# Patient Record
Sex: MALE | Race: WHITE | Employment: UNEMPLOYED | URBAN - METROPOLITAN AREA
[De-identification: names, ages, dates, MRNs, and addresses within clinical notes are randomized per-mention and may not be internally consistent; named-entity substitution may affect disease eponyms.]

---

## 2017-07-01 ENCOUNTER — APPOINTMENT (OUTPATIENT)
Dept: GENERAL RADIOLOGY | Age: 62
End: 2017-07-01
Attending: EMERGENCY MEDICINE
Payer: MEDICARE

## 2017-07-01 ENCOUNTER — HOSPITAL ENCOUNTER (EMERGENCY)
Age: 62
Discharge: HOME | End: 2017-07-01
Attending: EMERGENCY MEDICINE
Payer: MEDICARE

## 2017-07-01 VITALS
WEIGHT: 191 LBS | RESPIRATION RATE: 26 BRPM | SYSTOLIC BLOOD PRESSURE: 128 MMHG | HEIGHT: 73 IN | TEMPERATURE: 98.2 F | DIASTOLIC BLOOD PRESSURE: 81 MMHG | HEART RATE: 65 BPM | OXYGEN SATURATION: 98 % | BODY MASS INDEX: 25.31 KG/M2

## 2017-07-01 DIAGNOSIS — R06.02 SOB (SHORTNESS OF BREATH): Primary | ICD-10-CM

## 2017-07-01 LAB
ALBUMIN SERPL BCP-MCNC: 3.1 G/DL (ref 3.4–5)
ALBUMIN/GLOB SERPL: 0.9 {RATIO} (ref 0.8–1.7)
ALP SERPL-CCNC: 95 U/L (ref 45–117)
ALT SERPL-CCNC: 14 U/L (ref 16–61)
ANION GAP BLD CALC-SCNC: 8 MMOL/L (ref 3–18)
AST SERPL W P-5'-P-CCNC: 12 U/L (ref 15–37)
ATRIAL RATE: 85 BPM
BASOPHILS # BLD AUTO: 0 K/UL (ref 0–0.1)
BASOPHILS # BLD: 0 % (ref 0–2)
BILIRUB SERPL-MCNC: 0.2 MG/DL (ref 0.2–1)
BNP SERPL-MCNC: 211 PG/ML (ref 0–900)
BUN SERPL-MCNC: 8 MG/DL (ref 7–18)
BUN/CREAT SERPL: 8 (ref 12–20)
CALCIUM SERPL-MCNC: 8.8 MG/DL (ref 8.5–10.1)
CALCULATED P AXIS, ECG09: 34 DEGREES
CALCULATED R AXIS, ECG10: 26 DEGREES
CALCULATED T AXIS, ECG11: 61 DEGREES
CHLORIDE SERPL-SCNC: 108 MMOL/L (ref 100–108)
CK MB CFR SERPL CALC: 0.9 % (ref 0–4)
CK MB CFR SERPL CALC: NORMAL % (ref 0–4)
CK MB SERPL-MCNC: 1.1 NG/ML (ref 5–25)
CK MB SERPL-MCNC: <1 NG/ML (ref 5–25)
CK SERPL-CCNC: 107 U/L (ref 39–308)
CK SERPL-CCNC: 124 U/L (ref 39–308)
CO2 SERPL-SCNC: 25 MMOL/L (ref 21–32)
CREAT SERPL-MCNC: 0.96 MG/DL (ref 0.6–1.3)
DIAGNOSIS, 93000: NORMAL
DIFFERENTIAL METHOD BLD: ABNORMAL
EOSINOPHIL # BLD: 0.2 K/UL (ref 0–0.4)
EOSINOPHIL NFR BLD: 2 % (ref 0–5)
ERYTHROCYTE [DISTWIDTH] IN BLOOD BY AUTOMATED COUNT: 14.6 % (ref 11.6–14.5)
GLOBULIN SER CALC-MCNC: 3.5 G/DL (ref 2–4)
GLUCOSE SERPL-MCNC: 129 MG/DL (ref 74–99)
HCT VFR BLD AUTO: 41.2 % (ref 36–48)
HGB BLD-MCNC: 13.8 G/DL (ref 13–16)
LYMPHOCYTES # BLD AUTO: 33 % (ref 21–52)
LYMPHOCYTES # BLD: 2.2 K/UL (ref 0.9–3.6)
MAGNESIUM SERPL-MCNC: 2.2 MG/DL (ref 1.6–2.6)
MCH RBC QN AUTO: 28.3 PG (ref 24–34)
MCHC RBC AUTO-ENTMCNC: 33.5 G/DL (ref 31–37)
MCV RBC AUTO: 84.4 FL (ref 74–97)
MONOCYTES # BLD: 0.4 K/UL (ref 0.05–1.2)
MONOCYTES NFR BLD AUTO: 6 % (ref 3–10)
NEUTS SEG # BLD: 3.9 K/UL (ref 1.8–8)
NEUTS SEG NFR BLD AUTO: 59 % (ref 40–73)
P-R INTERVAL, ECG05: 154 MS
PLATELET # BLD AUTO: 165 K/UL (ref 135–420)
PMV BLD AUTO: 10 FL (ref 9.2–11.8)
POTASSIUM SERPL-SCNC: 3.6 MMOL/L (ref 3.5–5.5)
PROT SERPL-MCNC: 6.6 G/DL (ref 6.4–8.2)
Q-T INTERVAL, ECG07: 372 MS
QRS DURATION, ECG06: 84 MS
QTC CALCULATION (BEZET), ECG08: 442 MS
RBC # BLD AUTO: 4.88 M/UL (ref 4.7–5.5)
SODIUM SERPL-SCNC: 141 MMOL/L (ref 136–145)
TROPONIN I SERPL-MCNC: <0.02 NG/ML (ref 0–0.04)
TROPONIN I SERPL-MCNC: <0.02 NG/ML (ref 0–0.04)
VENTRICULAR RATE, ECG03: 85 BPM
WBC # BLD AUTO: 6.7 K/UL (ref 4.6–13.2)

## 2017-07-01 PROCEDURE — 83735 ASSAY OF MAGNESIUM: CPT | Performed by: EMERGENCY MEDICINE

## 2017-07-01 PROCEDURE — 71010 XR CHEST PORT: CPT

## 2017-07-01 PROCEDURE — 83880 ASSAY OF NATRIURETIC PEPTIDE: CPT | Performed by: EMERGENCY MEDICINE

## 2017-07-01 PROCEDURE — 80053 COMPREHEN METABOLIC PANEL: CPT | Performed by: EMERGENCY MEDICINE

## 2017-07-01 PROCEDURE — 93005 ELECTROCARDIOGRAM TRACING: CPT

## 2017-07-01 PROCEDURE — 82550 ASSAY OF CK (CPK): CPT | Performed by: EMERGENCY MEDICINE

## 2017-07-01 PROCEDURE — 99285 EMERGENCY DEPT VISIT HI MDM: CPT

## 2017-07-01 PROCEDURE — 85025 COMPLETE CBC W/AUTO DIFF WBC: CPT | Performed by: EMERGENCY MEDICINE

## 2017-07-01 PROCEDURE — 70360 X-RAY EXAM OF NECK: CPT

## 2017-07-01 NOTE — DISCHARGE INSTRUCTIONS

## 2017-07-01 NOTE — ED PROVIDER NOTES
HPI Comments: 1:55 PM Dave Link is a 60 y.o. male with a history of HTN,COPD, heart failure who presents to the emergency department via EMS c/o SOB. The patient reports no other symptoms and has no other concerns at this time. PCP: Cara Mccormack MD      The history is provided by the patient. Past Medical History:   Diagnosis Date    Arthritis     Cancer of bone ([address]     COPD     Heart failure (HCC)     stents x2 in 2008    Hypertension        Past Surgical History:   Procedure Laterality Date    CARDIAC SURG PROCEDURE UNLIST      COLONOSCOPY N/A 8/15/2016    COLONOSCOPY with polypectomy, biopsy and clip performed by Viviana Sullivan MD at [address]  6-5-13    HX HEART CATHETERIZATION  6-5-13         Family History:   Problem Relation Age of Onset    Stroke Mother     Heart Disease Father        Social History     Social History    Marital status: SINGLE     Spouse name: N/A    Number of children: N/A    Years of education: N/A     Occupational History    Not on file. Social History Main Topics    Smoking status: Former Smoker     Packs/day: 2.00     Years: 39.00     Quit date: 8/10/2012    Smokeless tobacco: Never Used    Alcohol use No    Drug use: No    Sexual activity: No     Other Topics Concern    Not on file     Social History Narrative         ALLERGIES: Review of patient's allergies indicates no known allergies. Review of Systems   Respiratory: Positive for shortness of breath. There were no vitals filed for this visit. Physical Exam   Constitutional: He is oriented to person, place, and time. He appears well-developed. HENT:   Head: Normocephalic and atraumatic. Eyes: EOM are normal. Pupils are equal, round, and reactive to light. Neck: Normal range of motion. Neck supple. Cardiovascular: Normal rate, regular rhythm and normal heart sounds. Exam reveals no friction rub.     No murmur heard.  Pulmonary/Chest: Effort normal and breath sounds normal. No respiratory distress. He has no wheezes. Abdominal: Soft. He exhibits no distension. There is no tenderness. There is no rebound and no guarding. Musculoskeletal: Normal range of motion. Neurological: He is alert and oriented to person, place, and time. Skin: Skin is warm and dry. Psychiatric: He has a normal mood and affect. His behavior is normal. Thought content normal.        MDM  Number of Diagnoses or Management Options  Diagnosis management comments:  57-year-old male presents for shortness of breath. Initially he said he was just choking however he said he felt like he was choking because he was having trouble getting air. EMS arrived on scene hew was noted to  have diminished lung sounds bilaterally given a nebulizer his symptoms improved significantly. On arrival he had no symptoms. Chest x-ray and soft tissue neck were unremarkable. EKG shows sinus 85 and normal axis normal intervals no ST elevation or depression or hypertrophy. Patient is feeling better likely acute COPD exacerbation. We will double check troponin and if normal send home. Additional history patient woke up felt short of breath and vomited once,  never had chest pain and this resolved shortly after. Troponin is negative ×2 do not think this is angina. We will refer back to primary care doctor. Patient feels better without intervention aside from albuterol by EMS    ED Course       Procedures                       Scribe Attestation:     Alex Santos, scribing for and in the presence of Hesham Wright MD July 01, 2017 at 1:56 PM     Signed by: Yodit Canada, July 01, 2017, 1:56 PM    Physician Attestation:   I personally performed the services described in this documentation, reviewed and edited the documentation which was dictated to the scribe in my presence, and it accurately records my words and actions.  Sherron Low Chriss Stafford MD  July 01, 2017 at 1:56 PM

## 2017-07-18 ENCOUNTER — HOSPITAL ENCOUNTER (OUTPATIENT)
Dept: CT IMAGING | Age: 62
Discharge: HOME | End: 2017-07-18
Attending: INTERNAL MEDICINE
Payer: MEDICARE

## 2017-07-18 DIAGNOSIS — C83.38 DIFFUSE LARGE B-CELL LYMPHOMA OF LYMPH NODES OF MULTIPLE SITES (HCC): ICD-10-CM

## 2017-07-18 PROCEDURE — 74011636320 HC RX REV CODE- 636/320: Performed by: INTERNAL MEDICINE

## 2017-07-18 PROCEDURE — 74177 CT ABD & PELVIS W/CONTRAST: CPT

## 2017-07-18 RX ADMIN — IOPAMIDOL 100 ML: 612 INJECTION, SOLUTION INTRAVENOUS at 08:00

## 2017-08-29 ENCOUNTER — HOSPITAL ENCOUNTER (EMERGENCY)
Age: 62
Discharge: HOME | End: 2017-08-29
Attending: EMERGENCY MEDICINE
Payer: MEDICARE

## 2017-08-29 ENCOUNTER — APPOINTMENT (OUTPATIENT)
Dept: CT IMAGING | Age: 62
End: 2017-08-29
Attending: EMERGENCY MEDICINE
Payer: MEDICARE

## 2017-08-29 VITALS
SYSTOLIC BLOOD PRESSURE: 131 MMHG | TEMPERATURE: 97.8 F | HEIGHT: 72 IN | OXYGEN SATURATION: 92 % | DIASTOLIC BLOOD PRESSURE: 76 MMHG | WEIGHT: 192 LBS | HEART RATE: 79 BPM | RESPIRATION RATE: 20 BRPM | BODY MASS INDEX: 26.01 KG/M2

## 2017-08-29 DIAGNOSIS — M54.40 LOW BACK PAIN WITH SCIATICA, SCIATICA LATERALITY UNSPECIFIED, UNSPECIFIED BACK PAIN LATERALITY, UNSPECIFIED CHRONICITY: Primary | ICD-10-CM

## 2017-08-29 LAB
ALBUMIN SERPL-MCNC: 3.3 G/DL (ref 3.4–5)
ALBUMIN/GLOB SERPL: 1.1 {RATIO} (ref 0.8–1.7)
ALP SERPL-CCNC: 102 U/L (ref 45–117)
ALT SERPL-CCNC: 15 U/L (ref 16–61)
ANION GAP SERPL CALC-SCNC: 7 MMOL/L (ref 3–18)
APPEARANCE UR: CLEAR
AST SERPL-CCNC: 17 U/L (ref 15–37)
BASOPHILS # BLD: 0.1 K/UL (ref 0–0.06)
BASOPHILS NFR BLD: 1 % (ref 0–2)
BILIRUB SERPL-MCNC: 0.3 MG/DL (ref 0.2–1)
BILIRUB UR QL: NEGATIVE
BUN SERPL-MCNC: 9 MG/DL (ref 7–18)
BUN/CREAT SERPL: 9 (ref 12–20)
CALCIUM SERPL-MCNC: 9 MG/DL (ref 8.5–10.1)
CHLORIDE SERPL-SCNC: 106 MMOL/L (ref 100–108)
CO2 SERPL-SCNC: 28 MMOL/L (ref 21–32)
COLOR UR: YELLOW
CREAT SERPL-MCNC: 0.96 MG/DL (ref 0.6–1.3)
DIFFERENTIAL METHOD BLD: ABNORMAL
EOSINOPHIL # BLD: 0.2 K/UL (ref 0–0.4)
EOSINOPHIL NFR BLD: 4 % (ref 0–5)
EPITH CASTS URNS QL MICRO: ABNORMAL /LPF (ref 0–5)
ERYTHROCYTE [DISTWIDTH] IN BLOOD BY AUTOMATED COUNT: 14.4 % (ref 11.6–14.5)
GLOBULIN SER CALC-MCNC: 3 G/DL (ref 2–4)
GLUCOSE SERPL-MCNC: 92 MG/DL (ref 74–99)
GLUCOSE UR STRIP.AUTO-MCNC: NEGATIVE MG/DL
HCT VFR BLD AUTO: 41.4 % (ref 36–48)
HGB BLD-MCNC: 13.1 G/DL (ref 13–16)
HGB UR QL STRIP: NEGATIVE
HYALINE CASTS URNS QL MICRO: ABNORMAL /LPF (ref 0–2)
KETONES UR QL STRIP.AUTO: NEGATIVE MG/DL
LEUKOCYTE ESTERASE UR QL STRIP.AUTO: ABNORMAL
LYMPHOCYTES # BLD: 1.2 K/UL (ref 0.9–3.6)
LYMPHOCYTES NFR BLD: 24 % (ref 21–52)
MCH RBC QN AUTO: 27.1 PG (ref 24–34)
MCHC RBC AUTO-ENTMCNC: 31.6 G/DL (ref 31–37)
MCV RBC AUTO: 85.7 FL (ref 74–97)
MONOCYTES # BLD: 0.4 K/UL (ref 0.05–1.2)
MONOCYTES NFR BLD: 8 % (ref 3–10)
MUCOUS THREADS URNS QL MICRO: ABNORMAL /LPF
NEUTS SEG # BLD: 3.2 K/UL (ref 1.8–8)
NEUTS SEG NFR BLD: 63 % (ref 40–73)
NITRITE UR QL STRIP.AUTO: NEGATIVE
PH UR STRIP: 6 [PH] (ref 5–8)
PLATELET # BLD AUTO: 185 K/UL (ref 135–420)
PMV BLD AUTO: 10.1 FL (ref 9.2–11.8)
POTASSIUM SERPL-SCNC: 4.6 MMOL/L (ref 3.5–5.5)
PROT SERPL-MCNC: 6.3 G/DL (ref 6.4–8.2)
PROT UR STRIP-MCNC: NEGATIVE MG/DL
RBC # BLD AUTO: 4.83 M/UL (ref 4.7–5.5)
RBC #/AREA URNS HPF: ABNORMAL /HPF (ref 0–5)
SODIUM SERPL-SCNC: 141 MMOL/L (ref 136–145)
SP GR UR REFRACTOMETRY: >1.03 (ref 1–1.03)
UROBILINOGEN UR QL STRIP.AUTO: 1 EU/DL (ref 0.2–1)
WBC # BLD AUTO: 4.9 K/UL (ref 4.6–13.2)
WBC URNS QL MICRO: ABNORMAL /HPF (ref 0–4)

## 2017-08-29 PROCEDURE — 74011250636 HC RX REV CODE- 250/636: Performed by: EMERGENCY MEDICINE

## 2017-08-29 PROCEDURE — 81001 URINALYSIS AUTO W/SCOPE: CPT | Performed by: EMERGENCY MEDICINE

## 2017-08-29 PROCEDURE — 80053 COMPREHEN METABOLIC PANEL: CPT | Performed by: EMERGENCY MEDICINE

## 2017-08-29 PROCEDURE — 74177 CT ABD & PELVIS W/CONTRAST: CPT

## 2017-08-29 PROCEDURE — 96375 TX/PRO/DX INJ NEW DRUG ADDON: CPT

## 2017-08-29 PROCEDURE — 85025 COMPLETE CBC W/AUTO DIFF WBC: CPT | Performed by: EMERGENCY MEDICINE

## 2017-08-29 PROCEDURE — 96374 THER/PROPH/DIAG INJ IV PUSH: CPT

## 2017-08-29 PROCEDURE — 99283 EMERGENCY DEPT VISIT LOW MDM: CPT

## 2017-08-29 PROCEDURE — 74011636320 HC RX REV CODE- 636/320: Performed by: EMERGENCY MEDICINE

## 2017-08-29 RX ORDER — IBUPROFEN 600 MG/1
600 TABLET ORAL
Qty: 30 TAB | Refills: 0 | Status: ON HOLD | OUTPATIENT
Start: 2017-08-29 | End: 2018-06-28

## 2017-08-29 RX ORDER — OXYCODONE AND ACETAMINOPHEN 5; 325 MG/1; MG/1
TABLET ORAL
Qty: 20 TAB | Refills: 0 | Status: SHIPPED | OUTPATIENT
Start: 2017-08-29 | End: 2017-12-10

## 2017-08-29 RX ORDER — HYDROMORPHONE HYDROCHLORIDE 1 MG/ML
1 INJECTION, SOLUTION INTRAMUSCULAR; INTRAVENOUS; SUBCUTANEOUS ONCE
Status: COMPLETED | OUTPATIENT
Start: 2017-08-29 | End: 2017-08-29

## 2017-08-29 RX ORDER — ONDANSETRON 2 MG/ML
4 INJECTION INTRAMUSCULAR; INTRAVENOUS
Status: COMPLETED | OUTPATIENT
Start: 2017-08-29 | End: 2017-08-29

## 2017-08-29 RX ADMIN — ONDANSETRON 4 MG: 2 INJECTION INTRAMUSCULAR; INTRAVENOUS at 09:34

## 2017-08-29 RX ADMIN — IOPAMIDOL 100 ML: 612 INJECTION, SOLUTION INTRAVENOUS at 09:09

## 2017-08-29 RX ADMIN — HYDROMORPHONE HYDROCHLORIDE 1 MG: 1 INJECTION, SOLUTION INTRAMUSCULAR; INTRAVENOUS; SUBCUTANEOUS at 09:35

## 2017-08-29 NOTE — DISCHARGE INSTRUCTIONS
Back Pain: Care Instructions  Your Care Instructions    Back pain has many possible causes. It is often related to problems with muscles and ligaments of the back. It may also be related to problems with the nerves, discs, or bones of the back. Moving, lifting, standing, sitting, or sleeping in an awkward way can strain the back. Sometimes you don't notice the injury until later. Arthritis is another common cause of back pain. Although it may hurt a lot, back pain usually improves on its own within several weeks. Most people recover in 12 weeks or less. Using good home treatment and being careful not to stress your back can help you feel better sooner. Follow-up care is a key part of your treatment and safety. Be sure to make and go to all appointments, and call your doctor if you are having problems. Its also a good idea to know your test results and keep a list of the medicines you take. How can you care for yourself at home? · Sit or lie in positions that are most comfortable and reduce your pain. Try one of these positions when you lie down:  ¨ Lie on your back with your knees bent and supported by large pillows. ¨ Lie on the floor with your legs on the seat of a sofa or chair. [place] on your side with your knees and hips bent and a pillow between your legs. ¨ Lie on your stomach if it does not make pain worse. · Do not sit up in bed, and avoid soft couches and twisted positions. Bed rest can help relieve pain at first, but it delays healing. Avoid bed rest after the first day of back pain. · Change positions every 30 minutes. If you must sit for long periods of time, take breaks from sitting. Get up and walk around, or lie in a comfortable position. · Try using a heating pad on a low or medium setting for 15 to 20 minutes every 2 or 3 hours. Try a warm shower in place of one session with the heating pad. · You can also try an ice pack for 10 to 15 minutes every 2 to 3 hours.  Put a thin cloth between the ice pack and your skin. · Take pain medicines exactly as directed. ¨ If the doctor gave you a prescription medicine for pain, take it as prescribed. ¨ If you are not taking a prescription pain medicine, ask your doctor if you can take an over-the-counter medicine. · Take short walks several times a day. You can start with 5 to 10 minutes, 3 or 4 times a day, and work up to longer walks. Walk on level surfaces and avoid hills and stairs until your back is better. · Return to work and other activities as soon as you can. Continued rest without activity is usually not good for your back. · To prevent future back pain, do exercises to stretch and strengthen your back and stomach. Learn how to use good posture, safe lifting techniques, and proper body mechanics. When should you call for help? Call your doctor now or seek immediate medical care if:  · You have new or worsening numbness in your legs. · You have new or worsening weakness in your legs. (This could make it hard to stand up.)  · You lose control of your bladder or bowels. Watch closely for changes in your health, and be sure to contact your doctor if:  · Your pain gets worse. · You are not getting better after 2 weeks. Where can you learn more? Go to http://messi-thang.info/. Enter S192 in the search box to learn more about \"Back Pain: Care Instructions. \"  Current as of: March 21, 2017  Content Version: 11.3  © 8190-8087 [employer]. Care instructions adapted under license by [employer] (which disclaims liability or warranty for this information). If you have questions about a medical condition or this instruction, always ask your healthcare professional. [address] any warranty or liability for your use of this information. Learning About Relief for Back Pain  What is back tension and strain?     Back strain happens when you overstretch, or pull, a muscle in your back. You may hurt your back in an accident or when you exercise or lift something. Most back pain will get better with rest and time. You can take care of yourself at home to help your back heal.  What can you do first to relieve back pain? When you first feel back pain, try these steps:  · Walk. Take a short walk (10 to 20 minutes) on a level surface (no slopes, hills, or stairs) every 2 to 3 hours. Walk only distances you can manage without pain, especially leg pain. · Relax. Find a comfortable position for rest. Some people are comfortable on the floor or a medium-firm bed with a small pillow under their head and another under their knees. Some people prefer to lie on their side with a pillow between their knees. Don't stay in one position for too long. · Try heat or ice. Try using a heating pad on a low or medium setting, or take a warm shower, for 15 to 20 minutes every 2 to 3 hours. Or you can buy single-use heat wraps that last up to 8 hours. You can also try an ice pack for 10 to 15 minutes every 2 to 3 hours. You can use an ice pack or a bag of frozen vegetables wrapped in a thin towel. There is not strong evidence that either heat or ice will help, but you can try them to see if they help. You may also want to try switching between heat and cold. · Take pain medicine exactly as directed. ¨ If the doctor gave you a prescription medicine for pain, take it as prescribed. ¨ If you are not taking a prescription pain medicine, ask your doctor if you can take an over-the-counter medicine. What else can you do? · Stretch and exercise. Exercises that increase flexibility may relieve your pain and make it easier for your muscles to keep your spine in a good, neutral position. And don't forget to keep walking. · Do self-massage. You can use self-massage to unwind after work or school or to energize yourself in the morning. You can easily massage your feet, hands, or neck.  Self-massage works best if you are in comfortable clothes and are sitting or lying in a comfortable position. Use oil or lotion to massage bare skin. · Reduce stress. Back pain can lead to a vicious [place]ne[place]: Distress about the pain tenses the muscles in your back, which in turn causes more pain. Learn how to relax your mind and your muscles to lower your stress. Where can you learn more? Go to http://messi-thang.info/. Enter P242 in the search box to learn more about \"Learning About Relief for Back Pain. \"  Current as of: March 21, 2017  Content Version: 11.3  © 5923-5058 [employer]. Care instructions adapted under license by [employer] (which disclaims liability or warranty for this information). If you have questions about a medical condition or this instruction, always ask your healthcare professional. [address] any warranty or liability for your use of this information.

## 2017-08-29 NOTE — ED TRIAGE NOTES
C/o right lower back pain x 2 days. Denies any injury or heavy lifting. Denies urinary sx's. States he has Non-Hodgkin's lymphoma, in remission. Denies fevers. States he has taken aspirin & used a heating pad without any relief from pain.

## 2017-08-29 NOTE — ED NOTES
Hourly rounding:  Patient returned from CT. States he is still unable to provide urine specimen. Pain level now 5/10. Call bell within reach.

## 2017-08-29 NOTE — ED PROVIDER NOTES
HPI Comments: 8:58 AM Dave Muniz is a 60 y.o. male with a history of COPD and lymphoma presents to ED c/o lower back pain onset today. Pain level is 8/10 and pt states it started unexpectedly. Pt had previous back pain in 2013 when he was diagnosed with lymphoma. The lymphoma is in remission. Denies any other symptoms or complaints at the moment. Followed by: Dr. Glen Barton      The history is provided by the patient. Past Medical History:   Diagnosis Date    Arthritis     Cancer of bone ([address]     COPD     Heart failure (HCC)     stents x2 in 2008    Hypertension        Past Surgical History:   Procedure Laterality Date    CARDIAC SURG PROCEDURE UNLIST      COLONOSCOPY N/A 8/15/2016    COLONOSCOPY with polypectomy, biopsy and clip performed by Krishna Bryan MD at [address]  6-5-13     HEART CATHETERIZATION  6-5-13         Family History:   Problem Relation Age of Onset    Stroke Mother     Heart Disease Father        Social History     Social History    Marital status: SINGLE     Spouse name: N/A    Number of children: N/A    Years of education: N/A     Occupational History    Not on file. Social History Main Topics    Smoking status: Former Smoker     Packs/day: 2.00     Years: 39.00     Quit date: 8/10/2012    Smokeless tobacco: Never Used    Alcohol use No    Drug use: No    Sexual activity: No     Other Topics Concern    Not on file     Social History Narrative         ALLERGIES: Review of patient's allergies indicates no known allergies. Review of Systems   Constitutional: Negative. HENT: Negative. Eyes: Negative. Respiratory: Negative. Cardiovascular: Negative. Gastrointestinal: Negative. Endocrine: Negative. Genitourinary: Negative. Musculoskeletal: Positive for back pain. Skin: Negative. Allergic/Immunologic: Negative. Neurological: Negative. Hematological: Negative. Psychiatric/Behavioral: Negative. All other systems reviewed and are negative. Vitals:    08/29/17 0844 08/29/17 1045   BP: 140/87 131/76   Pulse: 79    Resp: 20    Temp: 97.8 °F (36.6 °C)    SpO2: 95% 92%   Weight: 87.1 kg (192 lb)    Height: 6' (1.829 m)             Physical Exam   Constitutional: He is oriented to person, place, and time. He appears well-developed and well-nourished. No distress. HENT:   Head: Normocephalic. Mouth/Throat: Oropharynx is clear and moist.   Eyes: Conjunctivae and EOM are normal. Pupils are equal, round, and reactive to light. Neck: Normal range of motion. Neck supple. Cardiovascular: Normal rate, regular rhythm, normal heart sounds and intact distal pulses. No murmur heard. Pulmonary/Chest: Effort normal and breath sounds normal. No respiratory distress. He has no wheezes. He has no rales. He exhibits no tenderness. Abdominal: Soft. Bowel sounds are normal. He exhibits no distension. There is no tenderness. There is no rebound. Musculoskeletal: Normal range of motion. He exhibits no edema or tenderness. Neurological: He is alert and oriented to person, place, and time. No cranial nerve deficit. He exhibits normal muscle tone. Coordination normal.   Skin: Skin is warm and dry. No rash noted. Psychiatric: He has a normal mood and affect. His behavior is normal. Judgment and thought content normal.   Nursing note and vitals reviewed.        MDM  Number of Diagnoses or Management Options  Low back pain with sciatica, sciatica laterality unspecified, unspecified back pain laterality, unspecified chronicity: new and requires workup     Amount and/or Complexity of Data Reviewed  Clinical lab tests: ordered and reviewed  Tests in the radiology section of CPT®: ordered and reviewed    Risk of Complications, Morbidity, and/or Mortality  Presenting problems: moderate  Diagnostic procedures: high  Management options: moderate    Patient Progress  Patient progress: stable    ED Course       Procedures      Vitals:  Patient Vitals for the past 12 hrs:   Temp Pulse Resp BP SpO2   08/29/17 1045 - - - 131/76 92 %   08/29/17 0844 97.8 °F (36.6 °C) 79 20 140/87 95 %   Patient is 95% O2 on RA, indicating adequate oxygenation. Medications ordered:   Medications   HYDROmorphone (PF) (DILAUDID) injection 1 mg (1 mg IntraVENous Given 8/29/17 0935)   ondansetron (ZOFRAN) injection 4 mg (4 mg IntraVENous Given 8/29/17 0934)   iopamidol (ISOVUE 300) 61 % contrast injection 100 mL (100 mL IntraVENous Given 8/29/17 0909)         Lab findings:  Recent Results (from the past 12 hour(s))   CBC WITH AUTOMATED DIFF    Collection Time: 08/29/17  9:35 AM   Result Value Ref Range    WBC 4.9 4.6 - 13.2 K/uL    RBC 4.83 4.70 - 5.50 M/uL    HGB 13.1 13.0 - 16.0 g/dL    HCT 41.4 36.0 - 48.0 %    MCV 85.7 74.0 - 97.0 FL    MCH 27.1 24.0 - 34.0 PG    MCHC 31.6 31.0 - 37.0 g/dL    RDW 14.4 11.6 - 14.5 %    PLATELET 785 475 - 742 K/uL    MPV 10.1 9.2 - 11.8 FL    NEUTROPHILS 63 40 - 73 %    LYMPHOCYTES 24 21 - 52 %    MONOCYTES 8 3 - 10 %    EOSINOPHILS 4 0 - 5 %    BASOPHILS 1 0 - 2 %    ABS. NEUTROPHILS 3.2 1.8 - 8.0 K/UL    ABS. LYMPHOCYTES 1.2 0.9 - 3.6 K/UL    ABS. MONOCYTES 0.4 0.05 - 1.2 K/UL    ABS. EOSINOPHILS 0.2 0.0 - 0.4 K/UL    ABS. BASOPHILS 0.1 (H) 0.0 - 0.06 K/UL    DF AUTOMATED     METABOLIC PANEL, COMPREHENSIVE    Collection Time: 08/29/17  9:35 AM   Result Value Ref Range    Sodium 141 136 - 145 mmol/L    Potassium 4.6 3.5 - 5.5 mmol/L    Chloride 106 100 - 108 mmol/L    CO2 28 21 - 32 mmol/L    Anion gap 7 3.0 - 18 mmol/L    Glucose 92 74 - 99 mg/dL    BUN 9 7.0 - 18 MG/DL    Creatinine 0.96 0.6 - 1.3 MG/DL    BUN/Creatinine ratio 9 (L) 12 - 20      GFR est AA >60 >60 ml/min/1.73m2    GFR est non-AA >60 >60 ml/min/1.73m2    Calcium 9.0 8.5 - 10.1 MG/DL    Bilirubin, total 0.3 0.2 - 1.0 MG/DL    ALT (SGPT) 15 (L) 16 - 61 U/L    AST (SGOT) 17 15 - 37 U/L    Alk.  phosphatase 102 45 - 117 U/L    Protein, total 6.3 (L) 6.4 - 8.2 g/dL    Albumin 3.3 (L) 3.4 - 5.0 g/dL    Globulin 3.0 2.0 - 4.0 g/dL    A-G Ratio 1.1 0.8 - 1.7     URINALYSIS W/ RFLX MICROSCOPIC    Collection Time: 08/29/17 10:58 AM   Result Value Ref Range    Color YELLOW      Appearance CLEAR      Specific gravity >1.030 (H) 1.005 - 1.030    pH (UA) 6.0 5.0 - 8.0      Protein NEGATIVE  NEG mg/dL    Glucose NEGATIVE  NEG mg/dL    Ketone NEGATIVE  NEG mg/dL    Bilirubin NEGATIVE  NEG      Blood NEGATIVE  NEG      Urobilinogen 1.0 0.2 - 1.0 EU/dL    Nitrites NEGATIVE  NEG      Leukocyte Esterase TRACE (A) NEG     URINE MICROSCOPIC ONLY    Collection Time: 08/29/17 10:58 AM   Result Value Ref Range    WBC 4 to 10 0 - 4 /hpf    RBC NONE 0 - 5 /hpf    Epithelial cells FEW 0 - 5 /lpf    Mucus 1+ (A) NEG /lpf    Hyaline cast 0 to 3 0 - 2 /lpf         X-Ray, CT or other radiology findings or impressions:  Ct Abd Pelv W Cont  IMPRESSION: 1. No significant interval change. No acute process in the abdomen or pelvis. 2. Stable osseous lesions. Degenerative changes in the spine without definite acute osseous abnormality. 3. Sigmoid diverticulosis without evidence of acute diverticulitis. 4. Fat-containing inguinal hernias. 5. Prostate hypertrophy. 6. Aortic atherosclerosis with stable infrarenal aortic ectasia. Diagnosis:   1. Low back pain with sciatica, sciatica laterality unspecified, unspecified back pain laterality, unspecified chronicity        Disposition: discharged. Follow-up Information     None           Patient's Medications   Start Taking    IBUPROFEN (MOTRIN) 600 MG TABLET    Take 1 Tab by mouth every six (6) hours as needed for Pain. OXYCODONE-ACETAMINOPHEN (PERCOCET) 5-325 MG PER TABLET    Take 1 to 2 tablet every 6 hours as needed for pain control. If you were instructed to try over the counter ibuprofen or tylenol, only take the percocet for pain not controlled with the over the counter medication.    Continue Taking ASPIRIN (ASPIRIN) 325 MG TABLET    Take 325 mg by mouth daily. CHOLECALCIFEROL, VITAMIN D3, (VITAMIN D3) 5,000 UNIT TAB TABLET    Take 5,000 Units by mouth daily. ESOMEPRAZOLE (NEXIUM) 20 MG CAPSULE    Take 20 mg by mouth daily. These Medications have changed    No medications on file   Stop Taking    ALBUTEROL (PROVENTIL HFA, VENTOLIN HFA) 90 MCG/ACTUATION INHALER    Take 2 Puffs by inhalation every four (4) hours as needed for Wheezing. TRAMADOL (ULTRAM) 50 MG TABLET    Take 1 Tab by mouth every six (6) hours as needed for Pain. Max Daily Amount: 200 mg.         Scribe Attestation      Keiko Eugene (Aj) acting as a scribe for and in the presence of Kenrick Sauer MD      August 29, 2017 at 12:02 PM       Provider Attestation:      I personally performed the services described in the documentation, reviewed the documentation, as recorded by the scribe in my presence, and it accurately and completely records my words and actions.  August 29, 2017 at 12:02 PM - Kenrick Sauer MD

## 2017-10-04 ENCOUNTER — OFFICE VISIT (OUTPATIENT)
Dept: SURGERY | Age: 62
End: 2017-10-04

## 2017-10-04 VITALS — RESPIRATION RATE: 16 BRPM | DIASTOLIC BLOOD PRESSURE: 80 MMHG | SYSTOLIC BLOOD PRESSURE: 130 MMHG

## 2017-10-04 DIAGNOSIS — K40.90 RIGHT INGUINAL HERNIA: Primary | ICD-10-CM

## 2017-10-27 ENCOUNTER — HOSPITAL ENCOUNTER (OUTPATIENT)
Age: 62
Discharge: HOME | End: 2017-10-27
Attending: INTERNAL MEDICINE
Payer: MEDICARE

## 2017-10-27 DIAGNOSIS — C83.38 DIFFUSE LARGE B-CELL LYMPHOMA OF LYMPH NODES OF MULTIPLE SITES (HCC): ICD-10-CM

## 2017-10-27 LAB — CREAT UR-MCNC: 0.9 MG/DL (ref 0.6–1.3)

## 2017-10-27 PROCEDURE — A9585 GADOBUTROL INJECTION: HCPCS | Performed by: INTERNAL MEDICINE

## 2017-10-27 PROCEDURE — 82565 ASSAY OF CREATININE: CPT

## 2017-10-27 PROCEDURE — 72157 MRI CHEST SPINE W/O & W/DYE: CPT

## 2017-10-27 PROCEDURE — 74011250636 HC RX REV CODE- 250/636: Performed by: INTERNAL MEDICINE

## 2017-10-27 PROCEDURE — 72158 MRI LUMBAR SPINE W/O & W/DYE: CPT

## 2017-10-27 RX ADMIN — GADOBUTROL 9 ML: 604.72 INJECTION INTRAVENOUS at 14:00

## 2017-11-06 ENCOUNTER — OFFICE VISIT (OUTPATIENT)
Dept: SURGERY | Age: 62
End: 2017-11-06

## 2017-11-06 VITALS — SYSTOLIC BLOOD PRESSURE: 128 MMHG | DIASTOLIC BLOOD PRESSURE: 80 MMHG | RESPIRATION RATE: 18 BRPM

## 2017-11-06 DIAGNOSIS — K40.90 RIGHT INGUINAL HERNIA: Primary | ICD-10-CM

## 2017-11-06 RX ORDER — ATORVASTATIN CALCIUM 20 MG/1
TABLET, FILM COATED ORAL DAILY
COMMUNITY

## 2017-11-06 NOTE — PROGRESS NOTES
Progress Note    Patient: Dave Al Jr. MRN: [id number]  SSN: [id number]   YOB: 1955  Age: 60 y.o. Sex: male     Chief Complaint   Patient presents with    Follow-up       HPI    Mr. Génesis Santo returns after being cleared for his hernia repair by his PCP as well as cardiology. Will make plans for his surgery. I discussed again the the procedure and the technical details he understands that and is anxious to proceed. Past Medical History:   Diagnosis Date    Arthritis     Cancer of bone ([address]     COPD     Heart failure (HCC)     stents x2 in 2008    Hypertension      Past Surgical History:   Procedure Laterality Date    CARDIAC SURG PROCEDURE UNLIST      COLONOSCOPY N/A 8/15/2016    COLONOSCOPY with polypectomy, biopsy and clip performed by Taiwo Tolbert MD at [facility]    HX CORONARY STENT PLACEMENT  6-5-13    HX HEART CATHETERIZATION  6-5-13     No Known Allergies  Current Outpatient Prescriptions   Medication Sig Dispense Refill    atorvastatin (LIPITOR) 20 mg tablet Take  by mouth daily.  cholecalciferol, VITAMIN D3, (VITAMIN D3) 5,000 unit tab tablet Take 5,000 Units by mouth daily.  aspirin (ASPIRIN) 325 mg tablet Take 325 mg by mouth daily.  esomeprazole (NEXIUM) 20 mg capsule Take 20 mg by mouth daily.  oxyCODONE-acetaminophen (PERCOCET) 5-325 mg per tablet Take 1 to 2 tablet every 6 hours as needed for pain control. If you were instructed to try over the counter ibuprofen or tylenol, only take the percocet for pain not controlled with the over the counter medication. 20 Tab 0    ibuprofen (MOTRIN) 600 mg tablet Take 1 Tab by mouth every six (6) hours as needed for Pain. 30 Tab 0     Social History     Social History    Marital status: SINGLE     Spouse name: N/A    Number of children: N/A    Years of education: N/A     Occupational History    Not on file.      Social History Main Topics    Smoking status: Former Smoker     Packs/day: 2.00 Years: 39.00     Quit date: 8/10/2012    Smokeless tobacco: Never Used    Alcohol use No    Drug use: No    Sexual activity: No     Other Topics Concern    Not on file     Social History Narrative     Family History   Problem Relation Age of Onset    Stroke Mother     Heart Disease Father          Review of systems:  Patient denies any reflux, emesis, abdominal pain, change in bowel habits, hematochezia, melena, fever, weight loss, fatigue chills, dermatitis, abnormal moles, change in vision, vertigo, epistaxis, dysphagia, hoarseness, chest pain, palpitations, hypertension, edema, cough, shortness of breath, wheezing, hemoptysis, snoring, hematuria, diabetes, thyroid disease, anemia, bruising, history of blood transfusion, dizziness, headache, or fainting. Physical Examination    Well developed well nourished male in no apparent distress  Visit Vitals    /80    Resp 18      Head: normocephalic, atraumatic  Mouth: Clear, no overt lesions, oral mucosa pink and moist  Neck: supple, no masses, no adenopathy or carotid bruits, trachea midline  Resp: clear to auscultation bilaterally, no wheeze, rhonchi or rales, excursions normal and symmetrical  Cardio: Regular rate and rhythm, no murmurs, clicks, gallops or rubs, no edema or varicosities  Abdomen: soft, nontender, nondistended, normoactive bowel sounds, large right inguinal hernia, no hepatosplenomegaly,   Back: Deferred  Extremeties: warm, well-perfused, no tenderness or swelling, normal gait/station  Neuro: sensation and strength grossly intact and symmetrical  Psych: alert and oriented to person, place and time  Breast exam deferred    IMPRESSION  Large right inguinal hernia now with medical and cardiac clearance    PLAN  Orders Placed This Encounter    atorvastatin (LIPITOR) 20 mg tablet     Sig: Take  by mouth daily.      Plan procedure  Milena Ware MD

## 2017-11-21 ENCOUNTER — HOSPITAL ENCOUNTER (OUTPATIENT)
Dept: PREADMISSION TESTING | Age: 62
Discharge: HOME | End: 2017-11-21
Payer: MEDICARE

## 2017-11-21 DIAGNOSIS — K40.90 RIGHT INGUINAL HERNIA: ICD-10-CM

## 2017-11-21 LAB
ANION GAP SERPL CALC-SCNC: 9 MMOL/L (ref 3–18)
ATRIAL RATE: 60 BPM
BASOPHILS # BLD: 0.1 K/UL (ref 0–0.06)
BASOPHILS NFR BLD: 1 % (ref 0–2)
BUN SERPL-MCNC: 9 MG/DL (ref 7–18)
BUN/CREAT SERPL: 10 (ref 12–20)
CALCIUM SERPL-MCNC: 8.7 MG/DL (ref 8.5–10.1)
CALCULATED P AXIS, ECG09: 52 DEGREES
CALCULATED R AXIS, ECG10: 61 DEGREES
CALCULATED T AXIS, ECG11: 69 DEGREES
CHLORIDE SERPL-SCNC: 106 MMOL/L (ref 100–108)
CO2 SERPL-SCNC: 24 MMOL/L (ref 21–32)
CREAT SERPL-MCNC: 0.9 MG/DL (ref 0.6–1.3)
DIAGNOSIS, 93000: NORMAL
DIFFERENTIAL METHOD BLD: ABNORMAL
EOSINOPHIL # BLD: 0.2 K/UL (ref 0–0.4)
EOSINOPHIL NFR BLD: 4 % (ref 0–5)
ERYTHROCYTE [DISTWIDTH] IN BLOOD BY AUTOMATED COUNT: 14.3 % (ref 11.6–14.5)
GLUCOSE SERPL-MCNC: 88 MG/DL (ref 74–99)
HCT VFR BLD AUTO: 43.2 % (ref 36–48)
HGB BLD-MCNC: 13.9 G/DL (ref 13–16)
LYMPHOCYTES # BLD: 1.7 K/UL (ref 0.9–3.6)
LYMPHOCYTES NFR BLD: 25 % (ref 21–52)
MCH RBC QN AUTO: 27.4 PG (ref 24–34)
MCHC RBC AUTO-ENTMCNC: 32.2 G/DL (ref 31–37)
MCV RBC AUTO: 85 FL (ref 74–97)
MONOCYTES # BLD: 0.4 K/UL (ref 0.05–1.2)
MONOCYTES NFR BLD: 6 % (ref 3–10)
NEUTS SEG # BLD: 4.4 K/UL (ref 1.8–8)
NEUTS SEG NFR BLD: 64 % (ref 40–73)
P-R INTERVAL, ECG05: 156 MS
PLATELET # BLD AUTO: 202 K/UL (ref 135–420)
PMV BLD AUTO: 10 FL (ref 9.2–11.8)
POTASSIUM SERPL-SCNC: 4.3 MMOL/L (ref 3.5–5.5)
Q-T INTERVAL, ECG07: 384 MS
QRS DURATION, ECG06: 86 MS
QTC CALCULATION (BEZET), ECG08: 384 MS
RBC # BLD AUTO: 5.08 M/UL (ref 4.7–5.5)
SODIUM SERPL-SCNC: 139 MMOL/L (ref 136–145)
VENTRICULAR RATE, ECG03: 60 BPM
WBC # BLD AUTO: 6.8 K/UL (ref 4.6–13.2)

## 2017-11-21 PROCEDURE — 80048 BASIC METABOLIC PNL TOTAL CA: CPT

## 2017-11-21 PROCEDURE — 85025 COMPLETE CBC W/AUTO DIFF WBC: CPT

## 2017-11-21 PROCEDURE — 93005 ELECTROCARDIOGRAM TRACING: CPT

## 2017-11-21 PROCEDURE — 36415 COLL VENOUS BLD VENIPUNCTURE: CPT

## 2017-12-04 ENCOUNTER — ANESTHESIA EVENT (OUTPATIENT)
Dept: SURGERY | Age: 62
End: 2017-12-04
Payer: MEDICARE

## 2017-12-05 ENCOUNTER — ANESTHESIA (OUTPATIENT)
Dept: SURGERY | Age: 62
End: 2017-12-05
Payer: MEDICARE

## 2017-12-05 ENCOUNTER — HOSPITAL ENCOUNTER (OUTPATIENT)
Age: 62
Setting detail: OUTPATIENT SURGERY
Discharge: HOME | End: 2017-12-05
Payer: MEDICARE

## 2017-12-05 VITALS
OXYGEN SATURATION: 94 % | SYSTOLIC BLOOD PRESSURE: 120 MMHG | HEIGHT: 72 IN | BODY MASS INDEX: 25.87 KG/M2 | WEIGHT: 191 LBS | TEMPERATURE: 97 F | HEART RATE: 70 BPM | RESPIRATION RATE: 14 BRPM | DIASTOLIC BLOOD PRESSURE: 73 MMHG

## 2017-12-05 DIAGNOSIS — K40.90 RIGHT INGUINAL HERNIA: Primary | ICD-10-CM

## 2017-12-05 PROCEDURE — 77030031139 HC SUT VCRL2 J&J -A

## 2017-12-05 PROCEDURE — 77030011640 HC PAD GRND REM COVD -A

## 2017-12-05 PROCEDURE — 74011250637 HC RX REV CODE- 250/637: Performed by: NURSE ANESTHETIST, CERTIFIED REGISTERED

## 2017-12-05 PROCEDURE — 74011250636 HC RX REV CODE- 250/636: Performed by: NURSE ANESTHETIST, CERTIFIED REGISTERED

## 2017-12-05 PROCEDURE — 74011250636 HC RX REV CODE- 250/636

## 2017-12-05 PROCEDURE — C1781 MESH (IMPLANTABLE): HCPCS

## 2017-12-05 PROCEDURE — 76210000026 HC REC RM PH II 1 TO 1.5 HR

## 2017-12-05 PROCEDURE — 77030018836 HC SOL IRR NACL ICUM -A

## 2017-12-05 PROCEDURE — 77030026438 HC STYL ET INTUB CARD -A: Performed by: ANESTHESIOLOGY

## 2017-12-05 PROCEDURE — 74011000250 HC RX REV CODE- 250

## 2017-12-05 PROCEDURE — 77030018548 HC SUT ETHBND2 J&J -B

## 2017-12-05 PROCEDURE — 77030012422 HC DRN WND COVD -A

## 2017-12-05 PROCEDURE — 74011000258 HC RX REV CODE- 258: Performed by: NURSE ANESTHETIST, CERTIFIED REGISTERED

## 2017-12-05 PROCEDURE — 77030020782 HC GWN BAIR PAWS FLX 3M -B

## 2017-12-05 PROCEDURE — 77030032490 HC SLV COMPR SCD KNE COVD -B

## 2017-12-05 PROCEDURE — 76210000016 HC OR PH I REC 1 TO 1.5 HR

## 2017-12-05 PROCEDURE — 77030003028 HC SUT VCRL J&J -A

## 2017-12-05 PROCEDURE — 77030002933 HC SUT MCRYL J&J -A

## 2017-12-05 PROCEDURE — 77030008683 HC TU ET CUF COVD -A: Performed by: ANESTHESIOLOGY

## 2017-12-05 PROCEDURE — 76060000033 HC ANESTHESIA 1 TO 1.5 HR

## 2017-12-05 PROCEDURE — 76010000149 HC OR TIME 1 TO 1.5 HR

## 2017-12-05 DEVICE — PERFIX PLUG, 1.5" X 2.0" (3.8 CM X 5.0 CM), EXTRA LARGE (CONTENTS: 2)
Type: IMPLANTABLE DEVICE | Site: INGUINAL | Status: FUNCTIONAL
Brand: PERFIX

## 2017-12-05 DEVICE — PERFIX PLUG, 1.6" X 1.9" (4.1 CM X 4.8 CM), LARGE (CONTENTS: 2)
Type: IMPLANTABLE DEVICE | Site: INGUINAL | Status: FUNCTIONAL
Brand: PERFIX

## 2017-12-05 RX ORDER — OXYCODONE AND ACETAMINOPHEN 5; 325 MG/1; MG/1
1 TABLET ORAL
Qty: 30 TAB | Refills: 0 | Status: ON HOLD | OUTPATIENT
Start: 2017-12-05 | End: 2018-06-28

## 2017-12-05 RX ORDER — LIDOCAINE HYDROCHLORIDE 20 MG/ML
INJECTION, SOLUTION EPIDURAL; INFILTRATION; INTRACAUDAL; PERINEURAL AS NEEDED
Status: DISCONTINUED | OUTPATIENT
Start: 2017-12-05 | End: 2017-12-05 | Stop reason: HOSPADM

## 2017-12-05 RX ORDER — FAMOTIDINE 20 MG/1
20 TABLET, FILM COATED ORAL ONCE
Status: COMPLETED | OUTPATIENT
Start: 2017-12-05 | End: 2017-12-05

## 2017-12-05 RX ORDER — OXYCODONE AND ACETAMINOPHEN 5; 325 MG/1; MG/1
1 TABLET ORAL
Status: DISCONTINUED | OUTPATIENT
Start: 2017-12-05 | End: 2017-12-05 | Stop reason: HOSPADM

## 2017-12-05 RX ORDER — CEFAZOLIN SODIUM 2 G/50ML
2 SOLUTION INTRAVENOUS ONCE
Status: COMPLETED | OUTPATIENT
Start: 2017-12-05 | End: 2017-12-05

## 2017-12-05 RX ORDER — SODIUM CHLORIDE 0.9 % (FLUSH) 0.9 %
5-10 SYRINGE (ML) INJECTION EVERY 8 HOURS
Status: DISCONTINUED | OUTPATIENT
Start: 2017-12-05 | End: 2017-12-05 | Stop reason: HOSPADM

## 2017-12-05 RX ORDER — SODIUM CHLORIDE 0.9 % (FLUSH) 0.9 %
5-10 SYRINGE (ML) INJECTION AS NEEDED
Status: DISCONTINUED | OUTPATIENT
Start: 2017-12-05 | End: 2017-12-05 | Stop reason: HOSPADM

## 2017-12-05 RX ORDER — SUCCINYLCHOLINE CHLORIDE 20 MG/ML
INJECTION INTRAMUSCULAR; INTRAVENOUS AS NEEDED
Status: DISCONTINUED | OUTPATIENT
Start: 2017-12-05 | End: 2017-12-05 | Stop reason: HOSPADM

## 2017-12-05 RX ORDER — DEXAMETHASONE SODIUM PHOSPHATE 4 MG/ML
INJECTION, SOLUTION INTRA-ARTICULAR; INTRALESIONAL; INTRAMUSCULAR; INTRAVENOUS; SOFT TISSUE AS NEEDED
Status: DISCONTINUED | OUTPATIENT
Start: 2017-12-05 | End: 2017-12-05 | Stop reason: HOSPADM

## 2017-12-05 RX ORDER — MIDAZOLAM HYDROCHLORIDE 1 MG/ML
INJECTION, SOLUTION INTRAMUSCULAR; INTRAVENOUS AS NEEDED
Status: DISCONTINUED | OUTPATIENT
Start: 2017-12-05 | End: 2017-12-05 | Stop reason: HOSPADM

## 2017-12-05 RX ORDER — FENTANYL CITRATE 50 UG/ML
INJECTION, SOLUTION INTRAMUSCULAR; INTRAVENOUS AS NEEDED
Status: DISCONTINUED | OUTPATIENT
Start: 2017-12-05 | End: 2017-12-05 | Stop reason: HOSPADM

## 2017-12-05 RX ORDER — LIDOCAINE HYDROCHLORIDE 10 MG/ML
0.1 INJECTION, SOLUTION EPIDURAL; INFILTRATION; INTRACAUDAL; PERINEURAL AS NEEDED
Status: DISCONTINUED | OUTPATIENT
Start: 2017-12-05 | End: 2017-12-05 | Stop reason: HOSPADM

## 2017-12-05 RX ORDER — ROCURONIUM BROMIDE 10 MG/ML
INJECTION, SOLUTION INTRAVENOUS AS NEEDED
Status: DISCONTINUED | OUTPATIENT
Start: 2017-12-05 | End: 2017-12-05 | Stop reason: HOSPADM

## 2017-12-05 RX ORDER — NALOXONE HYDROCHLORIDE 0.4 MG/ML
0.1 INJECTION, SOLUTION INTRAMUSCULAR; INTRAVENOUS; SUBCUTANEOUS ONCE
Status: DISCONTINUED | OUTPATIENT
Start: 2017-12-05 | End: 2017-12-05 | Stop reason: HOSPADM

## 2017-12-05 RX ORDER — HYDROMORPHONE HYDROCHLORIDE 2 MG/ML
0.5 INJECTION, SOLUTION INTRAMUSCULAR; INTRAVENOUS; SUBCUTANEOUS AS NEEDED
Status: DISCONTINUED | OUTPATIENT
Start: 2017-12-05 | End: 2017-12-05 | Stop reason: HOSPADM

## 2017-12-05 RX ORDER — ONDANSETRON 2 MG/ML
INJECTION INTRAMUSCULAR; INTRAVENOUS AS NEEDED
Status: DISCONTINUED | OUTPATIENT
Start: 2017-12-05 | End: 2017-12-05 | Stop reason: HOSPADM

## 2017-12-05 RX ORDER — ONDANSETRON 2 MG/ML
4 INJECTION INTRAMUSCULAR; INTRAVENOUS
Status: COMPLETED | OUTPATIENT
Start: 2017-12-05 | End: 2017-12-05

## 2017-12-05 RX ORDER — INSULIN LISPRO 100 [IU]/ML
INJECTION, SOLUTION INTRAVENOUS; SUBCUTANEOUS ONCE
Status: DISCONTINUED | OUTPATIENT
Start: 2017-12-05 | End: 2017-12-05 | Stop reason: HOSPADM

## 2017-12-05 RX ORDER — SODIUM CHLORIDE, SODIUM LACTATE, POTASSIUM CHLORIDE, CALCIUM CHLORIDE 600; 310; 30; 20 MG/100ML; MG/100ML; MG/100ML; MG/100ML
75 INJECTION, SOLUTION INTRAVENOUS CONTINUOUS
Status: DISCONTINUED | OUTPATIENT
Start: 2017-12-05 | End: 2017-12-05 | Stop reason: HOSPADM

## 2017-12-05 RX ORDER — BUPIVACAINE HYDROCHLORIDE 5 MG/ML
INJECTION, SOLUTION PERINEURAL AS NEEDED
Status: DISCONTINUED | OUTPATIENT
Start: 2017-12-05 | End: 2017-12-05 | Stop reason: HOSPADM

## 2017-12-05 RX ORDER — SODIUM CHLORIDE, SODIUM LACTATE, POTASSIUM CHLORIDE, CALCIUM CHLORIDE 600; 310; 30; 20 MG/100ML; MG/100ML; MG/100ML; MG/100ML
75 INJECTION, SOLUTION INTRAVENOUS CONTINUOUS
Status: DISPENSED | OUTPATIENT
Start: 2017-12-05 | End: 2017-12-05

## 2017-12-05 RX ORDER — ONDANSETRON 2 MG/ML
INJECTION INTRAMUSCULAR; INTRAVENOUS
Status: COMPLETED
Start: 2017-12-05 | End: 2017-12-05

## 2017-12-05 RX ORDER — PROMETHAZINE HYDROCHLORIDE 25 MG/ML
INJECTION, SOLUTION INTRAMUSCULAR; INTRAVENOUS
Status: DISCONTINUED
Start: 2017-12-05 | End: 2017-12-05 | Stop reason: HOSPADM

## 2017-12-05 RX ORDER — PROPOFOL 10 MG/ML
INJECTION, EMULSION INTRAVENOUS AS NEEDED
Status: DISCONTINUED | OUTPATIENT
Start: 2017-12-05 | End: 2017-12-05 | Stop reason: HOSPADM

## 2017-12-05 RX ADMIN — SUCCINYLCHOLINE CHLORIDE 120 MG: 20 INJECTION INTRAMUSCULAR; INTRAVENOUS at 10:33

## 2017-12-05 RX ADMIN — SODIUM CHLORIDE, SODIUM LACTATE, POTASSIUM CHLORIDE, AND CALCIUM CHLORIDE 75 ML/HR: 600; 310; 30; 20 INJECTION, SOLUTION INTRAVENOUS at 08:17

## 2017-12-05 RX ADMIN — HYDROMORPHONE HYDROCHLORIDE 0.5 MG: 2 INJECTION, SOLUTION INTRAMUSCULAR; INTRAVENOUS; SUBCUTANEOUS at 12:49

## 2017-12-05 RX ADMIN — ROCURONIUM BROMIDE 5 MG: 10 INJECTION, SOLUTION INTRAVENOUS at 10:33

## 2017-12-05 RX ADMIN — DEXAMETHASONE SODIUM PHOSPHATE 4 MG: 4 INJECTION, SOLUTION INTRA-ARTICULAR; INTRALESIONAL; INTRAMUSCULAR; INTRAVENOUS; SOFT TISSUE at 10:45

## 2017-12-05 RX ADMIN — FENTANYL CITRATE 100 MCG: 50 INJECTION, SOLUTION INTRAMUSCULAR; INTRAVENOUS at 10:58

## 2017-12-05 RX ADMIN — ROCURONIUM BROMIDE 35 MG: 10 INJECTION, SOLUTION INTRAVENOUS at 10:54

## 2017-12-05 RX ADMIN — FENTANYL CITRATE 100 MCG: 50 INJECTION, SOLUTION INTRAMUSCULAR; INTRAVENOUS at 10:33

## 2017-12-05 RX ADMIN — CEFAZOLIN SODIUM 2 G: 2 SOLUTION INTRAVENOUS at 10:30

## 2017-12-05 RX ADMIN — ONDANSETRON 4 MG: 2 INJECTION INTRAMUSCULAR; INTRAVENOUS at 12:09

## 2017-12-05 RX ADMIN — ONDANSETRON 4 MG: 2 INJECTION INTRAMUSCULAR; INTRAVENOUS at 11:26

## 2017-12-05 RX ADMIN — SODIUM CHLORIDE, SODIUM LACTATE, POTASSIUM CHLORIDE, AND CALCIUM CHLORIDE 75 ML/HR: 600; 310; 30; 20 INJECTION, SOLUTION INTRAVENOUS at 13:40

## 2017-12-05 RX ADMIN — PROMETHAZINE HYDROCHLORIDE 6.25 MG: 25 INJECTION INTRAMUSCULAR; INTRAVENOUS at 12:18

## 2017-12-05 RX ADMIN — FAMOTIDINE 20 MG: 20 TABLET, FILM COATED ORAL at 08:17

## 2017-12-05 RX ADMIN — MIDAZOLAM HYDROCHLORIDE 2 MG: 1 INJECTION, SOLUTION INTRAMUSCULAR; INTRAVENOUS at 10:27

## 2017-12-05 RX ADMIN — PROPOFOL 150 MG: 10 INJECTION, EMULSION INTRAVENOUS at 10:33

## 2017-12-05 RX ADMIN — LIDOCAINE HYDROCHLORIDE 40 MG: 20 INJECTION, SOLUTION EPIDURAL; INFILTRATION; INTRACAUDAL; PERINEURAL at 10:33

## 2017-12-05 RX ADMIN — HYDROMORPHONE HYDROCHLORIDE 0.5 MG: 2 INJECTION, SOLUTION INTRAMUSCULAR; INTRAVENOUS; SUBCUTANEOUS at 12:20

## 2017-12-05 RX ADMIN — PROPOFOL 50 MG: 10 INJECTION, EMULSION INTRAVENOUS at 10:39

## 2017-12-05 NOTE — DISCHARGE INSTRUCTIONS
Hernia Repair: What to Expect at [address] are likely to have pain for the next few days. You may also feel like you have the flu, and you may have a low fever and feel tired and nauseated. This is common. You should feel better after a few days and will probably feel much better in 7 days. For several weeks you may feel twinges or pulling in the hernia repair when you move. You may have some bruising on the scrotum and along the penis. This is normal. Men will need to wear a jockstrap or briefs, not boxers, for scrotal support for several days after a groin (inguinal) hernia repair. [employer] bicycle shorts may provide good support. This care sheet gives you a general idea about how long it will take for you to recover. But each person recovers at a different pace. Follow the steps below to get better as quickly as possible. How can you care for yourself at home? Activity  ? · Rest when you feel tired. Getting enough sleep will help you recover. ? · Try to walk each day. Start by walking a little more than you did the day before. Bit by bit, increase the amount you walk. Walking boosts blood flow and helps prevent pneumonia and constipation. ? · Avoid strenuous activities, such as biking, jogging, weight lifting, or aerobic exercise, until your doctor says it is okay. ? · Avoid lifting anything that would make you strain. This may include heavy grocery bags and milk containers, a heavy briefcase or backpack, cat litter or dog food bags, a vacuum , or a child. ? · You may drive when you are no longer taking pain medicine and can quickly move your foot from the gas pedal to the brake. You must also be able to sit comfortably for a long period of time, even if you do not plan to go far. You might get caught in traffic. ? · Most people are able to return to work within 1 to 2 weeks after surgery. ? · You may shower 24 to 48 hours after surgery, if your doctor okays it.  Pat the cut (incision) dry. Do not take a bath for the first 2 weeks, or until your doctor tells you it is okay. ? · Your doctor will tell you when you can have sex again. Diet  ? · You can eat your normal diet. If your stomach is upset, try bland, low-fat foods like plain rice, broiled chicken, toast, and yogurt. ? · Drink plenty of fluids (unless your doctor tells you not to). ? · You may notice that your bowel movements are not regular right after your surgery. This is common. Avoid constipation and straining with bowel movements. You may want to take a fiber supplement every day. If you have not had a bowel movement after a couple of days, ask your doctor about taking a mild laxative. Medicines  ? · Your doctor will tell you if and when you can restart your medicines. He or she will also give you instructions about taking any new medicines. ? · If you take blood thinners, such as warfarin (Coumadin), clopidogrel (Plavix), or aspirin, be sure to talk to your doctor. He or she will tell you if and when to start taking those medicines again. Make sure that you understand exactly what your doctor wants you to do. ? · Be safe with medicines. Take pain medicines exactly as directed. ¨ If the doctor gave you a prescription medicine for pain, take it as prescribed. ¨ If you are not taking a prescription pain medicine, take an over-the-counter medicine such as acetaminophen (Tylenol), ibuprofen (Advil, Motrin), or naproxen (Aleve). Read and follow all instructions on the label. ¨ Do not take two or more pain medicines at the same time unless the doctor told you to. Many pain medicines have acetaminophen, which is Tylenol. Too much acetaminophen (Tylenol) can be harmful. ? · If your doctor prescribed antibiotics, take them as directed. Do not stop taking them just because you feel better. You need to take the full course of antibiotics. ?  · If you think your pain medicine is making you sick to your stomach:  ¨ Take your medicine after meals (unless your doctor has told you not to). ¨ Ask your doctor for a different pain medicine. Incision care  ? · If you have strips of tape on the cut (incision) the doctor made, leave the tape on for a week or until it falls off.   ? · If you have staples closing the cut, you will need to visit your doctor in 1 to 2 weeks to have them removed. ? · Wash the area daily with warm, soapy water and pat it dry. Follow-up care is a key part of your treatment and safety. Be sure to make and go to all appointments, and call your doctor if you are having problems. It's also a good idea to know your test results and keep a list of the medicines you take. When should you call for help? Call 911 anytime you think you may need emergency care. For example, call if:  ? · You passed out (lost consciousness). ? · You are short of breath. ?Call your doctor now or seek immediate medical care if:  ? · You have pain that does not get better after you take pain medicine. ? · You are sick to your stomach and cannot keep fluids down. ? · You have signs of a blood clot in your leg (called a deep vein thrombosis), such as:  ¨ Pain in your calf, back of the knee, thigh, or groin. ¨ Redness and swelling in your leg or groin. ? · You cannot pass stools or gas. ? · Bright red blood has soaked through the bandage over your incision. ? · You have loose stitches, or your incision comes open. ? · You have signs of infection, such as:  ¨ Increased pain, swelling, warmth, or redness. ¨ Red streaks leading from the incision. ¨ Pus draining from the incision. ¨ A fever. ? Watch closely for any changes in your health, and be sure to contact your doctor if you have any problems. Where can you learn more? Go to http://messi-thang.info/. Enter Y351 in the search box to learn more about \"Hernia Repair: What to Expect at Home. \"  Current as of:  May 12, 2017  Content Version: 11.4  © 5167-2899 [employer]. Care instructions adapted under license by [employer] (which disclaims liability or warranty for this information). If you have questions about a medical condition or this instruction, always ask your healthcare professional. [address] any warranty or liability for your use of this information. DISCHARGE SUMMARY from Nurse    PATIENT INSTRUCTIONS:    After general anesthesia or intravenous sedation, for 24 hours or while taking prescription Narcotics:  · Limit your activities  · Do not drive and operate hazardous machinery  · Do not make important personal or business decisions  · Do  not drink alcoholic beverages  · If you have not urinated within 8 hours after discharge, please contact your surgeon on call. Report the following to your surgeon:  · Excessive pain, swelling, redness or odor of or around the surgical area  · Temperature over 100.5  · Nausea and vomiting lasting longer than 4 hours or if unable to take medications  · Any signs of decreased circulation or nerve impairment to extremity: change in color, persistent  numbness, tingling, coldness or increase pain  · Any questions    *  Please give a list of your current medications to your Primary Care Provider. *  Please update this list whenever your medications are discontinued, doses are      changed, or new medications (including over-the-counter products) are added. *  Please carry medication information at all times in case of emergency situations. These are general instructions for a healthy lifestyle:    No smoking/ No tobacco products/ Avoid exposure to second hand smoke  Surgeon General's Warning:  Quitting smoking now greatly reduces serious risk to your health.     Obesity, smoking, and sedentary lifestyle greatly increases your risk for illness    A healthy diet, regular physical exercise & weight monitoring are important for maintaining a healthy lifestyle    You may be retaining fluid if you have a history of heart failure or if you experience any of the following symptoms:  Weight gain of 3 pounds or more overnight or 5 pounds in a week, increased swelling in our hands or feet or shortness of breath while lying flat in bed. Please call your doctor as soon as you notice any of these symptoms; do not wait until your next office visit. Recognize signs and symptoms of STROKE:    F-face looks uneven    A-arms unable to move or move unevenly    S-speech slurred or non-existent    T-time-call 911 as soon as signs and symptoms begin-DO NOT go       Back to bed or wait to see if you get better-TIME IS BRAIN. Warning Signs of HEART ATTACK     Call 911 if you have these symptoms:   Chest discomfort. Most heart attacks involve discomfort in the center of the chest that lasts more than a few minutes, or that goes away and comes back. It can feel like uncomfortable pressure, squeezing, fullness, or pain.  Discomfort in other areas of the upper body. Symptoms can include pain or discomfort in one or both arms, the back, neck, jaw, or stomach.  Shortness of breath with or without chest discomfort.  Other signs may include breaking out in a cold sweat, nausea, or lightheadedness. Don't wait more than five minutes to call 911 - MINUTES MATTER! Fast action can save your life. Calling 911 is almost always the fastest way to get lifesaving treatment. Emergency Medical Services staff can begin treatment when they arrive -- up to an hour sooner than if someone gets to the hospital by car. The discharge information has been reviewed with the patient. The patient verbalized understanding. Discharge medications reviewed with the patient and appropriate educational materials and side effects teaching were provided.   ___________________________________________________________________________________________________________________________________ Oxycodone/Acetaminophen (Percocet, Roxicet) - (By mouth)   Why this medicine is used:   Treats pain. This medicine contains a narcotic pain reliever. Contact a nurse or doctor right away if you have:  · Extreme weakness, shallow breathing, slow heartbeat  · Sweating or cold, clammy skin  · Skin blisters, rash, or peeling     Common side effects:  · Constipation  · Nausea, vomiting  · Tiredness  © 2017 [address] [employer] [employer] Information is for End User's use only and may not be sold, redistributed or otherwise used for commercial purposes.

## 2017-12-05 NOTE — H&P (VIEW-ONLY)
Progress Note    Patient: Dave Al Jr. MRN: [id number]  SSN: [id number]   YOB: 1955  Age: 60 y.o. Sex: male     Chief Complaint   Patient presents with    Follow-up       HPI    Mr. Rhoda Adikns returns after being cleared for his hernia repair by his PCP as well as cardiology. Will make plans for his surgery. I discussed again the the procedure and the technical details he understands that and is anxious to proceed. Past Medical History:   Diagnosis Date    Arthritis     Cancer of bone ([address]     COPD     Heart failure (HCC)     stents x2 in 2008    Hypertension      Past Surgical History:   Procedure Laterality Date    CARDIAC SURG PROCEDURE UNLIST      COLONOSCOPY N/A 8/15/2016    COLONOSCOPY with polypectomy, biopsy and clip performed by Jose Bray MD at [facility]    HX CORONARY STENT PLACEMENT  6-5-13    HX HEART CATHETERIZATION  6-5-13     No Known Allergies  Current Outpatient Prescriptions   Medication Sig Dispense Refill    atorvastatin (LIPITOR) 20 mg tablet Take  by mouth daily.  cholecalciferol, VITAMIN D3, (VITAMIN D3) 5,000 unit tab tablet Take 5,000 Units by mouth daily.  aspirin (ASPIRIN) 325 mg tablet Take 325 mg by mouth daily.  esomeprazole (NEXIUM) 20 mg capsule Take 20 mg by mouth daily.  oxyCODONE-acetaminophen (PERCOCET) 5-325 mg per tablet Take 1 to 2 tablet every 6 hours as needed for pain control. If you were instructed to try over the counter ibuprofen or tylenol, only take the percocet for pain not controlled with the over the counter medication. 20 Tab 0    ibuprofen (MOTRIN) 600 mg tablet Take 1 Tab by mouth every six (6) hours as needed for Pain. 30 Tab 0     Social History     Social History    Marital status: SINGLE     Spouse name: N/A    Number of children: N/A    Years of education: N/A     Occupational History    Not on file.      Social History Main Topics    Smoking status: Former Smoker     Packs/day: 2.00 Years: 39.00     Quit date: 8/10/2012    Smokeless tobacco: Never Used    Alcohol use No    Drug use: No    Sexual activity: No     Other Topics Concern    Not on file     Social History Narrative     Family History   Problem Relation Age of Onset    Stroke Mother     Heart Disease Father          Review of systems:  Patient denies any reflux, emesis, abdominal pain, change in bowel habits, hematochezia, melena, fever, weight loss, fatigue chills, dermatitis, abnormal moles, change in vision, vertigo, epistaxis, dysphagia, hoarseness, chest pain, palpitations, hypertension, edema, cough, shortness of breath, wheezing, hemoptysis, snoring, hematuria, diabetes, thyroid disease, anemia, bruising, history of blood transfusion, dizziness, headache, or fainting. Physical Examination    Well developed well nourished male in no apparent distress  Visit Vitals    /80    Resp 18      Head: normocephalic, atraumatic  Mouth: Clear, no overt lesions, oral mucosa pink and moist  Neck: supple, no masses, no adenopathy or carotid bruits, trachea midline  Resp: clear to auscultation bilaterally, no wheeze, rhonchi or rales, excursions normal and symmetrical  Cardio: Regular rate and rhythm, no murmurs, clicks, gallops or rubs, no edema or varicosities  Abdomen: soft, nontender, nondistended, normoactive bowel sounds, large right inguinal hernia, no hepatosplenomegaly,   Back: Deferred  Extremeties: warm, well-perfused, no tenderness or swelling, normal gait/station  Neuro: sensation and strength grossly intact and symmetrical  Psych: alert and oriented to person, place and time  Breast exam deferred    IMPRESSION  Large right inguinal hernia now with medical and cardiac clearance    PLAN  Orders Placed This Encounter    atorvastatin (LIPITOR) 20 mg tablet     Sig: Take  by mouth daily.      Plan procedure  Milena Ware MD

## 2017-12-05 NOTE — PERIOP NOTES
Assuming care of patient for lunch relief. Report received, patient resting quietly eyes closed. VSS. Assessment complete.

## 2017-12-05 NOTE — IP AVS SNAPSHOT
[address] Patient: Dave Al Jr. MRN: [id number] OGT:[id number] About your hospitalization You were admitted on:  December 5, 2017 You last received care in the:  [facility] PHASE 2 RECOVERY You were discharged on:  December 5, 2017 Why you were hospitalized Your primary diagnosis was:  Not on File Things You Need To Do (next 8 weeks) Follow up with Johan Baptiste MD  
  
Phone:  [phone] Where:  [address], [address] [address] Follow up with Bebe Moore MD  
Follow up as scheduled. Phone:  [phone] Where:  [address], [address], [address] [facility], [address] Monday Dec 18, 2017 PROCEDURE with [facility] IMAGING 2 at  8:00 AM  
Where: [facility] ([facility]) PROCEDURE with [facility] NONIMAGING at  9:00 AM  
Where: [facility] ([facility]) Wednesday Dec 20, 2017 HOSPITAL DISCHARGE with Bebe Moore MD at 11:30 AM  
Where: [address] ([facility]) Wednesday Dec 27, 2017 Follow Up with Guy Griffiths MD at 11:00 AM  
Where: [facility] ([facility]) Discharge Orders Procedure Order Date Status Priority Quantity Spec Type Associated Dx DIET REGULAR 12/05/17 1137 Normal Routine 1  Right inguinal hernia [[id number]] NURSING-MISCELLANEOUS: discharge after voiding 12/05/17 1137 Normal Routine 1  Right inguinal hernia [[id number]] Questions: Description of Order:  discharge after voiding A check gil indicates which time of day the medication should be taken. My Medications TAKE these medications as instructed Instructions Each Dose to Equal  
 Morning Noon Evening Bedtime  
 aspirin 325 mg tablet Commonly known as:  ASPIRIN Your last dose was: Your next dose is: Take 325 mg by mouth daily. 325 mg  
    
   
   
   
  
 cholecalciferol (VITAMIN D3) 5,000 unit Tab tablet Commonly known as:  VITAMIN D3 Your last dose was: Your next dose is: Take 5,000 Units by mouth daily. 5000 Units  
    
   
   
   
  
 ibuprofen 600 mg tablet Commonly known as:  MOTRIN Your last dose was: Your next dose is: Take 1 Tab by mouth every six (6) hours as needed for Pain. 600 mg  
    
   
   
   
  
 LIPITOR 20 mg tablet Generic drug:  atorvastatin Your last dose was: Your next dose is: Take  by mouth daily. NexIUM 20 mg capsule Generic drug:  esomeprazole Your last dose was: Your next dose is: Take 20 mg by mouth daily. 20 mg  
    
   
   
   
  
 * oxyCODONE-acetaminophen 5-325 mg per tablet Commonly known as:  PERCOCET Your last dose was: Your next dose is: Take 1 to 2 tablet every 6 hours as needed for pain control. If you were instructed to try over the counter ibuprofen or tylenol, only take the percocet for pain not controlled with the over the counter medication. * oxyCODONE-acetaminophen 5-325 mg per tablet Commonly known as:  PERCOCET Your last dose was: Your next dose is: Take 1 Tab by mouth every six (6) hours as needed for Pain. Max Daily Amount: 4 Tabs. 1 Tab * Notice: This list has 2 medication(s) that are the same as other medications prescribed for you. Read the directions carefully, and ask your doctor or other care provider to review them with you. Where to Get Your Medications Information on where to get these meds will be given to you by the nurse or doctor. ! Ask your nurse or doctor about these medications  
  oxyCODONE-acetaminophen 5-325 mg per tablet Discharge Instructions Hernia Repair: What to Expect at Home Your Recovery You are likely to have pain for the next few days. You may also feel like you have the flu, and you may have a low fever and feel tired and nauseated. This is common. You should feel better after a few days and will probably feel much better in 7 days. For several weeks you may feel twinges or pulling in the hernia repair when you move. You may have some bruising on the scrotum and along the penis. This is normal. Men will need to wear a jockstrap or briefs, not boxers, for scrotal support for several days after a groin (inguinal) hernia repair. [place] bicycle shorts may provide good support. This care sheet gives you a general idea about how long it will take for you to recover. But each person recovers at a different pace. Follow the steps below to get better as quickly as possible. How can you care for yourself at home? Activity ? · Rest when you feel tired. Getting enough sleep will help you recover. ? · Try to walk each day. Start by walking a little more than you did the day before. Bit by bit, increase the amount you walk. Walking boosts blood flow and helps prevent pneumonia and constipation. ? · Avoid strenuous activities, such as biking, jogging, weight lifting, or aerobic exercise, until your doctor says it is okay. ? · Avoid lifting anything that would make you strain. This may include heavy grocery bags and milk containers, a heavy briefcase or backpack, cat litter or dog food bags, a vacuum , or a child. ? · You may drive when you are no longer taking pain medicine and can quickly move your foot from the gas pedal to the brake. You must also be able to sit comfortably for a long period of time, even if you do not plan to go far. You might get caught in traffic. ? · Most people are able to return to work within 1 to 2 weeks after surgery. ? · You may shower 24 to 48 hours after surgery, if your doctor okays it. Pat the cut (incision) dry. Do not take a bath for the first 2 weeks, or until your doctor tells you it is okay. ? · Your doctor will tell you when you can have sex again. Diet ? · You can eat your normal diet. If your stomach is upset, try bland, low-fat foods like plain rice, broiled chicken, toast, and yogurt. ? · Drink plenty of fluids (unless your doctor tells you not to). ? · You may notice that your bowel movements are not regular right after your surgery. This is common. Avoid constipation and straining with bowel movements. You may want to take a fiber supplement every day. If you have not had a bowel movement after a couple of days, ask your doctor about taking a mild laxative. Medicines ? · Your doctor will tell you if and when you can restart your medicines. He or she will also give you instructions about taking any new medicines. ? · If you take blood thinners, such as warfarin (Coumadin), clopidogrel (Plavix), or aspirin, be sure to talk to your doctor. He or she will tell you if and when to start taking those medicines again. Make sure that you understand exactly what your doctor wants you to do. ? · Be safe with medicines. Take pain medicines exactly as directed. ¨ If the doctor gave you a prescription medicine for pain, take it as prescribed. ¨ If you are not taking a prescription pain medicine, take an over-the-counter medicine such as acetaminophen (Tylenol), ibuprofen (Advil, Motrin), or naproxen (Aleve). Read and follow all instructions on the label. ¨ Do not take two or more pain medicines at the same time unless the doctor told you to. Many pain medicines have acetaminophen, which is Tylenol. Too much acetaminophen (Tylenol) can be harmful. ? · If your doctor prescribed antibiotics, take them as directed.  Do not stop taking them just because you feel better. You need to take the full course of antibiotics. ? · If you think your pain medicine is making you sick to your stomach: 
¨ Take your medicine after meals (unless your doctor has told you not to). ¨ Ask your doctor for a different pain medicine. Incision care ? · If you have strips of tape on the cut (incision) the doctor made, leave the tape on for a week or until it falls off.  
? · If you have staples closing the cut, you will need to visit your doctor in 1 to 2 weeks to have them removed. ? · Wash the area daily with warm, soapy water and pat it dry. Follow-up care is a key part of your treatment and safety. Be sure to make and go to all appointments, and call your doctor if you are having problems. It's also a good idea to know your test results and keep a list of the medicines you take. When should you call for help? Call 911 anytime you think you may need emergency care. For example, call if: 
? · You passed out (lost consciousness). ? · You are short of breath. ?Call your doctor now or seek immediate medical care if: 
? · You have pain that does not get better after you take pain medicine. ? · You are sick to your stomach and cannot keep fluids down. ? · You have signs of a blood clot in your leg (called a deep vein thrombosis), such as: 
¨ Pain in your calf, back of the knee, thigh, or groin. ¨ Redness and swelling in your leg or groin. ? · You cannot pass stools or gas. ? · Bright red blood has soaked through the bandage over your incision. ? · You have loose stitches, or your incision comes open. ? · You have signs of infection, such as: 
¨ Increased pain, swelling, warmth, or redness. ¨ Red streaks leading from the incision. ¨ Pus draining from the incision. ¨ A fever. ? Watch closely for any changes in your health, and be sure to contact your doctor if you have any problems. Where can you learn more? Go to http://messi-thang.info/. Enter H264 in the search box to learn more about \"Hernia Repair: What to Expect at Home. \" Current as of: May 12, 2017 Content Version: 11.4 © 4471-6505 [employer]. Care instructions adapted under license by [employer] (which disclaims liability or warranty for this information). If you have questions about a medical condition or this instruction, always ask your healthcare professional. [address] any warranty or liability for your use of this information. DISCHARGE SUMMARY from Nurse PATIENT INSTRUCTIONS: 
 
 
F-face looks uneven A-arms unable to move or move unevenly S-speech slurred or non-existent T-time-call 911 as soon as signs and symptoms begin-DO NOT go Back to bed or wait to see if you get better-TIME IS BRAIN. Warning Signs of HEART ATTACK Call 911 if you have these symptoms: 
? Chest discomfort. Most heart attacks involve discomfort in the center of the chest that lasts more than a few minutes, or that goes away and comes back. It can feel like uncomfortable pressure, squeezing, fullness, or pain. ? Discomfort in other areas of the upper body. Symptoms can include pain or discomfort in one or both arms, the back, neck, jaw, or stomach. ? Shortness of breath with or without chest discomfort. ? Other signs may include breaking out in a cold sweat, nausea, or lightheadedness. Don't wait more than five minutes to call [address]! Fast action can save your life. Calling 911 is almost always the fastest way to get lifesaving treatment. Emergency Medical Services staff can begin treatment when they arrive  up to an hour sooner than if someone gets to the hospital by car. The discharge information has been reviewed with the patient. The patient verbalized understanding.  
Discharge medications reviewed with the patient and appropriate educational materials and side effects teaching were provided. ___________________________________________________________________________________________________________________________________ Oxycodone/Acetaminophen (Percocet, Roxicet) - (By mouth) Why this medicine is used:  
Treats pain. This medicine contains a narcotic pain reliever. Contact a nurse or doctor right away if you have: 
· Extreme weakness, shallow breathing, slow heartbeat · Sweating or cold, clammy skin · Skin blisters, rash, or peeling Common side effects: 
· Constipation · Nausea, vomiting · Tiredness © 2017 [facility] Information is for End User's use only and may not be sold, redistributed or otherwise used for commercial purposes. Providers Seen During Your Hospitalization Provider Specialty Primary office phone Kerry Montanez MD Surgery [phone] Your Primary Care Physician (PCP) Primary Care Physician Office Phone Office Fax Thony Holt [phone] You are allergic to the following No active allergies Recent Documentation Height Weight BMI Smoking Status 1.829 m 86.6 kg 25.9 kg/m2 Former Smoker Emergency Contacts Name Discharge Info Relation Home Work Mobile Aero Glass,Gene III DISCHARGE CAREGIVER [3] Son [22] [phone] [facility] DISCHARGE CAREGIVER [3] Brother [24]   [phone] Pritesh Torrez  Sister [23] [phone] Patient Belongings The following personal items are in your possession at time of discharge: 
  Dental Appliances: None             Jewelry: None  Clothing: Undergarments, Pants, Shirt, Footwear    Other Valuables: None Please provide this summary of care documentation to your next provider. Signatures-by signing, you are acknowledging that this After Visit Summary has been reviewed with you and you have received a copy. Patient Signature:  ____________________________________________________________ Date:  ____________________________________________________________  
  
Denise Moulding Provider Signature:  ____________________________________________________________ Date:  ____________________________________________________________

## 2017-12-05 NOTE — INTERVAL H&P NOTE
H&P Update:  Dave PURCELL Servando Armenta was seen and examined. History and physical has been reviewed. The patient has been examined.  There have been no significant clinical changes since the completion of the originally dated History and Physical.    Signed By: Rasheed Neville MD    2 December 5, 2017 10:19 AM

## 2017-12-05 NOTE — ANESTHESIA POSTPROCEDURE EVALUATION
Post-Anesthesia Evaluation and Assessment    Patient: Dave Al Jr. MRN: [id number]  SSN: [id number]    YOB: 1955  Age: 60 y.o. Sex: male       Cardiovascular Function/Vital Signs  Visit Vitals    /68 (BP 1 Location: Right arm, BP Patient Position: At rest;Head of bed elevated (Comment degrees))    Pulse 72    Temp 37.1 °C (98.7 °F)    Resp 21    Ht 6' (1.829 m)    Wt 86.6 kg (191 lb)    SpO2 98%    BMI 25.9 kg/m2       Patient is status post general anesthesia for Procedure(s):  RIGHT INGUINAL HERNIA REPAIR WITH MESH. Nausea/Vomiting: None    Postoperative hydration reviewed and adequate. Pain:  Pain Scale 1: Numeric (0 - 10) (12/05/17 0807)  Pain Intensity 1: 0 (12/05/17 0807)   Managed    Neurological Status:   Neuro (WDL): Within Defined Limits (12/05/17 0809)   At baseline    Mental Status and Level of Consciousness: Arousable    Pulmonary Status:   O2 Device: Nasal cannula (12/05/17 1227)   Adequate oxygenation and airway patent    Complications related to anesthesia: None    Post-anesthesia assessment completed.  No concerns    Signed By: Richard Magaña MD     December 5, 2017

## 2017-12-05 NOTE — ANESTHESIA PREPROCEDURE EVALUATION
Anesthetic History   No history of anesthetic complications            Review of Systems / Medical History  Patient summary reviewed and pertinent labs reviewed    Pulmonary    COPD: mild               Neuro/Psych   Within defined limits           Cardiovascular    Hypertension (Denies. On no meds)          Past MI and cardiac stents (2 stents. Denies angina)    Exercise tolerance: >4 METS     GI/Hepatic/Renal  Within defined limits              Endo/Other        Arthritis and cancer (H/O Non Hodgkins Lymphoma-treated)     Other Findings   Comments:   Risk Factors for Postoperative nausea/vomiting:       History of postoperative nausea/vomiting? NO       Female? NO       Motion sickness? NO       Intended opioid administration for postoperative analgesia? YES      Smoking Abstinence  Current Smoker? NO  Elective Surgery? YES  Seen preoperatively by anesthesiologist or proxy prior to day of surgery? YES  Pt abstained from smoking 24 hours prior to anesthesia?  N/A           Physical Exam    Airway  Mallampati: II  TM Distance: 4 - 6 cm  Neck ROM: normal range of motion   Mouth opening: Normal     Cardiovascular  Regular rate and rhythm,  S1 and S2 normal,  no murmur, click, rub, or gallop             Dental  No notable dental hx       Pulmonary  Breath sounds clear to auscultation               Abdominal  GI exam deferred       Other Findings            Anesthetic Plan    ASA: 2  Anesthesia type: general          Induction: Intravenous  Anesthetic plan and risks discussed with: Patient

## 2017-12-05 NOTE — BRIEF OP NOTE
BRIEF OPERATIVE NOTE    Date of Procedure: 12/5/2017   Preoperative Diagnosis: Inguinal hernia, right [K40.90]  Postoperative Diagnosis: Inguinal hernia, right [K40.90]    Procedure(s):  RIGHT INGUINAL HERNIA REPAIR WITH MESH  Surgeon(s) and Role:     * Anisha Martin MD - Primary         Assistant Staff:       Surgical Staff:  Circ-1: Otto Landaverde RN  Circ-Relief: Lindsay Marcus  Scrub Tech-1: Leopoldo Mejia  Surg Asst-1: Ritu Doe  Event Time In   Incision Start 1056   Incision Close      Anesthesia: General   Estimated Blood Loss: 0  Specimens: * No specimens in log *   Findings: very large indirect hernia  Complications: 0  Implants:   Implant Name Type Inv. Item Serial No.  Lot No. LRB No. Used Action   MESH SHRUTHI PLG XL 4.1X5CM --  - [id number]  MESH SHRUTHI PLG XL 4.1X5CM --   BARD MUNOZ [id number] Right 1 Implanted   MESH SHRUTHI PLG LG 1.6X1. 9IN --  - [id number]   MESH SHRUTHI PLG LG 1.6X1. [place]alkimberly MUNOZ [id number] Right 1 Implanted

## 2017-12-06 NOTE — OP NOTES
[address]    Name:  Lo Robb  MR#:  [id number]  :  1955  Account #:  [de-identified]  Date of Adm:  2017  Date of Surgery:  2017      PREOPERATIVE DIAGNOSIS:  Right inguinal hernia. POSTOPERATIVE DIAGNOSIS:  Right inguinal hernia. PROCEDURES PERFORMED:  Right inguinal herniorrhaphy with plug  and patch. SURGEON:  Darya Bland MD.    ASSISTANT:  .    ESTIMATED BLOOD LOSS:  Minimal.    SPECIMENS REMOVED:  None. COMPLICATIONS:  None. ANESTHESIA:  General.    INDICATIONS:  The patient is a 50-year-old gentleman with a  symptomatic right inguinal hernia for repair. DESCRIPTION OF PROCEDURE:  After appropriate antibiotics and  sequential compression stockings, the patient was taken to the  operating room and placed in supine position on the OR table. After  adequate general anesthesia, his abdomen and groin were prepped  and draped to [facility] standards. A standard right inguinal herniorrhaphy  incision was created and Bovie electrocautery was used to take down  the subcutaneous tissue to the external oblique aponeurosis. The  aponeurosis was opened sharply with a knife and the Metzenbaum  scissors, exposing the inguinal canal. Spermatic cord was bluntly  dissected and surrounded with a Penrose drain. A very large indirect  hernia was noted and it was dissected from the cord using sharp and  blunt dissection. Because of its broad base, it was then reduced  without difficulty as there was no neck to his sac. An extra large and  large plug were placed in his internal ring, and a patch was used to re-  create the floor of the inguinal canal. The patch was sewn to the pubic  tubercle, the transversalis fascia, and the shelving portion of inguinal  ligament. Laterally, it was wrapped around the cord and closed with the  same suture. The ends of the patch were tucked underneath the  external oblique aponeurosis.  Copious irrigation was carried out,  suctioned free, and the cord was released. Hemostasis appeared  good. The external oblique aponeurosis was closed with 3-0 Vicryl. Then 3-0 Vicryl was used on the subcutaneous tissue and 4-0  Monocryl used for the skin after irrigation. He tolerated the procedure  well. Marcaine 0.25% was used around his wound. He was taken to  recovery in stable condition.         MD Doreen Rowell / Sydney Rush  D:  12/05/2017   13:52  T:  12/05/2017   22:52  Job #:  [id number]

## 2017-12-10 ENCOUNTER — APPOINTMENT (OUTPATIENT)
Dept: GENERAL RADIOLOGY | Age: 62
End: 2017-12-10
Attending: EMERGENCY MEDICINE
Payer: MEDICARE

## 2017-12-10 ENCOUNTER — HOSPITAL ENCOUNTER (EMERGENCY)
Age: 62
Discharge: HOME | End: 2017-12-10
Attending: EMERGENCY MEDICINE
Payer: MEDICARE

## 2017-12-10 ENCOUNTER — APPOINTMENT (OUTPATIENT)
Dept: CT IMAGING | Age: 62
End: 2017-12-10
Attending: EMERGENCY MEDICINE
Payer: MEDICARE

## 2017-12-10 VITALS
HEART RATE: 60 BPM | SYSTOLIC BLOOD PRESSURE: 130 MMHG | HEIGHT: 77 IN | RESPIRATION RATE: 17 BRPM | OXYGEN SATURATION: 99 % | DIASTOLIC BLOOD PRESSURE: 82 MMHG | WEIGHT: 192 LBS | BODY MASS INDEX: 22.67 KG/M2

## 2017-12-10 DIAGNOSIS — J44.9 CHRONIC OBSTRUCTIVE PULMONARY DISEASE, UNSPECIFIED COPD TYPE (HCC): ICD-10-CM

## 2017-12-10 DIAGNOSIS — R06.6 HICCUPS: Primary | ICD-10-CM

## 2017-12-10 LAB
ANION GAP SERPL CALC-SCNC: 12 MMOL/L (ref 3–18)
APTT PPP: 33.3 SEC (ref 23–36.4)
ATRIAL RATE: 49 BPM
BASOPHILS # BLD: 0.1 K/UL (ref 0–0.06)
BASOPHILS NFR BLD: 1 % (ref 0–2)
BNP SERPL-MCNC: 283 PG/ML (ref 0–900)
BUN SERPL-MCNC: 8 MG/DL (ref 7–18)
BUN/CREAT SERPL: 8 (ref 12–20)
CALCIUM SERPL-MCNC: 9.5 MG/DL (ref 8.5–10.1)
CALCULATED P AXIS, ECG09: 29 DEGREES
CALCULATED R AXIS, ECG10: 32 DEGREES
CALCULATED T AXIS, ECG11: 40 DEGREES
CHLORIDE SERPL-SCNC: 101 MMOL/L (ref 100–108)
CK MB CFR SERPL CALC: NORMAL % (ref 0–4)
CK MB SERPL-MCNC: <1 NG/ML (ref 5–25)
CK SERPL-CCNC: 65 U/L (ref 39–308)
CO2 SERPL-SCNC: 25 MMOL/L (ref 21–32)
CREAT SERPL-MCNC: 0.99 MG/DL (ref 0.6–1.3)
D DIMER PPP FEU-MCNC: 8.18 UG/ML(FEU)
DIAGNOSIS, 93000: NORMAL
DIFFERENTIAL METHOD BLD: ABNORMAL
EOSINOPHIL # BLD: 0.3 K/UL (ref 0–0.4)
EOSINOPHIL NFR BLD: 4 % (ref 0–5)
ERYTHROCYTE [DISTWIDTH] IN BLOOD BY AUTOMATED COUNT: 14.4 % (ref 11.6–14.5)
GLUCOSE SERPL-MCNC: 90 MG/DL (ref 74–99)
HCT VFR BLD AUTO: 43.8 % (ref 36–48)
HGB BLD-MCNC: 13.9 G/DL (ref 13–16)
INR PPP: 1.1 (ref 0.8–1.2)
LYMPHOCYTES # BLD: 1.9 K/UL (ref 0.9–3.6)
LYMPHOCYTES NFR BLD: 26 % (ref 21–52)
MCH RBC QN AUTO: 26.3 PG (ref 24–34)
MCHC RBC AUTO-ENTMCNC: 31.7 G/DL (ref 31–37)
MCV RBC AUTO: 82.8 FL (ref 74–97)
MONOCYTES # BLD: 0.6 K/UL (ref 0.05–1.2)
MONOCYTES NFR BLD: 8 % (ref 3–10)
NEUTS SEG # BLD: 4.4 K/UL (ref 1.8–8)
NEUTS SEG NFR BLD: 61 % (ref 40–73)
P-R INTERVAL, ECG05: 162 MS
PLATELET # BLD AUTO: 251 K/UL (ref 135–420)
PMV BLD AUTO: 10 FL (ref 9.2–11.8)
POTASSIUM SERPL-SCNC: 3.8 MMOL/L (ref 3.5–5.5)
PROTHROMBIN TIME: 13.2 SEC (ref 11.5–15.2)
Q-T INTERVAL, ECG07: 454 MS
QRS DURATION, ECG06: 82 MS
QTC CALCULATION (BEZET), ECG08: 410 MS
RBC # BLD AUTO: 5.29 M/UL (ref 4.7–5.5)
SODIUM SERPL-SCNC: 138 MMOL/L (ref 136–145)
TROPONIN I SERPL-MCNC: <0.02 NG/ML (ref 0–0.06)
VENTRICULAR RATE, ECG03: 49 BPM
WBC # BLD AUTO: 7.2 K/UL (ref 4.6–13.2)

## 2017-12-10 PROCEDURE — 93005 ELECTROCARDIOGRAM TRACING: CPT

## 2017-12-10 PROCEDURE — 74011250637 HC RX REV CODE- 250/637: Performed by: EMERGENCY MEDICINE

## 2017-12-10 PROCEDURE — 85730 THROMBOPLASTIN TIME PARTIAL: CPT | Performed by: EMERGENCY MEDICINE

## 2017-12-10 PROCEDURE — 85025 COMPLETE CBC W/AUTO DIFF WBC: CPT | Performed by: EMERGENCY MEDICINE

## 2017-12-10 PROCEDURE — 85610 PROTHROMBIN TIME: CPT | Performed by: EMERGENCY MEDICINE

## 2017-12-10 PROCEDURE — 99284 EMERGENCY DEPT VISIT MOD MDM: CPT

## 2017-12-10 PROCEDURE — 74011636320 HC RX REV CODE- 636/320: Performed by: EMERGENCY MEDICINE

## 2017-12-10 PROCEDURE — 74011250636 HC RX REV CODE- 250/636: Performed by: EMERGENCY MEDICINE

## 2017-12-10 PROCEDURE — 96372 THER/PROPH/DIAG INJ SC/IM: CPT

## 2017-12-10 PROCEDURE — 77030029684 HC NEB SM VOL KT MONA -A

## 2017-12-10 PROCEDURE — 74011000250 HC RX REV CODE- 250: Performed by: EMERGENCY MEDICINE

## 2017-12-10 PROCEDURE — 71275 CT ANGIOGRAPHY CHEST: CPT

## 2017-12-10 PROCEDURE — 85379 FIBRIN DEGRADATION QUANT: CPT | Performed by: EMERGENCY MEDICINE

## 2017-12-10 PROCEDURE — 83880 ASSAY OF NATRIURETIC PEPTIDE: CPT | Performed by: EMERGENCY MEDICINE

## 2017-12-10 PROCEDURE — 82550 ASSAY OF CK (CPK): CPT | Performed by: EMERGENCY MEDICINE

## 2017-12-10 PROCEDURE — 94640 AIRWAY INHALATION TREATMENT: CPT

## 2017-12-10 PROCEDURE — 80048 BASIC METABOLIC PNL TOTAL CA: CPT | Performed by: EMERGENCY MEDICINE

## 2017-12-10 PROCEDURE — 71010 XR CHEST PORT: CPT

## 2017-12-10 RX ORDER — CHLORPROMAZINE HYDROCHLORIDE 25 MG/ML
50 INJECTION INTRAMUSCULAR ONCE
Status: COMPLETED | OUTPATIENT
Start: 2017-12-10 | End: 2017-12-10

## 2017-12-10 RX ORDER — CYCLOBENZAPRINE HCL 10 MG
10 TABLET ORAL
Status: COMPLETED | OUTPATIENT
Start: 2017-12-10 | End: 2017-12-10

## 2017-12-10 RX ORDER — CHLORPROMAZINE HYDROCHLORIDE 50 MG/1
50 TABLET, FILM COATED ORAL
Status: DISCONTINUED | OUTPATIENT
Start: 2017-12-10 | End: 2017-12-10

## 2017-12-10 RX ORDER — IPRATROPIUM BROMIDE AND ALBUTEROL SULFATE 2.5; .5 MG/3ML; MG/3ML
3 SOLUTION RESPIRATORY (INHALATION)
Status: COMPLETED | OUTPATIENT
Start: 2017-12-10 | End: 2017-12-10

## 2017-12-10 RX ORDER — CHLORPROMAZINE HYDROCHLORIDE 25 MG/1
50 TABLET, FILM COATED ORAL
Qty: 12 TAB | Refills: 0 | Status: ON HOLD | OUTPATIENT
Start: 2017-12-10 | End: 2018-07-05

## 2017-12-10 RX ORDER — ALBUTEROL SULFATE 90 UG/1
2 AEROSOL, METERED RESPIRATORY (INHALATION)
Qty: 1 INHALER | Refills: 0 | Status: SHIPPED | OUTPATIENT
Start: 2017-12-10

## 2017-12-10 RX ADMIN — IPRATROPIUM BROMIDE AND ALBUTEROL SULFATE 3 ML: .5; 3 SOLUTION RESPIRATORY (INHALATION) at 09:58

## 2017-12-10 RX ADMIN — CHLORPROMAZINE HYDROCHLORIDE 50 MG: 25 INJECTION INTRAMUSCULAR at 10:52

## 2017-12-10 RX ADMIN — IOPAMIDOL 75 ML: 755 INJECTION, SOLUTION INTRAVENOUS at 11:55

## 2017-12-10 RX ADMIN — CYCLOBENZAPRINE HYDROCHLORIDE 10 MG: 10 TABLET, FILM COATED ORAL at 13:30

## 2017-12-10 NOTE — ED NOTES
Responded to pt's room after hearing loud noises coming from his room. Pt. Stated that he has had hiccups for the last 2 days. Pt. Had inguinal hernia repair on Tuesday. Pt appears to be breathing better after treatment.

## 2017-12-10 NOTE — ED PROVIDER NOTES
EMERGENCY DEPARTMENT HISTORY AND PHYSICAL EXAM    10:24 AM      Date: 12/10/2017  Patient Name: Guido Ramos. History of Presenting Illness     Chief Complaint   Patient presents with    Shortness of Breath    Nausea    Abdominal Pressure         History Provided By: Patient    Chief Complaint: Shortness of Breath; Nausea   Duration: 1 Day  Timing:  Constant  Location: N/A  Quality: N/A  Severity: N/A  Modifying Factors: None - ran out of nebulizer and inhaler at home   Associated Symptoms: Dizziness; hiccups       Additional History (Context): Dave Polk. is a 60 y.o. male with PMHx of COPD, CHF, and HTN presenting to the ED c/o constant shortness of breath and nausea that started yesterday. Pt also reports dizziness. Pt also c/o hiccups for the past 2 days. States he feels constipated but last BM was yesterday. Notes he used to use a nebulizer and inhaler at home but states he ran out. Pt had inguinal hernia repair 5 days ago. Denies chest pain and any other symptoms or complaints. PCP: Agata Ludn MD    Current Facility-Administered Medications   Medication Dose Route Frequency Provider Last Rate Last Dose    cyclobenzaprine (FLEXERIL) tablet 10 mg  10 mg Oral NOW [facility],          Current Outpatient Prescriptions   Medication Sig Dispense Refill    albuterol (PROVENTIL HFA, VENTOLIN HFA, PROAIR HFA) 90 mcg/actuation inhaler Take 2 Puffs by inhalation every four (4) hours as needed for Wheezing or Shortness of Breath. Indications: BRONCHOSPASM PREVENTION, Chronic Obstructive Pulmonary Disease 1 Inhaler 0    chlorproMAZINE (THORAZINE) 25 mg tablet Take 2 Tabs by mouth three (3) times daily as needed. Indications: INTRACTABLE HICCUPS 12 Tab 0    oxyCODONE-acetaminophen (PERCOCET) 5-325 mg per tablet Take 1 Tab by mouth every six (6) hours as needed for Pain. Max Daily Amount: 4 Tabs. 30 Tab 0    atorvastatin (LIPITOR) 20 mg tablet Take  by mouth daily.       ibuprofen (MOTRIN) 600 mg tablet Take 1 Tab by mouth every six (6) hours as needed for Pain. 30 Tab 0    cholecalciferol, VITAMIN D3, (VITAMIN D3) 5,000 unit tab tablet Take 5,000 Units by mouth daily.  aspirin (ASPIRIN) 325 mg tablet Take 325 mg by mouth daily.  esomeprazole (NEXIUM) 20 mg capsule Take 20 mg by mouth daily. Past History     Past Medical History:  Past Medical History:   Diagnosis Date    Arthritis     Cancer of bone ([address]     COPD     Heart failure (HCC)     stents x2 in 2008    Hypertension        Past Surgical History:  Past Surgical History:   Procedure Laterality Date    CARDIAC SURG PROCEDURE UNLIST      COLONOSCOPY N/A 8/15/2016    COLONOSCOPY with polypectomy, biopsy and clip performed by Kaylene Nathan [facility] at [facility]    HX [address]  6-5-13    HX HEART CATHETERIZATION  6-5-13    REPAIR ING HERNIA,5+Y/O,REDUCIBL Right 12/05/2017    Dr. Rigoberto Umaña       Family History:  Family History   Problem Relation Age of Onset    Stroke Mother     Heart Disease Father        Social History:  Social History   Substance Use Topics    Smoking status: Former Smoker     Packs/day: 2.00     Years: 39.00     Quit date: 8/10/2012    Smokeless tobacco: Never Used    Alcohol use No       Allergies:  No Known Allergies      Review of Systems     Review of Systems   Constitutional: Negative for fever. HENT: Negative for congestion and sore throat.         + Hiccups   Eyes: Negative for redness and visual disturbance. Respiratory: Positive for shortness of breath. Negative for wheezing. Cardiovascular: Negative for chest pain and palpitations. Gastrointestinal: Positive for nausea. Negative for abdominal distention, abdominal pain and vomiting. Endocrine: Negative for polyuria. Genitourinary: Negative for difficulty urinating and dysuria. Musculoskeletal: Negative for arthralgias and neck stiffness. Skin: Negative for rash.    Neurological: Positive for light-headedness. Negative for dizziness, syncope, weakness, numbness and headaches. Psychiatric/Behavioral: Negative. All other systems reviewed and are negative. Physical Exam     Visit Vitals    /82    Pulse 60    Resp 17    Ht 6' 5\" (1.956 m)    Wt 87.1 kg (192 lb)    SpO2 99%    BMI 22.77 kg/m2       Physical Exam   Constitutional: He is oriented to person, place, and time. He appears well-developed and well-nourished. No distress. Speaking in full sentences. HENT:   Head: Normocephalic and atraumatic. Mouth/Throat: Oropharynx is clear and moist.   hiccups   Eyes: Conjunctivae are normal. Pupils are equal, round, and reactive to light. No scleral icterus. Neck: Normal range of motion. Neck supple. Cardiovascular: Intact distal pulses. Bradycardia present. Capillary refill < 3 seconds   Pulmonary/Chest: Effort normal and breath sounds normal. No stridor. Tachypnea noted. No respiratory distress. No wheezes but lung sounds are tight. Abdominal: Soft. Bowel sounds are normal. He exhibits no distension. There is no tenderness. Right groin incision site from hernia repair is clean with no signs of infection. Has steri strips and bioclusive in place. Musculoskeletal: Normal range of motion. He exhibits no edema. No edema of lower extremities. No calf tenderness. Lymphadenopathy:     He has no cervical adenopathy. Neurological: He is alert and oriented to person, place, and time. No cranial nerve deficit. No facial droop. Sensation intact. Strength 5/5. Skin: Skin is warm and dry. He is not diaphoretic. Psychiatric: Thought content normal. His speech is not slurred. Nursing note and vitals reviewed.         Diagnostic Study Results     Labs -  Recent Results (from the past 12 hour(s))   EKG, 12 LEAD, INITIAL    Collection Time: 12/10/17  9:57 AM   Result Value Ref Range    Ventricular Rate 49 BPM    Atrial Rate 49 BPM    P-R Interval 162 ms    QRS Duration 82 ms    Q-T Interval 454 ms    QTC Calculation (Bezet) 410 ms    Calculated P Axis 29 degrees    Calculated R Axis 32 degrees    Calculated T Axis 40 degrees    Diagnosis       Marked sinus bradycardia  Abnormal ECG  When compared with ECG of 21-NOV-2017 10:22,  Nonspecific T wave abnormality now evident in Inferior leads     CBC WITH AUTOMATED DIFF    Collection Time: 12/10/17 10:00 AM   Result Value Ref Range    WBC 7.2 4.6 - 13.2 K/uL    RBC 5.29 4.70 - 5.50 M/uL    HGB 13.9 13.0 - 16.0 g/dL    HCT 43.8 36.0 - 48.0 %    MCV 82.8 74.0 - 97.0 FL    MCH 26.3 24.0 - 34.0 PG    MCHC 31.7 31.0 - 37.0 g/dL    RDW 14.4 11.6 - 14.5 %    PLATELET 720 455 - 938 K/uL    MPV 10.0 9.2 - 11.8 FL    NEUTROPHILS 61 40 - 73 %    LYMPHOCYTES 26 21 - 52 %    MONOCYTES 8 3 - 10 %    EOSINOPHILS 4 0 - 5 %    BASOPHILS 1 0 - 2 %    ABS. NEUTROPHILS 4.4 1.8 - 8.0 K/UL    ABS. LYMPHOCYTES 1.9 0.9 - 3.6 K/UL    ABS. MONOCYTES 0.6 0.05 - 1.2 K/UL    ABS. EOSINOPHILS 0.3 0.0 - 0.4 K/UL    ABS.  BASOPHILS 0.1 (H) 0.0 - 0.06 K/UL    DF AUTOMATED     METABOLIC PANEL, BASIC    Collection Time: 12/10/17 10:00 AM   Result Value Ref Range    Sodium 138 136 - 145 mmol/L    Potassium 3.8 3.5 - 5.5 mmol/L    Chloride 101 100 - 108 mmol/L    CO2 25 21 - 32 mmol/L    Anion gap 12 3.0 - 18 mmol/L    Glucose 90 74 - 99 mg/dL    BUN 8 7.0 - 18 MG/DL    Creatinine 0.99 0.6 - 1.3 MG/DL    BUN/Creatinine ratio 8 (L) 12 - 20      GFR est AA >60 >60 ml/min/1.73m2    GFR est non-AA >60 >60 ml/min/1.73m2    Calcium 9.5 8.5 - 10.1 MG/DL   CARDIAC PANEL,(CK, CKMB & TROPONIN)    Collection Time: 12/10/17 10:00 AM   Result Value Ref Range    CK 65 39 - 308 U/L    CK - MB <1.0 <3.6 ng/ml    CK-MB Index  0.0 - 4.0 %     CALCULATION NOT PERFORMED WHEN RESULT IS BELOW LINEAR LIMIT    Troponin-I, Qt. <0.02 0.00 - 0.06 NG/ML   NT-PRO BNP    Collection Time: 12/10/17 10:00 AM   Result Value Ref Range    NT pro- 0 - 900 PG/ML   D DIMER    Collection Time: 12/10/17 10:00 AM   Result Value Ref Range    D DIMER 8.18 (H) <0.46 ug/ml(FEU)   PROTHROMBIN TIME + INR    Collection Time: 12/10/17 10:00 AM   Result Value Ref Range    Prothrombin time 13.2 11.5 - 15.2 sec    INR 1.1 0.8 - 1.2     PTT    Collection Time: 12/10/17 10:00 AM   Result Value Ref Range    aPTT 33.3 23.0 - 36.4 SEC       Radiologic Studies -   XR CHEST PORT   Final Result   IMPRESSION:      No acute process. CTA CHEST W OR WO CONT   Final Result  IMPRESSION:     1. No convincing CT evidence of pulmonary embolism.     2. Moderate COPD. No acute pulmonary finding. Medical Decision Making   I am the first provider for this patient. I reviewed the vital signs, available nursing notes, past medical history, past surgical history, family history and social history. Vital Signs-Reviewed the patient's vital signs. Pulse Oximetry Analysis -  100% on room air, normal    Cardiac Monitor:  Rate: 57  Rhythm:  Sinus bradycardia    EKG: Interpreted by the EP. Time Interpreted:    Rate: 49   Rhythm: sinus bradycardia   Interpretation: normal QRS duration, no SOLO, no ST depression, some artifact       Records Reviewed: Old Medical Records (Time of Review: 10:24 AM)    Provider Notes (Medical Decision Making):  MDM  Number of Diagnoses or Management Options  Chronic obstructive pulmonary disease, unspecified COPD type ([facility] [address]):   Hiccups:   Diagnosis management comments: DDx COPD exacerbation, infectious, cardiac, anxiety, PE,  Neoplasm, metabolic, hiccups. Will check labs, chest X-ray, and EKG. Will give duo-neb and dose of thorazine. Pt is having hiccups at bedside.      WBC wnl  ddimer 8    Get CTA chest to evaluate for PE    Labs reassuring    CxR and CTA nothing acute               Amount and/or Complexity of Data Reviewed  Clinical lab tests: ordered and reviewed  Tests in the radiology section of CPT®: ordered and reviewed  Tests in the medicine section of CPT®: ordered and reviewed  Review and summarize past medical records: yes  Independent visualization of images, tracings, or specimens: yes    Risk of Complications, Morbidity, and/or Mortality  Presenting problems: moderate  Diagnostic procedures: moderate  Management options: moderate    Patient Progress  Patient progress: improved        ED Course: Progress Notes, Reevaluation, and Consults:  Reassessed and pt HR 65 on monitor as im at bedside. O2 97%RA    12:47 PM Patient states Thorazine helped his hiccups but they have now retured. Will give him dose of Flexeril prior to discharge. Will give prescription for chlorpromazine and refill his albuterol inhaler and have him follow up with his PCP. I have reassessed the patient. I have discussed the workup, results and plan with the patient and patient is in agreement. Patient is feeling better. Patient will be prescribed chlorpromazine, albuterol inhaler. Patient was discharge in stable condition. Patient was given outpatient follow up. Diagnosis     Clinical Impression:   1. Hiccups    2. Chronic obstructive pulmonary disease, unspecified COPD type ([facility] [address]        Disposition: Discharged     Follow-up Information     Follow up With Details Comments Contact Info    Yanet Pitts MD Call in 2 days For PCP follow up  [address]  [phone]      [facility] EMERGENCY DEPT  As needed, If symptoms worsen [address] [phone]           Patient's Medications   Start Taking    ALBUTEROL (PROVENTIL HFA, VENTOLIN HFA, PROAIR HFA) 90 MCG/ACTUATION INHALER    Take 2 Puffs by inhalation every four (4) hours as needed for Wheezing or Shortness of Breath. Indications: BRONCHOSPASM PREVENTION, Chronic Obstructive Pulmonary Disease    CHLORPROMAZINE (THORAZINE) 25 MG TABLET    Take 2 Tabs by mouth three (3) times daily as needed.  Indications: INTRACTABLE HICCUPS   Continue Taking    ASPIRIN (ASPIRIN) 325 MG TABLET    Take 325 mg by mouth daily.    ATORVASTATIN (LIPITOR) 20 MG TABLET    Take  by mouth daily. CHOLECALCIFEROL, VITAMIN D3, (VITAMIN D3) 5,000 UNIT TAB TABLET    Take 5,000 Units by mouth daily. ESOMEPRAZOLE (NEXIUM) 20 MG CAPSULE    Take 20 mg by mouth daily. IBUPROFEN (MOTRIN) 600 MG TABLET    Take 1 Tab by mouth every six (6) hours as needed for Pain. OXYCODONE-ACETAMINOPHEN (PERCOCET) 5-325 MG PER TABLET    Take 1 Tab by mouth every six (6) hours as needed for Pain. Max Daily Amount: 4 Tabs. These Medications have changed    No medications on file   Stop Taking    OXYCODONE-ACETAMINOPHEN (PERCOCET) 5-325 MG PER TABLET    Take 1 to 2 tablet every 6 hours as needed for pain control. If you were instructed to try over the counter ibuprofen or tylenol, only take the percocet for pain not controlled with the over the counter medication. _______________________________    Attestations:  Scribe Attestation     Neftali Humphries acting as a scribe for and in the presence of Patricia Stack DO      December 10, 2017 at 12:58 PM       Provider Attestation:      I personally performed the services described in the documentation, reviewed the documentation, as recorded by the scribe in my presence, and it accurately and completely records my words and actions.  December 10, 2017 at 12:58 PM - Patricia Stack DO    _______________________________

## 2017-12-10 NOTE — DISCHARGE INSTRUCTIONS
Chronic Obstructive Pulmonary Disease (COPD): Care Instructions  Your Care Instructions    Chronic obstructive pulmonary disease (COPD) is a general term for a group of lung diseases, including emphysema and chronic bronchitis. People with COPD have decreased airflow in and out of the lungs, which makes it hard to breathe. The airways also can get clogged with thick mucus. Cigarette smoking is a major cause of COPD. Although there is no cure for COPD, you can slow its progress. Following your treatment plan and taking care of yourself can help you feel better and live longer. Follow-up care is a key part of your treatment and safety. Be sure to make and go to all appointments, and call your doctor if you are having problems. It's also a good idea to know your test results and keep a list of the medicines you take. How can you care for yourself at home? ?Staying healthy  ? · Do not smoke. This is the most important step you can take to prevent more damage to your lungs. If you need help quitting, talk to your doctor about stop-smoking programs and medicines. These can increase your chances of quitting for good. ? · Avoid colds and flu. Get a pneumococcal vaccine shot. If you have had one before, ask your doctor whether you need a second dose. Get the flu vaccine every fall. If you must be around people with colds or the flu, wash your hands often. ? · Avoid secondhand smoke, air pollution, and high altitudes. Also avoid cold, dry air and hot, humid air. Stay at home with your windows closed when air pollution is bad. ?Medicines and oxygen therapy  ? · Take your medicines exactly as prescribed. Call your doctor if you think you are having a problem with your medicine. ? · You may be taking medicines such as:  ¨ Bronchodilators. These help open your airways and make breathing easier. Bronchodilators are either short-acting (work for 6 to 9 hours) or long-acting (work for 24 hours).  You inhale most bronchodilators, so they start to act quickly. Always carry your quick-relief inhaler with you in case you need it while you are away from home. ¨ Corticosteroids (prednisone, budesonide). These reduce airway inflammation. They come in pill or inhaled form. You must take these medicines every day for them to work well. ? · A spacer may help you get more inhaled medicine to your lungs. Ask your doctor or pharmacist if a spacer is right for you. If it is, ask how to use it properly. ? · Do not take any vitamins, over-the-counter medicine, or herbal products without talking to your doctor first.   ? · If your doctor prescribed antibiotics, take them as directed. Do not stop taking them just because you feel better. You need to take the full course of antibiotics. ? · Oxygen therapy boosts the amount of oxygen in your blood and helps you breathe easier. Use the flow rate your doctor has recommended, and do not change it without talking to your doctor first.   Activity  ? · Get regular exercise. Walking is an easy way to get exercise. Start out slowly, and walk a little more each day. ? · Pay attention to your breathing. You are exercising too hard if you cannot talk while you are exercising. ? · Take short rest breaks when doing household chores and other activities. ? · Learn breathing methods-such as breathing through pursed lips-to help you become less short of breath. ? · If your doctor has not set you up with a pulmonary rehabilitation program, talk to him or her about whether rehab is right for you. Rehab includes exercise programs, education about your disease and how to manage it, help with diet and other changes, and emotional support. Diet  ? · Eat regular, healthy meals. Use bronchodilators about 1 hour before you eat to make it easier to eat. Eat several small meals instead of three large ones. Drink beverages at the end of the meal. Avoid foods that are hard to chew.    ? · Eat foods that contain protein so that you do not lose muscle mass. ? · Talk with your doctor if you gain too much weight or if you lose weight without trying. ?Mental health  ? · Talk to your family, friends, or a therapist about your feelings. It is normal to feel frightened, angry, hopeless, helpless, and even guilty. Talking openly about bad feelings can help you cope. If these feelings last, talk to your doctor. When should you call for help? Call 911 anytime you think you may need emergency care. For example, call if:  ? · You have severe trouble breathing. ?Call your doctor now or seek immediate medical care if:  ? · You have new or worse trouble breathing. ? · You cough up blood. ? · You have a fever. ? Watch closely for changes in your health, and be sure to contact your doctor if:  ? · You cough more deeply or more often, especially if you notice more mucus or a change in the color of your mucus. ? · You have new or worse swelling in your legs or belly. ? · You are not getting better as expected. Where can you learn more? Go to http://messi-thang.info/. John Gonzalez in the search box to learn more about \"Chronic Obstructive Pulmonary Disease (COPD): Care Instructions. \"  Current as of: May 12, 2017  Content Version: 11.4  © 7387-5918 [employer]. Care instructions adapted under license by [employer] (which disclaims liability or warranty for this information). If you have       Hiccups: Care Instructions  Your Care Instructions    Hiccups occur when a spasm contracts the diaphragm, a large sheet of muscle that separates the chest cavity from the abdominal cavity. The spasm causes an intake of breath that is suddenly stopped by the closure of the vocal cords (glottis). This closure causes the \"hiccup\" sound. A very full stomach can cause hiccups that go away on their own.  A full stomach can be caused by things like eating too much food too quickly or swallowing too much air. Most hiccups go away on their own within a few minutes to a few hours and do not require any treatment. Hiccups that last longer than 48 hours are called persistent hiccups. Hiccups that last longer than a month are called intractable hiccups. Both persistent and intractable hiccups may be a sign of a more serious health problem. Follow-up care is a key part of your treatment and safety. Be sure to make and go to all appointments, and call your doctor if you are having problems. It's also a good idea to know your test results and keep a list of the medicines you take. How can you care for yourself at home? · Try these safe and easy home remedies if your hiccups are making you uncomfortable. ¨ Eat a teaspoon of sugar or honey. ¨ Hold your breath and count slowly to 10. ¨ Quickly drink a glass of cold water. · If your doctor prescribed medicine, take it as directed. Call your doctor if you think you are having a problem with your medicine. To help prevent hiccups  · Take steps to avoid swallowing air:  ¨ Eat slowly. Avoid gulping food or beverages. ¨ Chew your food thoroughly before you swallow. ¨ Avoid drinking through a straw. ¨ Avoid chewing gum or eating hard candy. ¨ Do not smoke or use other tobacco products. ¨ If you wear dentures, check with a dentist to make sure they fit properly. · Do not eat large meals. · Do not drink alcohol. · Avoid sudden changes in stomach temperature, such as drinking a hot beverage and then a cold beverage. · Avoid emotional stress or excitement. When should you call for help? Call your doctor now or seek immediate medical care if:  ? · You have trouble swallowing and are unable to swallow food or fluids. ? · You have hiccups for more than 2 days. ? · You have new symptoms, such as belly pain, constipation, diarrhea, heartburn, or vomiting. ? Watch closely for changes in your health, and be sure to contact your doctor if:  ? · You have trouble swallowing but are able to swallow food and fluids. ? · Hiccups occur often and get in the way of your activities. ? · You think medicine may be causing your symptoms. ? · You do not get better as expected. Where can you learn more? Go to http://messi-thang.info/. Enter Q937 in the search box to learn more about \"Hiccups: Care Instructions. \"  Current as of: March 20, 2017  Content Version: 11.4  © 1397-9444 [employer]. Care instructions adapted under license by [employer] (which disclaims liability or warranty for this information). If you have questions about a medical condition or this instruction, always ask your healthcare professional. [address] any warranty or liability for your use of this information. questions about a medical condition or this instruction, always ask your healthcare professional. [address] any warranty or liability for your use of this information.

## 2017-12-10 NOTE — ED TRIAGE NOTES
Pt presents to the ED with SOB and nausea onset yesterday. Pt reports hx of COPD. Pt states \"I feel constipated, but I had a BM yesterday. \" Pt states dizziness. Pt reports recent right inguinal repair. Pt states hx of x2 cardiac stents    Pt.  States he ran out of his inhaler

## 2017-12-10 NOTE — ED NOTES
Pt's right groin surgical site appears clean and dry. Surgical dressing in place. Some bruising noted around site.  No bleeding or redness noted

## 2017-12-20 ENCOUNTER — OFFICE VISIT (OUTPATIENT)
Dept: SURGERY | Age: 62
End: 2017-12-20

## 2017-12-20 VITALS — RESPIRATION RATE: 18 BRPM | DIASTOLIC BLOOD PRESSURE: 70 MMHG | SYSTOLIC BLOOD PRESSURE: 130 MMHG

## 2017-12-20 DIAGNOSIS — K40.90 RIGHT INGUINAL HERNIA: Primary | ICD-10-CM

## 2017-12-20 NOTE — PROGRESS NOTES
Progress Note    Patient: Dave Al Jr. MRN: [id number]  SSN: [id number]   YOB: 1955  Age: 60 y.o. Sex: male     Chief Complaint   Patient presents with    Post OP Follow Up     hernia repair       HPI    Mr Dipti Austin returns after his right inguinal herniorrhaphy with a plug and patch. He looks great and is,  while still sore approaching baseline. His wound looks good he is tolerating a regular diet and moving his bowels appropriately. We will see him back in a couple weeks for one final check    Past Medical History:   Diagnosis Date    Arthritis     Cancer of bone ([facility] [address]     COPD     Heart failure ([facility] [address]     stents x2 in 2008    Hypertension      Past Surgical History:   Procedure Laterality Date    CARDIAC SURG PROCEDURE UNLIST      COLONOSCOPY N/A 8/15/2016    COLONOSCOPY with polypectomy, biopsy and clip performed by Jarrett Farooq MD at [facility] ENDOSCOPY    HX [address]  6-5-13    HX HEART CATHETERIZATION  6-5-13    REPAIR ING HERNIA,5+Y/O,REDUCIBL Right 12/05/2017    Dr. Kiera Morris     No Known Allergies  Current Outpatient Prescriptions   Medication Sig Dispense Refill    albuterol (PROVENTIL HFA, VENTOLIN HFA, PROAIR HFA) 90 mcg/actuation inhaler Take 2 Puffs by inhalation every four (4) hours as needed for Wheezing or Shortness of Breath. Indications: BRONCHOSPASM PREVENTION, Chronic Obstructive Pulmonary Disease 1 Inhaler 0    chlorproMAZINE (THORAZINE) 25 mg tablet Take 2 Tabs by mouth three (3) times daily as needed. Indications: INTRACTABLE HICCUPS 12 Tab 0    atorvastatin (LIPITOR) 20 mg tablet Take  by mouth daily.  ibuprofen (MOTRIN) 600 mg tablet Take 1 Tab by mouth every six (6) hours as needed for Pain. 30 Tab 0    cholecalciferol, VITAMIN D3, (VITAMIN D3) 5,000 unit tab tablet Take 5,000 Units by mouth daily.  aspirin (ASPIRIN) 325 mg tablet Take 325 mg by mouth daily.  esomeprazole (NEXIUM) 20 mg capsule Take 20 mg by mouth daily.  oxyCODONE-acetaminophen (PERCOCET) 5-325 mg per tablet Take 1 Tab by mouth every six (6) hours as needed for Pain. Max Daily Amount: 4 Tabs. 30 Tab 0     Social History     Social History    Marital status: SINGLE     Spouse name: N/A    Number of children: N/A    Years of education: N/A     Occupational History    Not on file. Social History Main Topics    Smoking status: Former Smoker     Packs/day: 2.00     Years: 39.00     Quit date: 8/10/2012    Smokeless tobacco: Never Used    Alcohol use No    Drug use: No    Sexual activity: No     Other Topics Concern    Not on file     Social History Narrative     Family History   Problem Relation Age of Onset    Stroke Mother     Heart Disease Father          Review of systems:  Patient denies any reflux, emesis, abdominal pain, change in bowel habits, hematochezia, melena, fever, weight loss, fatigue chills, dermatitis, abnormal moles, change in vision, vertigo, epistaxis, dysphagia, hoarseness, chest pain, palpitations, hypertension, edema, cough, shortness of breath, wheezing, hemoptysis, snoring, hematuria, diabetes, thyroid disease, anemia, bruising, history of blood transfusion, dizziness, headache, or fainting.     Physical Examination    Well developed well nourished male in no apparent distress  Visit Vitals    /70    Resp 18      Head: normocephalic, atraumatic  Mouth: Clear, no overt lesions, oral mucosa pink and moist  Neck: supple, no masses, no adenopathy or carotid bruits, trachea midline  Resp: clear to auscultation bilaterally, no wheeze, rhonchi or rales, excursions normal and symmetrical  Cardio: Regular rate and rhythm, no murmurs, clicks, gallops or rubs, no edema or varicosities  Abdomen: soft, nontender, nondistended, normoactive bowel sounds, no hernias, no hepatosplenomegaly, healing groin wound  Back: Deferred  Extremeties: warm, well-perfused, no tenderness or swelling, normal gait/station  Neuro: sensation and strength grossly intact and symmetrical  Psych: alert and oriented to person, place and time  Breast exam deferred    IMPRESSION  Status post right inguinal herniorrhaphy with plug and patch doing well and approaching baseline    PLAN  No orders of the defined types were placed in this encounter.     Follow up 2 weeks  Kerry Montanez MD

## 2017-12-20 NOTE — MR AVS SNAPSHOT
Visit Information Date & Time Provider Department Dept. Phone Encounter #  
 12/20/2017 11:30 AM Trinity Almaraz MD [facility] [phone] [id number] Your Appointments 1/31/2018  8:00 AM  
PROCEDURE with [facility] IMAGING 2 [facility] ([address] [place] [address]) Appt Note: DUPLEX AORTA / scott; r/s to new office; l/m for pt with appt date, time & location; pt r/s  
 [facility] [place] [place] [address]  
[phone] [address] [place] [address]  
  
    
 1/31/2018  9:00 AM  
PROCEDURE with [facility] NONIMAGING [facility] ([address] [place] [address]) Appt Note: LOWER EXT ART /scott; .; l/m for pt with appt date, time & location; pt r/s  
 [address] [place] [address] [address]  
[phone] [address] [place] [address],[address] [facility]  
  
    
 2/7/2018  1:45 PM  
Follow Up with Cinda Swan MD  
[facility] [address] [place] [address]) Appt Note: 1 year with study; .; pt r/s  
 [facility] [place] [place] [address]  
[phone] [address] [facility] [address] Upcoming Health Maintenance Date Due Hepatitis C Screening 1955 Pneumococcal 19-64 Highest Risk (1 of 3 - PCV13) 1/25/1973 DTaP/Tdap/Td series (1 - Tdap) 1/25/1975 FOBT Q 1 YEAR AGE 50-75 1/25/2004 ZOSTER VACCINE AGE 60> 11/25/2013 Influenza Age 5 to Adult 8/1/2017 Allergies as of 12/20/2017  Review Complete On: 12/20/2017 By: Gabriela Flores LPN No Known Allergies Current Immunizations  Reviewed on 2/5/2014 No immunizations on file. Not reviewed this visit Vitals BP Resp Smoking Status 130/70 18 Former Smoker Preferred Pharmacy Pharmacy Name Phone  [facility] [phone] [facility] [address] [phone] Your Updated Medication List  
  
   
This list is accurate as of: 12/20/17 11:40 AM.  Always use your most recent med list.  
  
  
  
  
 albuterol 90 mcg/actuation inhaler Commonly known as:  PROVENTIL HFA, VENTOLIN HFA, PROAIR HFA Take 2 Puffs by inhalation every four (4) hours as needed for Wheezing or Shortness of Breath. Indications: BRONCHOSPASM PREVENTION, Chronic Obstructive Pulmonary Disease  
  
 aspirin 325 mg tablet Commonly known as:  ASPIRIN Take 325 mg by mouth daily. chlorproMAZINE 25 mg tablet Commonly known as:  THORAZINE Take 2 Tabs by mouth three (3) times daily as needed. Indications: INTRACTABLE HICCUPS  
  
 cholecalciferol (VITAMIN D3) 5,000 unit Tab tablet Commonly known as:  VITAMIN D3 Take 5,000 Units by mouth daily. ibuprofen 600 mg tablet Commonly known as:  MOTRIN Take 1 Tab by mouth every six (6) hours as needed for Pain. LIPITOR 20 mg tablet Generic drug:  atorvastatin Take  by mouth daily. NexIUM 20 mg capsule Generic drug:  esomeprazole Take 20 mg by mouth daily. oxyCODONE-acetaminophen 5-325 mg per tablet Commonly known as:  PERCOCET Take 1 Tab by mouth every six (6) hours as needed for Pain. Max Daily Amount: 4 Tabs. Please provide this summary of care documentation to your next provider. Your primary care clinician is listed as Rakesh Jackson. If you have any questions after today's visit, please call [phone].

## 2018-01-03 ENCOUNTER — OFFICE VISIT (OUTPATIENT)
Dept: SURGERY | Age: 63
End: 2018-01-03

## 2018-01-03 VITALS — RESPIRATION RATE: 18 BRPM | SYSTOLIC BLOOD PRESSURE: 120 MMHG | DIASTOLIC BLOOD PRESSURE: 70 MMHG

## 2018-01-03 DIAGNOSIS — K40.90 RIGHT INGUINAL HERNIA: Primary | ICD-10-CM

## 2018-01-03 NOTE — PROGRESS NOTES
Progress Note    Patient: Dave Al Jr. MRN: [id number]  SSN: [id number]   YOB: 1955  Age: 60 y.o. Sex: male     Chief Complaint   Patient presents with    Post OP Follow Up     hernia repair       HPI    Mr. Juan Carlos Villegas is status post a right inguinal herniorrhaphy with plug and patch several weeks ago. He looks great his wounds healed nicely and he is essentially back to baseline. We will see him as needed from now on. Past Medical History:   Diagnosis Date    Arthritis     Cancer of bone ([facility] [address]     COPD     Heart failure ([facility] [address]     stents x2 in 2008    Hypertension      Past Surgical History:   Procedure Laterality Date    CARDIAC SURG PROCEDURE UNLIST      COLONOSCOPY N/A 8/15/2016    COLONOSCOPY with polypectomy, biopsy and clip performed by Annie [facility]raf MD at [facility] ENDOSCOPY    HX CORONARY STENT PLACEMENT  6-5-13    HX HEART CATHETERIZATION  6-5-13    REPAIR ING HERNIA,5+Y/O,REDUCIBL Right 12/05/2017    Dr. Vijay Sen     No Known Allergies  Current Outpatient Prescriptions   Medication Sig Dispense Refill    albuterol (PROVENTIL HFA, VENTOLIN HFA, PROAIR HFA) 90 mcg/actuation inhaler Take 2 Puffs by inhalation every four (4) hours as needed for Wheezing or Shortness of Breath. Indications: BRONCHOSPASM PREVENTION, Chronic Obstructive Pulmonary Disease 1 Inhaler 0    chlorproMAZINE (THORAZINE) 25 mg tablet Take 2 Tabs by mouth three (3) times daily as needed. Indications: INTRACTABLE HICCUPS 12 Tab 0    atorvastatin (LIPITOR) 20 mg tablet Take  by mouth daily.  ibuprofen (MOTRIN) 600 mg tablet Take 1 Tab by mouth every six (6) hours as needed for Pain. 30 Tab 0    cholecalciferol, VITAMIN D3, (VITAMIN D3) 5,000 unit tab tablet Take 5,000 Units by mouth daily.  aspirin (ASPIRIN) 325 mg tablet Take 325 mg by mouth daily.  esomeprazole (NEXIUM) 20 mg capsule Take 20 mg by mouth daily.       oxyCODONE-acetaminophen (PERCOCET) 5-325 mg per tablet Take 1 Tab by mouth every six (6) hours as needed for Pain. Max Daily Amount: 4 Tabs. 30 Tab 0     Social History     Social History    Marital status: SINGLE     Spouse name: N/A    Number of children: N/A    Years of education: N/A     Occupational History    Not on file. Social History Main Topics    Smoking status: Former Smoker     Packs/day: 2.00     Years: 39.00     Quit date: 8/10/2012    Smokeless tobacco: Never Used    Alcohol use No    Drug use: No    Sexual activity: No     Other Topics Concern    Not on file     Social History Narrative     Family History   Problem Relation Age of Onset    Stroke Mother     Heart Disease Father          Review of systems:  Patient denies any reflux, emesis, abdominal pain, change in bowel habits, hematochezia, melena, fever, weight loss, fatigue chills, dermatitis, abnormal moles, change in vision, vertigo, epistaxis, dysphagia, hoarseness, chest pain, palpitations, hypertension, edema, cough, shortness of breath, wheezing, hemoptysis, snoring, hematuria, diabetes, thyroid disease, anemia, bruising, history of blood transfusion, dizziness, headache, or fainting.     Physical Examination    Well developed well nourished male in no apparent distress  Visit Vitals    /70    Resp 18      Head: normocephalic, atraumatic  Mouth: Clear, no overt lesions, oral mucosa pink and moist  Neck: supple, no masses, no adenopathy or carotid bruits, trachea midline  Resp: clear to auscultation bilaterally, no wheeze, rhonchi or rales, excursions normal and symmetrical  Cardio: Regular rate and rhythm, no murmurs, clicks, gallops or rubs, no edema or varicosities  Abdomen: soft, nontender, nondistended, normoactive bowel sounds, no hernias, no hepatosplenomegaly, healing right groin wound  Back: Deferred  Extremeties: warm, well-perfused, no tenderness or swelling, normal gait/station  Neuro: sensation and strength grossly intact and symmetrical  Psych: alert and oriented to person, place and time  Breast exam deferred    IMPRESSION  Status post right inguinal herniorrhaphy doing well and back to baseline    PLAN  No orders of the defined types were placed in this encounter.     Follow-up as needed  Marita Calzada MD

## 2018-01-31 ENCOUNTER — OFFICE VISIT (OUTPATIENT)
Dept: VASCULAR SURGERY | Age: 63
End: 2018-01-31

## 2018-01-31 DIAGNOSIS — I70.211 ATHEROSCLEROSIS OF NATIVE ARTERY OF RIGHT LOWER EXTREMITY WITH INTERMITTENT CLAUDICATION (HCC): ICD-10-CM

## 2018-01-31 DIAGNOSIS — I73.9 PAD (PERIPHERAL ARTERY DISEASE) (HCC): ICD-10-CM

## 2018-01-31 DIAGNOSIS — I72.3 ILIAC ARTERY ANEURYSM, LEFT (HCC): ICD-10-CM

## 2018-01-31 DIAGNOSIS — I77.811 AORTIC ECTASIA, ABDOMINAL (HCC): ICD-10-CM

## 2018-01-31 NOTE — PROCEDURES
[facility]   *** FINAL REPORT ***    Name: Farnaz Friday  MRN: [id number]       Outpatient  : 1955  HIS Order #: [id number]  [address] [facility] Visit #: [id number]  Date: 2018    TYPE OF TEST: Aorto-Iliac Duplex    REASON FOR TEST  Suspected aortic aneurysm, Suspected iliac aneurysm    B-Mode:-                 (cm)   1     2     3  Aortic diameter:         AP:     1.7   1.8   1.5                           TV:     1.8   1.9   1.8  Common iliac diameter:   Right: 0.60                           Left:  1.00    Duplex:-                           PSV  Stenosis                           ----- --------------------  Aorta: (1)                49.0 Normal         (2)                50.0         (3)                58.0    Right common iliac:       67.0 Normal  Right external iliac:     82.0 Normal    Left common iliac:        66.0 Normal  Left external iliac:      79.0 Normal    INTERPRETATION/FINDINGS  Duplex images were obtained using 2-D gray scale, color flow and  spectral doppler analysis. 1. No evidence of abdominal aortic aneurysm or significant stenosis. 2. There is mild plaquing in the abdominal aorta. 3. Patent common iliac arteries bilaterally with multiphasic flow and  dilatation on the left with measurements listed below. 4. Patent proximal external iliac arteries with multiphasic flow. 5. Patent celiac, superior mesenteric, inferior mesenteric and renal  arteries at the origins. ADDITIONAL COMMENTS  Celiac 212c/s. Sma 103c/s. RRA 101c/s. LRA 60c/s. Rt MAIA prox 0.50 x  0.52, mid 0.69 x 0.80, dst 0.45 x 0.55. Lt MAIA prox 1.05 x 1.07, mid  0.66 x 0.84, dst 0.98 x 1.20 cm. I have personally reviewed the data relevant to the interpretation of  this  study. TECHNOLOGIST: Ruthie Terrell RVT, RDMS  Signed: 2018 09:38 AM    PHYSICIAN: Sadie Castellanos.  Maribeth Guajardo MD  Signed: 2018 11:58 AM

## 2018-01-31 NOTE — PROCEDURES
[facility]   *** FINAL REPORT ***    Name: Kash Barfield  MRN: [id number]       Outpatient  : 1955  HIS Order #: [id number]  [address] [facility] Visit #: [id number]  Date: 2018    TYPE OF TEST: Peripheral Arterial Testing    REASON FOR TEST  Peripheral vascular dz NOS    Right Leg  Segmentals: Abnormal                     mmHg  Brachial         134  High thigh  Low thigh  Calf             112  Posterior tibial  97  Dorsalis pedis    84  Peroneal  Metatarsal  Toe pressure      82  Doppler:    Abnormal  Ankle/Brachial: 0.70    Site of occlusive disease:-  femoral, popliteal and tibioperoneal segments    Left Leg  Segmentals: Abnormal                     mmHg  Brachial         139  High thigh  Low thigh  Calf              80  Posterior tibial  90  Dorsalis pedis    91  Peroneal  Metatarsal  Toe pressure      51  Doppler:    Abnormal  Ankle/Brachial: 0.65    Site of occlusive disease:-  femoral, popliteal and tibioperoneal segments    INTERPRETATION/FINDINGS  Physiologic testing was performed using continuous wave doppler and  segmental pressures. 1. Moderate peripheral arterial disease at rest in both legs with  superficial femoral artery disease and multi-level involvement. 2. The right ankle/brachia lindex is 0.70 and the left ankle/brachial  index is 0.65. 3. The right digit/brachial index is 0.59 and the left digit/brachial  index is 0.37.  4. Compared to the previous study on 16 there is no significant  change. ADDITIONAL COMMENTS    I have personally reviewed the data relevant to the interpretation of  this  study. TECHNOLOGIST: Debyb Vogt RVT, RDMS  Signed: 2018 09:23 AM    PHYSICIAN: Kt Bales.  Ilda Vásquez MD  Signed: 2018 11:55 AM

## 2018-02-07 ENCOUNTER — OFFICE VISIT (OUTPATIENT)
Dept: VASCULAR SURGERY | Age: 63
End: 2018-02-07

## 2018-02-07 VITALS
SYSTOLIC BLOOD PRESSURE: 140 MMHG | HEIGHT: 77 IN | WEIGHT: 192 LBS | RESPIRATION RATE: 20 BRPM | HEART RATE: 62 BPM | BODY MASS INDEX: 22.67 KG/M2 | DIASTOLIC BLOOD PRESSURE: 82 MMHG

## 2018-02-07 DIAGNOSIS — I70.211 ATHEROSCLEROSIS OF NATIVE ARTERY OF RIGHT LOWER EXTREMITY WITH INTERMITTENT CLAUDICATION (HCC): Primary | ICD-10-CM

## 2018-02-07 DIAGNOSIS — I73.9 PAD (PERIPHERAL ARTERY DISEASE) (HCC): ICD-10-CM

## 2018-02-07 DIAGNOSIS — I72.3 ILIAC ARTERY ANEURYSM, LEFT (HCC): ICD-10-CM

## 2018-02-07 RX ORDER — PREDNISONE 5 MG/1
TABLET ORAL DAILY
COMMUNITY

## 2018-02-07 NOTE — PROGRESS NOTES
Dave PURCELL Servando Tolbert. Chief Complaint   Patient presents with    Leg Pain     PAD, Follow up after studys       History and Physical    Dave Jackson. is a 58 y.o. male with claudication of bilateral lower extremities currently he is unable to determine which leg is worse than the other. Seems that his claudication has worsened since last time I saw him he was able to go around 200-300 yards before stopping now is back to 50 yards before stopping. Is unclear as to whether or not this is lifestyle limiting at this current time no rest pain or ulcerations. He does need a bone biopsy but is uncertain as to when this will be getting scheduled. No fevers or chills. Past Medical History:   Diagnosis Date    Arthritis     Cancer of bone ([facility] [address]     COPD     Heart failure (HCC)     stents x2 in 2008    Hypertension      Past Surgical History:   Procedure Laterality Date    CARDIAC SURG PROCEDURE UNLIST      COLONOSCOPY N/A 8/15/2016    COLONOSCOPY with polypectomy, biopsy and clip performed by Yoshi Diaz MD at [address]   6-5-13    HX HEART CATHETERIZATION  6-5-13    REPAIR ING HERNIA,5+Y/O,REDUCIBL Right 12/05/2017    Dr. Marina Garcia     Patient Active Problem List   Diagnosis Code    Sepsis(995.91) A41.9    Immunocompromised state ([address] D84.9    Anemia, unspecified D64.9    Bone cancer ([address] C41.9    PAD (peripheral artery disease) ([address] I73.9    Atherosclerosis of native artery of right lower extremity with intermittent claudication ([address] I70.211    Aortic ectasia, abdominal ([address] I77.811    Iliac artery aneurysm, left (HCC) I72.3    Right inguinal hernia K40.90     Current Outpatient Prescriptions   Medication Sig Dispense Refill    predniSONE (DELTASONE) 5 mg tablet Take  by mouth daily.  Take 1 to 3 tabs daily      albuterol (PROVENTIL HFA, VENTOLIN HFA, PROAIR HFA) 90 mcg/actuation inhaler Take 2 Puffs by inhalation every four (4) hours as needed for Wheezing or Shortness of Breath. Indications: BRONCHOSPASM PREVENTION, Chronic Obstructive Pulmonary Disease 1 Inhaler 0    atorvastatin (LIPITOR) 20 mg tablet Take  by mouth daily.  cholecalciferol, VITAMIN D3, (VITAMIN D3) 5,000 unit tab tablet Take 5,000 Units by mouth daily.  aspirin (ASPIRIN) 325 mg tablet Take 325 mg by mouth daily.  esomeprazole (NEXIUM) 20 mg capsule Take 20 mg by mouth daily.  chlorproMAZINE (THORAZINE) 25 mg tablet Take 2 Tabs by mouth three (3) times daily as needed. Indications: INTRACTABLE HICCUPS 12 Tab 0    oxyCODONE-acetaminophen (PERCOCET) 5-325 mg per tablet Take 1 Tab by mouth every six (6) hours as needed for Pain. Max Daily Amount: 4 Tabs. 30 Tab 0    ibuprofen (MOTRIN) 600 mg tablet Take 1 Tab by mouth every six (6) hours as needed for Pain. 30 Tab 0     No Known Allergies  Social History     Social History    Marital status:      Spouse name: N/A    Number of children: N/A    Years of education: N/A     Occupational History    Not on file.      Social History Main Topics    Smoking status: Former Smoker     Packs/day: 2.00     Years: 39.00     Quit date: 8/10/2012    Smokeless tobacco: Never Used    Alcohol use No    Drug use: No    Sexual activity: No     Other Topics Concern    Not on file     Social History Narrative      Family History   Problem Relation Age of Onset    Stroke Mother     Heart Disease Father        Physical Exam:    Visit Vitals    /82 (BP 1 Location: Left arm, BP Patient Position: Sitting)    Pulse 62    Resp 20    Ht 6' 5\" (1.956 m)    Wt 192 lb (87.1 kg)    BMI 22.77 kg/m2      General: Well-appearing male in no acute distress  HEENT: EOMI no scleral icterus is noted  Pulmonary: No increased work of breathing is noted  Extremities: Warm and perfused bilaterally  Neuro: Cranial nerves II through XII grossly intact    Impression and Plan:  Dong Clifford is a 58 y.o. male with peripheral arterial disease and claudication. I will have the patient come back in 3 months with repeat ultrasounds hopefully by that time his already has bone biopsy as he would not be able to get that after our repair as we would have to require him to stay on aspirin and Plavix. Depending on how he is walking and feeling will depend on whether or not he needs further care. Otherwise we can continue to follow him yearly with abdominal ultrasounds given his aortic ectasia and small aneurysm of his left common iliac artery although his recent ultrasounds does seem to show that but he has CT scans that have shown that. We reviewed the plan with the patient and the patient understands. We also gave the patient appropriate instructions on their disease process and when to call back. Follow-up Disposition:  Return in about 3 months (around 5/7/2018). Oswaldo Feldman MD    PLEASE NOTE:  This document has been produced using voice recognition software. Unrecognized errors in transcription may be present.

## 2018-02-07 NOTE — MR AVS SNAPSHOT
[address] [place], [place] [address] [facility] 
[phone] Patient: Dave Al Jr. MRN: [id number] Visit Information Date & Time Provider Department Dept. Phone Encounter #  
 2/7/2018  1:45 PM [facility] [id number] Follow-up Instructions Return in about 3 months (around 5/7/2018). Follow-up and Disposition History Your Appointments 5/8/2018 11:00 AM  
PROCEDURE with [facility] NONEPI [facility] ([facility]) Appt Note: LINO TERRY [address] [place], [place] [address] 200 [facility]  
[phone] 2300 [facility] 47 [facility]  
  
    
 5/21/2018  1:30 PM  
Follow Up with Jonatan Marte MD  
600 [facility]) Appt Note: 3 month follow up after study 2300 [facility] 398 200 [facility]  
[phone] 2300 [facility] 200 [facility] Upcoming Health Maintenance Date Due Hepatitis C Screening 1955 Pneumococcal 19-64 Highest Risk (1 of 3 - PCV13) 1/25/1973 DTaP/Tdap/Td series (1 - Tdap) 1/25/1975 FOBT Q 1 YEAR AGE 50-75 1/25/2004 ZOSTER VACCINE AGE 60> 11/25/2013 Influenza Age 5 to Adult 8/1/2017 Allergies as of 2/7/2018  Review Complete On: 2/7/2018 By: Jonatan Marte MD  
 No Known Allergies Current Immunizations  Reviewed on 2/5/2014 No immunizations on file. Not reviewed this visit You Were Diagnosed With   
  
 Codes Comments Atherosclerosis of native artery of right lower extremity with intermittent claudication ([facility] [place] [address]    -  Primary ICD-10-CM: B40.464 ICD-9-CM: 440.21 PAD (peripheral artery disease) (HCC)     ICD-10-CM: I73.9 ICD-9-CM: 443.9 Iliac artery aneurysm, left (HCC)     ICD-10-CM: I72.3 ICD-9-CM: 589. 2 Vitals BP Pulse Resp Height(growth percentile) Weight(growth percentile) BMI  
 140/82 (BP 1 Location: Left arm, BP Patient Position: Sitting) 62 20 6' 5\" (1.956 m) 192 lb (87.1 kg) 22.77 kg/m2 Smoking Status Former Smoker Vitals History BMI and BSA Data Body Mass Index Body Surface Area  
 22.77 kg/m 2 2.18 m 2 Preferred Pharmacy Pharmacy Name Phone [facility] [address], [address] [facility] [phone] Your Updated Medication List  
  
   
This list is accurate as of: 2/7/18  2:19 PM.  Always use your most recent med list.  
  
  
  
  
 albuterol 90 mcg/actuation inhaler Commonly known as:  PROVENTIL HFA, VENTOLIN HFA, PROAIR HFA Take 2 Puffs by inhalation every four (4) hours as needed for Wheezing or Shortness of Breath. Indications: BRONCHOSPASM PREVENTION, Chronic Obstructive Pulmonary Disease  
  
 aspirin 325 mg tablet Commonly known as:  ASPIRIN Take 325 mg by mouth daily. chlorproMAZINE 25 mg tablet Commonly known as:  THORAZINE Take 2 Tabs by mouth three (3) times daily as needed. Indications: INTRACTABLE HICCUPS  
  
 cholecalciferol (VITAMIN D3) 5,000 unit Tab tablet Commonly known as:  VITAMIN D3 Take 5,000 Units by mouth daily. ibuprofen 600 mg tablet Commonly known as:  MOTRIN Take 1 Tab by mouth every six (6) hours as needed for Pain. LIPITOR 20 mg tablet Generic drug:  atorvastatin Take  by mouth daily. NexIUM 20 mg capsule Generic drug:  esomeprazole Take 20 mg by mouth daily. oxyCODONE-acetaminophen 5-325 mg per tablet Commonly known as:  PERCOCET Take 1 Tab by mouth every six (6) hours as needed for Pain. Max Daily Amount: 4 Tabs. predniSONE 5 mg tablet Commonly known as:  Edu Fraise Take  by mouth daily. Take 1 to 3 tabs daily Follow-up Instructions Return in about 3 months (around 5/7/2018). To-Do List   
 05/07/2018 Imaging:  LOWER EXT ART PVR MULT LEVEL SEG PRESSURES AMB Please provide this summary of care documentation to your next provider. Your primary care clinician is listed as [address] If you have any questions after today's visit, please call [phone].

## 2018-02-23 ENCOUNTER — HOSPITAL ENCOUNTER (OUTPATIENT)
Dept: INTERVENTIONAL RADIOLOGY/VASCULAR | Age: 63
Discharge: HOME | End: 2018-02-23
Attending: INTERNAL MEDICINE | Admitting: RADIOLOGY
Payer: MEDICARE

## 2018-02-23 VITALS
SYSTOLIC BLOOD PRESSURE: 115 MMHG | OXYGEN SATURATION: 92 % | TEMPERATURE: 97.6 F | BODY MASS INDEX: 25.31 KG/M2 | DIASTOLIC BLOOD PRESSURE: 71 MMHG | RESPIRATION RATE: 19 BRPM | HEIGHT: 73 IN | HEART RATE: 64 BPM | WEIGHT: 191 LBS

## 2018-02-23 DIAGNOSIS — C83.38 DIFFUSE LARGE B-CELL LYMPHOMA OF LYMPH NODES OF MULTIPLE SITES (HCC): ICD-10-CM

## 2018-02-23 LAB
ANION GAP SERPL CALC-SCNC: 8 MMOL/L (ref 3–18)
APTT PPP: 27.5 SEC (ref 23–36.4)
BASOPHILS # BLD: 0 K/UL (ref 0–0.1)
BASOPHILS NFR BLD: 1 % (ref 0–2)
BUN SERPL-MCNC: 8 MG/DL (ref 7–18)
BUN/CREAT SERPL: 9 (ref 12–20)
CALCIUM SERPL-MCNC: 9.1 MG/DL (ref 8.5–10.1)
CHLORIDE SERPL-SCNC: 108 MMOL/L (ref 100–108)
CO2 SERPL-SCNC: 24 MMOL/L (ref 21–32)
CREAT SERPL-MCNC: 0.92 MG/DL (ref 0.6–1.3)
DIFFERENTIAL METHOD BLD: ABNORMAL
EOSINOPHIL # BLD: 0.2 K/UL (ref 0–0.4)
EOSINOPHIL NFR BLD: 3 % (ref 0–5)
ERYTHROCYTE [DISTWIDTH] IN BLOOD BY AUTOMATED COUNT: 14.6 % (ref 11.6–14.5)
GLUCOSE SERPL-MCNC: 106 MG/DL (ref 74–99)
HCT VFR BLD AUTO: 42.5 % (ref 36–48)
HGB BLD-MCNC: 13.5 G/DL (ref 13–16)
INR PPP: 1 (ref 0.8–1.2)
LYMPHOCYTES # BLD: 1.4 K/UL (ref 0.9–3.6)
LYMPHOCYTES NFR BLD: 24 % (ref 21–52)
MCH RBC QN AUTO: 25.8 PG (ref 24–34)
MCHC RBC AUTO-ENTMCNC: 31.8 G/DL (ref 31–37)
MCV RBC AUTO: 81.3 FL (ref 74–97)
MONOCYTES # BLD: 0.5 K/UL (ref 0.05–1.2)
MONOCYTES NFR BLD: 9 % (ref 3–10)
NEUTS SEG # BLD: 3.7 K/UL (ref 1.8–8)
NEUTS SEG NFR BLD: 63 % (ref 40–73)
PLATELET # BLD AUTO: 206 K/UL (ref 135–420)
PMV BLD AUTO: 9.6 FL (ref 9.2–11.8)
POTASSIUM SERPL-SCNC: 4.1 MMOL/L (ref 3.5–5.5)
PROTHROMBIN TIME: 13.1 SEC (ref 11.5–15.2)
RBC # BLD AUTO: 5.23 M/UL (ref 4.7–5.5)
SODIUM SERPL-SCNC: 140 MMOL/L (ref 136–145)
WBC # BLD AUTO: 5.8 K/UL (ref 4.6–13.2)

## 2018-02-23 PROCEDURE — 88184 FLOWCYTOMETRY/ TC 1 MARKER: CPT | Performed by: RADIOLOGY

## 2018-02-23 PROCEDURE — 88311 DECALCIFY TISSUE: CPT | Performed by: RADIOLOGY

## 2018-02-23 PROCEDURE — 88305 TISSUE EXAM BY PATHOLOGIST: CPT | Performed by: RADIOLOGY

## 2018-02-23 PROCEDURE — 77030028872 HC BN BIOP NDL ON CNTRL TY TELE -C

## 2018-02-23 PROCEDURE — 38221 DX BONE MARROW BIOPSIES: CPT

## 2018-02-23 PROCEDURE — 77030003666 HC NDL SPINAL BD -A

## 2018-02-23 PROCEDURE — 85025 COMPLETE CBC W/AUTO DIFF WBC: CPT | Performed by: RADIOLOGY

## 2018-02-23 PROCEDURE — 77030022017 HC DRSG HEMO QCLOT ZMED -A

## 2018-02-23 PROCEDURE — 85730 THROMBOPLASTIN TIME PARTIAL: CPT | Performed by: RADIOLOGY

## 2018-02-23 PROCEDURE — 80048 BASIC METABOLIC PNL TOTAL CA: CPT | Performed by: RADIOLOGY

## 2018-02-23 PROCEDURE — 88264 CHROMOSOME ANALYSIS 20-25: CPT | Performed by: RADIOLOGY

## 2018-02-23 PROCEDURE — 85610 PROTHROMBIN TIME: CPT | Performed by: RADIOLOGY

## 2018-02-23 PROCEDURE — 88313 SPECIAL STAINS GROUP 2: CPT | Performed by: RADIOLOGY

## 2018-02-23 PROCEDURE — 74011250636 HC RX REV CODE- 250/636: Performed by: RADIOLOGY

## 2018-02-23 PROCEDURE — 74011000250 HC RX REV CODE- 250: Performed by: RADIOLOGY

## 2018-02-23 PROCEDURE — 88185 FLOWCYTOMETRY/TC ADD-ON: CPT | Performed by: RADIOLOGY

## 2018-02-23 PROCEDURE — 88237 TISSUE CULTURE BONE MARROW: CPT | Performed by: RADIOLOGY

## 2018-02-23 RX ORDER — NALOXONE HYDROCHLORIDE 0.4 MG/ML
0.1 INJECTION, SOLUTION INTRAMUSCULAR; INTRAVENOUS; SUBCUTANEOUS
Status: DISCONTINUED | OUTPATIENT
Start: 2018-02-23 | End: 2018-02-23 | Stop reason: HOSPADM

## 2018-02-23 RX ORDER — SODIUM CHLORIDE 0.9 % (FLUSH) 0.9 %
5-10 SYRINGE (ML) INJECTION EVERY 8 HOURS
Status: DISCONTINUED | OUTPATIENT
Start: 2018-02-23 | End: 2018-02-23 | Stop reason: HOSPADM

## 2018-02-23 RX ORDER — SODIUM CHLORIDE 0.9 % (FLUSH) 0.9 %
5-10 SYRINGE (ML) INJECTION AS NEEDED
Status: DISCONTINUED | OUTPATIENT
Start: 2018-02-23 | End: 2018-02-23 | Stop reason: HOSPADM

## 2018-02-23 RX ORDER — MIDAZOLAM HYDROCHLORIDE 1 MG/ML
1 INJECTION, SOLUTION INTRAMUSCULAR; INTRAVENOUS
Status: DISCONTINUED | OUTPATIENT
Start: 2018-02-23 | End: 2018-02-23

## 2018-02-23 RX ORDER — LIDOCAINE HYDROCHLORIDE 10 MG/ML
30 INJECTION, SOLUTION EPIDURAL; INFILTRATION; INTRACAUDAL; PERINEURAL ONCE
Status: COMPLETED | OUTPATIENT
Start: 2018-02-23 | End: 2018-02-23

## 2018-02-23 RX ORDER — SODIUM CHLORIDE 9 MG/ML
20 INJECTION, SOLUTION INTRAVENOUS CONTINUOUS
Status: DISCONTINUED | OUTPATIENT
Start: 2018-02-23 | End: 2018-02-23 | Stop reason: HOSPADM

## 2018-02-23 RX ORDER — FLUMAZENIL 0.1 MG/ML
0.2 INJECTION INTRAVENOUS
Status: DISCONTINUED | OUTPATIENT
Start: 2018-02-23 | End: 2018-02-23 | Stop reason: HOSPADM

## 2018-02-23 RX ORDER — FENTANYL CITRATE 50 UG/ML
50 INJECTION, SOLUTION INTRAMUSCULAR; INTRAVENOUS
Status: DISCONTINUED | OUTPATIENT
Start: 2018-02-23 | End: 2018-02-23

## 2018-02-23 RX ADMIN — MIDAZOLAM HYDROCHLORIDE 1 MG: 1 INJECTION, SOLUTION INTRAMUSCULAR; INTRAVENOUS at 09:07

## 2018-02-23 RX ADMIN — MIDAZOLAM HYDROCHLORIDE 1 MG: 1 INJECTION, SOLUTION INTRAMUSCULAR; INTRAVENOUS at 09:16

## 2018-02-23 RX ADMIN — LIDOCAINE HYDROCHLORIDE 30 ML: 10 INJECTION, SOLUTION EPIDURAL; INFILTRATION; INTRACAUDAL; PERINEURAL at 09:13

## 2018-02-23 RX ADMIN — FENTANYL CITRATE 50 MCG: 50 INJECTION INTRAMUSCULAR; INTRAVENOUS at 09:11

## 2018-02-23 RX ADMIN — FENTANYL CITRATE 50 MCG: 50 INJECTION INTRAMUSCULAR; INTRAVENOUS at 09:16

## 2018-02-23 RX ADMIN — MIDAZOLAM HYDROCHLORIDE 1 MG: 1 INJECTION, SOLUTION INTRAMUSCULAR; INTRAVENOUS at 09:11

## 2018-02-23 RX ADMIN — FENTANYL CITRATE 50 MCG: 50 INJECTION INTRAMUSCULAR; INTRAVENOUS at 09:07

## 2018-02-23 NOTE — PROGRESS NOTES
TRANSFER - OUT REPORT:    Verbal report given to RODNEY Busby(name) on Dave Al Jr.  being transferred to [facility](unit) for routine post - op       Report consisted of patients Situation, Background, Assessment and   Recommendations(SBAR). Information from the following report(s) SBAR, Kardex and MAR was reviewed with the receiving nurse. Lines:   Peripheral IV 02/23/18 Right Antecubital (Active)        Opportunity for questions and clarification was provided.       Patient transported with:   [id number]

## 2018-02-23 NOTE — ROUTINE PROCESS
Pt alert and oriented x4, biopsy site c/d/i. Tolerating PO, discharge instructions reviewed, verbalizes understanding. PIV removed, dsd applied. Escorted to son's car, discharged home.

## 2018-02-23 NOTE — PROCEDURES
RADIOLOGY POST PROCEDURE NOTE     February 23, 2018       11:08 AM     Preoperative Diagnosis:   Lymphoma. Postoperative Diagnosis:  Same. :  Dr. Hall Memory    Assistant:  None. Type of Anesthesia: 1% plain lidocaine and IV moderate sedation with Versed and Fentanyl    Procedure/Description:  Image guided bone marrow Bx. Findings:   No bleeding. Estimated blood Loss:  Minimal    Specimen Removed:   yes    Blood transfusions:  None. Implants:  None.     Complications: None    Condition: Stable    Discharge Plan:  discharge home     Gian Kaba MD

## 2018-02-23 NOTE — IP AVS SNAPSHOT
[address] Patient: Dave Al Jr. MRN: [id number]:[id number] A check gil indicates which time of day the medication should be taken. My Medications CONTINUE taking these medications Instructions Each Dose to Equal  
 Morning Noon Evening Bedtime  
 albuterol 90 mcg/actuation inhaler Commonly known as:  PROVENTIL HFA, VENTOLIN HFA, PROAIR HFA Your last dose was: Your next dose is: Take 2 Puffs by inhalation every four (4) hours as needed for Wheezing or Shortness of Breath. Indications: BRONCHOSPASM PREVENTION, Chronic Obstructive Pulmonary Disease 2 Puff  
    
   
   
   
  
 aspirin 325 mg tablet Commonly known as:  ASPIRIN Your last dose was: Your next dose is: Take 325 mg by mouth daily. 325 mg  
    
   
   
   
  
 chlorproMAZINE 25 mg tablet Commonly known as:  THORAZINE Your last dose was: Your next dose is: Take 2 Tabs by mouth three (3) times daily as needed. Indications: INTRACTABLE HICCUPS 50 mg  
    
   
   
   
  
 cholecalciferol (VITAMIN D3) 5,000 unit Tab tablet Commonly known as:  VITAMIN D3 Your last dose was: Your next dose is: Take 5,000 Units by mouth daily. 5000 Units  
    
   
   
   
  
 ibuprofen 600 mg tablet Commonly known as:  MOTRIN Your last dose was: Your next dose is: Take 1 Tab by mouth every six (6) hours as needed for Pain. 600 mg  
    
   
   
   
  
 LIPITOR 20 mg tablet Generic drug:  atorvastatin Your last dose was: Your next dose is: Take  by mouth daily. NexIUM 20 mg capsule Generic drug:  esomeprazole Your last dose was: Your next dose is: Take 20 mg by mouth daily.   
 20 mg  
    
   
   
   
  
 oxyCODONE-acetaminophen 5-325 mg per tablet Commonly known as:  PERCOCET Your last dose was: Your next dose is: Take 1 Tab by mouth every six (6) hours as needed for Pain. Max Daily Amount: 4 Tabs. 1 Tab  
    
   
   
   
  
 predniSONE 5 mg tablet Commonly known as:  Kia Whitt Your last dose was: Your next dose is: Take  by mouth daily. Take 1 to 3 tabs daily

## 2018-02-23 NOTE — H&P
OUTPATIENT HISTORY AND PHYSICAL      Today 2/23/2018     Indication/Symptoms:   Dave Al Jr. is a 58 y.o. male with diffuse large B-cell lymphoma who presents for an image-guided bone marrow biopsy with moderate sedation. Patient has been NPO since midnight and last took ASA 5 days ago. Current Meds:    Prior to Admission medications    Medication Sig Start Date End Date Taking? Authorizing Provider   predniSONE (DELTASONE) 5 mg tablet Take  by mouth daily. Take 1 to 3 tabs daily   Yes Historical Provider   albuterol (PROVENTIL HFA, VENTOLIN HFA, PROAIR HFA) 90 mcg/actuation inhaler Take 2 Puffs by inhalation every four (4) hours as needed for Wheezing or Shortness of Breath. Indications: BRONCHOSPASM PREVENTION, Chronic Obstructive Pulmonary Disease 12/10/17  Yes Prabha Winston, DO   chlorproMAZINE (THORAZINE) 25 mg tablet Take 2 Tabs by mouth three (3) times daily as needed. Indications: INTRACTABLE HICCUPS 12/10/17  Yes Prabha Winston, DO   cholecalciferol, VITAMIN D3, (VITAMIN D3) 5,000 unit tab tablet Take 5,000 Units by mouth daily. Yes Historical Provider   aspirin (ASPIRIN) 325 mg tablet Take 325 mg by mouth daily. Yes Akil Ramos MD   esomeprazole (NEXIUM) 20 mg capsule Take 20 mg by mouth daily. Yes Akil Ramos MD   oxyCODONE-acetaminophen (PERCOCET) 5-325 mg per tablet Take 1 Tab by mouth every six (6) hours as needed for Pain. Max Daily Amount: 4 Tabs. 12/5/17   Keren Kumari MD   atorvastatin (LIPITOR) 20 mg tablet Take  by mouth daily. Historical Provider   ibuprofen (MOTRIN) 600 mg tablet Take 1 Tab by mouth every six (6) hours as needed for Pain.  8/29/17   Reba Carlson MD       Allergies:    No Known Allergies    Comorbid Conditions:    Past Medical History:   Diagnosis Date    Arthritis     Cancer of bone ([facility] [address]     COPD     Heart failure ([facility] [address]     stents x2 in 2008    Hypertension           Past Surgical History:   Procedure Laterality Date    CARDIAC SURG PROCEDURE UNLIST      COLONOSCOPY N/A 8/15/2016    COLONOSCOPY with polypectomy, biopsy and clip performed by Roxie Holt MD [facility]    HX CORONARY STENT PLACEMENT  6-5-13    HX HEART CATHETERIZATION  6-5-13    REPAIR ING HERNIA,5+Y/O,REDUCIBL Right 12/05/2017    Dr. Oswald Serum:    Visit Vitals    /61    Pulse 72    Temp 97.6 °F (36.4 °C)    Resp 16    Ht 6' 1\" (1.854 m)    Wt 86.6 kg (191 lb)    SpO2 100%    BMI 25.2 kg/m2   :  Recent Labs      02/23/18   0746   PLT  206     No results for input(s): INR, APTT in the last 72 hours. No lab exists for component: PT, INREXT    The H & P and/or progress notes and any available imaging were reviewed. The risks, indications and possible alternatives to the procedure, including doing nothing, were discussed and informed consent was obtained. Physical Exam:      Mental status:   Alert and oriented. Examination specific to the procedure proposed to be performed and any co morbid conditions:   Mallampati classification 2 ,  ASA 2   Heart:   Regular rate. Lungs:   Normal respiratory effort. The patient is an appropriate candidate to undergo the planned procedure and sedation.     Wendi Davis

## 2018-02-23 NOTE — DISCHARGE INSTRUCTIONS
Bone Marrow Aspiration and Biopsy: What to Expect at Home  Your Recovery  The biopsy site may feel sore for several days. It can help to walk, take pain medicine, and put ice packs on the site. You will probably be able to return to work and your usual activities the day after the procedure. Your doctor or nurse will call you with the results of your test.  This care sheet gives you a general idea about how long it will take for you to recover. But each person recovers at a different pace. Follow the steps below to get better as quickly as possible. How can you care for yourself at home? Activity  ? · Rest when you feel tired. Getting enough sleep will help you recover. ? · You may drive when you are no longer taking pain pills and can quickly move your foot from the gas pedal to the brake. You must also be able to sit comfortably for a long period of time, even if you do not plan to go far. You might get caught in traffic. ? · Most people are able to return to work the day after the procedure. Medicines  ? · Your doctor will tell you if and when you can restart your medicines. He or she will also give you instructions about taking any new medicines. ? · If you take blood thinners, such as warfarin (Coumadin), clopidogrel (Plavix), or aspirin, be sure to talk to your doctor. He or she will tell you if and when to start taking those medicines again. Make sure that you understand exactly what your doctor wants you to do. ? · Be safe with medicines. Take pain medicines exactly as directed. ¨ If the doctor gave you a prescription medicine for pain, take it as prescribed. ¨ If you are not taking a prescription pain medicine, take an over-the-counter medicine such as acetaminophen (Tylenol), ibuprofen (Advil, Motrin), or naproxen (Aleve). Read and follow all instructions on the label. ¨ Do not take two or more pain medicines at the same time unless the doctor told you to.  Many pain medicines have acetaminophen, which is Tylenol. Too much acetaminophen (Tylenol) can be harmful. ? · If you think your pain medicine is making you sick to your stomach:  ¨ Take your medicine after meals (unless your doctor has told you not to). ¨ Ask your doctor for a different pain medicine. ? · If your doctor prescribed antibiotics, take them as directed. Do not stop taking them just because you feel better. ?Ice  ? · Put ice or a cold pack on the biopsy site for 10 to 20 minutes at a time. Put a thin cloth between the ice and your skin. Follow-up care is a key part of your treatment and safety. Be sure to make and go to all appointments, and call your doctor if you are having problems. It's also a good idea to know your test results and keep a list of the medicines you take. When should you call for help? Call 911 anytime you think you may need emergency care. For example, call if:  ? · You passed out (lost consciousness). ?Call your doctor now or seek immediate medical care if:  ? · You have signs of infection, such as:  ¨ Increased pain, swelling, warmth, or redness. ¨ Red streaks leading from the biopsy site. ¨ Pus draining from the biopsy site. ¨ Swollen lymph nodes in your neck, armpits, or groin. ¨ A fever. ? Watch closely for any changes in your health, and be sure to contact your doctor if:  ? · You are not getting better as expected. Where can you learn more? Go to http://messi-thang.info/. Enter E148 in the search box to learn more about \"Bone Marrow Aspiration and Biopsy: What to Expect at Home. \"  Current as of: October 14, 2016  Content Version: 11.4  © 8005-3509 [employer]. Care instructions adapted under license by [employer] (which disclaims liability or warranty for this information).  If you have questions about a medical condition or this instruction, always ask your healthcare professional. Kassandra Crook disclaims any warranty or liability for your use of this information.

## 2018-02-23 NOTE — IP AVS SNAPSHOT
303 [facility] 
 
 
 106 [facility] 4201 [facility] Patient: Dave Al Jr. MRN: [id number] ZQU:5/53/3074 About your hospitalization You were admitted on:  February 23, 2018 You last received care in the:  [facility] You were discharged on:  February 23, 2018 Why you were hospitalized Your primary diagnosis was:  Not on File Follow-up Information Follow up With Details Comments Contact Info Rashmi Ortega MD   [address] [place] [address] [phone] Gracie Sheikh MD Schedule an appointment as soon as possible for a visit in 1 week  27 [facility] 105 706 [facility] 
[phone] Discharge Orders None A check gil indicates which time of day the medication should be taken. My Medications CONTINUE taking these medications Instructions Each Dose to Equal  
 Morning Noon Evening Bedtime  
 albuterol 90 mcg/actuation inhaler Commonly known as:  PROVENTIL HFA, VENTOLIN HFA, PROAIR HFA Your last dose was: Your next dose is: Take 2 Puffs by inhalation every four (4) hours as needed for Wheezing or Shortness of Breath. Indications: BRONCHOSPASM PREVENTION, Chronic Obstructive Pulmonary Disease 2 Puff  
    
   
   
   
  
 aspirin 325 mg tablet Commonly known as:  ASPIRIN Your last dose was: Your next dose is: Take 325 mg by mouth daily. 325 mg  
    
   
   
   
  
 chlorproMAZINE 25 mg tablet Commonly known as:  THORAZINE Your last dose was: Your next dose is: Take 2 Tabs by mouth three (3) times daily as needed. Indications: INTRACTABLE HICCUPS 50 mg  
    
   
   
   
  
 cholecalciferol (VITAMIN D3) 5,000 unit Tab tablet Commonly known as:  VITAMIN D3 Your last dose was: Your next dose is: Take 5,000 Units by mouth daily. 5000 Units  
    
   
   
   
  
 ibuprofen 600 mg tablet Commonly known as:  MOTRIN Your last dose was: Your next dose is: Take 1 Tab by mouth every six (6) hours as needed for Pain. 600 mg  
    
   
   
   
  
 LIPITOR 20 mg tablet Generic drug:  atorvastatin Your last dose was: Your next dose is: Take  by mouth daily. NexIUM 20 mg capsule Generic drug:  esomeprazole Your last dose was: Your next dose is: Take 20 mg by mouth daily. 20 mg  
    
   
   
   
  
 oxyCODONE-acetaminophen 5-325 mg per tablet Commonly known as:  PERCOCET Your last dose was: Your next dose is: Take 1 Tab by mouth every six (6) hours as needed for Pain. Max Daily Amount: 4 Tabs. 1 Tab  
    
   
   
   
  
 predniSONE 5 mg tablet Commonly known as:  Julia Senthil Your last dose was: Your next dose is: Take  by mouth daily. Take 1 to 3 tabs daily Discharge Instructions Bone Marrow Aspiration and Biopsy: What to Expect at [facility] Your Recovery The biopsy site may feel sore for several days. It can help to walk, take pain medicine, and put ice packs on the site. You will probably be able to return to work and your usual activities the day after the procedure. Your doctor or nurse will call you with the results of your test. 
This care sheet gives you a general idea about how long it will take for you to recover. But each person recovers at a different pace. Follow the steps below to get better as quickly as possible. How can you care for yourself at home? Activity ? · Rest when you feel tired. Getting enough sleep will help you recover. ? · You may drive when you are no longer taking pain pills and can quickly move your foot from the gas pedal to the brake.  You must also be able to sit comfortably for a long period of time, even if you do not plan to go far. You might get caught in traffic. ? · Most people are able to return to work the day after the procedure. Medicines ? · Your doctor will tell you if and when you can restart your medicines. He or she will also give you instructions about taking any new medicines. ? · If you take blood thinners, such as warfarin (Coumadin), clopidogrel (Plavix), or aspirin, be sure to talk to your doctor. He or she will tell you if and when to start taking those medicines again. Make sure that you understand exactly what your doctor wants you to do. ? · Be safe with medicines. Take pain medicines exactly as directed. ¨ If the doctor gave you a prescription medicine for pain, take it as prescribed. ¨ If you are not taking a prescription pain medicine, take an over-the-counter medicine such as acetaminophen (Tylenol), ibuprofen (Advil, Motrin), or naproxen (Aleve). Read and follow all instructions on the label. ¨ Do not take two or more pain medicines at the same time unless the doctor told you to. Many pain medicines have acetaminophen, which is Tylenol. Too much acetaminophen (Tylenol) can be harmful. ? · If you think your pain medicine is making you sick to your stomach: 
¨ Take your medicine after meals (unless your doctor has told you not to). ¨ Ask your doctor for a different pain medicine. ? · If your doctor prescribed antibiotics, take them as directed. Do not stop taking them just because you feel better. ?Ice ? · Put ice or a cold pack on the biopsy site for 10 to 20 minutes at a time. Put a thin cloth between the ice and your skin. Follow-up care is a key part of your treatment and safety. Be sure to make and go to all appointments, and call your doctor if you are having problems. It's also a good idea to know your test results and keep a list of the medicines you take. When should you call for help? Call 911 anytime you think you may need emergency care. For example, call if: 
? · You passed out (lost consciousness). ?Call your doctor now or seek immediate medical care if: 
? · You have signs of infection, such as: 
¨ Increased pain, swelling, warmth, or redness. ¨ Red streaks leading from the biopsy site. ¨ Pus draining from the biopsy site. ¨ Swollen lymph nodes in your neck, armpits, or groin. ¨ A fever. ? Watch closely for any changes in your health, and be sure to contact your doctor if: 
? · You are not getting better as expected. Where can you learn more? Go to http://messi-thang.info/. Enter E148 in the search box to learn more about \"Bone Marrow Aspiration and Biopsy: What to Expect at Home. \" Current as of: October 14, 2016 Content Version: 11.4 © 2478-0135 [employer]. Care instructions adapted under license by [employer] original products (which disclaims liability or warranty for this information). If you have questions about a medical condition or this instruction, always ask your healthcare professional. [place] [address] any warranty or liability for your use of this information. Providers Seen During Your Hospitalization Provider Specialty Primary office phone Stew Ovalles MD Hematology and Oncology [phone] Your Primary Care Physician (PCP) Primary Care Physician Office Phone Office Fax Rosa Isela Rangel [phone] You are allergic to the following No active allergies Recent Documentation Height Weight BMI Smoking Status 1.854 m 86.6 kg 25.2 kg/m2 Former Smoker Emergency Contacts Name Discharge Info Relation Home Work Mobile GroupCharger,Gene III DISCHARGE CAREGIVER [3] Son [22] [phone] [facility] DISCHARGE CAREGIVER [3] Brother [24]   [phone] Derrel Couch  Sister [23] [phone] ServandoGene  Child [2] [phone] Patient Belongings The following personal items are in your possession at time of discharge: 
     Visual Aid: None Please provide this summary of care documentation to your next provider. Signatures-by signing, you are acknowledging that this After Visit Summary has been reviewed with you and you have received a copy. Patient Signature:  ____________________________________________________________ Date:  ____________________________________________________________  
  
Stew Mealing Provider Signature:  ____________________________________________________________ Date:  ____________________________________________________________

## 2018-05-08 ENCOUNTER — OFFICE VISIT (OUTPATIENT)
Dept: VASCULAR SURGERY | Age: 63
End: 2018-05-08

## 2018-05-08 DIAGNOSIS — I72.3 ILIAC ARTERY ANEURYSM, LEFT (HCC): ICD-10-CM

## 2018-05-08 DIAGNOSIS — I70.211 ATHEROSCLEROSIS OF NATIVE ARTERY OF RIGHT LOWER EXTREMITY WITH INTERMITTENT CLAUDICATION (HCC): ICD-10-CM

## 2018-05-08 DIAGNOSIS — I73.9 PAD (PERIPHERAL ARTERY DISEASE) (HCC): ICD-10-CM

## 2018-05-08 NOTE — PROCEDURES
[facility]  *** FINAL REPORT ***    Name: Marlene Oconnor  MRN: [id number]       Outpatient  : 1955  HIS Order #: [id number]  [id number] [facility] Visit #: [id number]  Date: 08 May 2018    TYPE OF TEST: Peripheral Arterial Testing    REASON FOR TEST  Peripheral vascular dz NOS    Right Leg  Segmentals: Abnormal                     mmHg  Brachial         128  High thigh  Low thigh  Calf              91  Posterior tibial  85  Dorsalis pedis    82  Peroneal  Metatarsal  Toe pressure      66  Doppler:    Abnormal  Ankle/Brachial: 0.63    Left Leg  Segmentals: Abnormal                     mmHg  Brachial         135  High thigh  Low thigh  Calf              78  Posterior tibial  76  Dorsalis pedis    83  Peroneal  Metatarsal  Toe pressure      51  Doppler:    Abnormal  Ankle/Brachial: 0.61    INTERPRETATION/FINDINGS  Physiologic testing was performed using continuous wave doppler and  segmental pressures. 1. Moderate peripheral arterial disease indicated at rest by ankle  brachial indices bilaterally with femoral occlusive disease suggested  and multi level involvement. 2. Monophasic doppler signals at the popliteal, posterior tibial and  peroneal arteries bilaterally. 3. The right ankle/brachial index is 0.63 and the left ankle/brachial  index is 0.61.  4. The right digital/brachial index is 0.49 and the left  digital/brachial index is 0.38. Compared to 2018 exam, there is no significant change. ADDITIONAL COMMENTS    I have personally reviewed the data relevant to the interpretation of  this  study. TECHNOLOGIST: Cassia Rivera RDMS  Signed: 2018 11:35 AM    PHYSICIAN: Siva Meadows.  Michelle Au MD  Signed: 2018 09:03 AM

## 2018-05-21 ENCOUNTER — OFFICE VISIT (OUTPATIENT)
Dept: VASCULAR SURGERY | Age: 63
End: 2018-05-21

## 2018-05-21 VITALS
DIASTOLIC BLOOD PRESSURE: 64 MMHG | HEIGHT: 73 IN | HEART RATE: 64 BPM | RESPIRATION RATE: 17 BRPM | SYSTOLIC BLOOD PRESSURE: 118 MMHG | WEIGHT: 191 LBS | BODY MASS INDEX: 25.31 KG/M2

## 2018-05-21 DIAGNOSIS — I70.211 ATHEROSCLEROSIS OF NATIVE ARTERY OF RIGHT LOWER EXTREMITY WITH INTERMITTENT CLAUDICATION (HCC): Primary | ICD-10-CM

## 2018-05-21 DIAGNOSIS — I72.3 ILIAC ARTERY ANEURYSM, LEFT (HCC): ICD-10-CM

## 2018-05-21 DIAGNOSIS — I73.9 PAD (PERIPHERAL ARTERY DISEASE) (HCC): ICD-10-CM

## 2018-05-21 DIAGNOSIS — I77.811 AORTIC ECTASIA, ABDOMINAL (HCC): ICD-10-CM

## 2018-05-21 RX ORDER — LEFLUNOMIDE 20 MG/1
20 TABLET ORAL DAILY
COMMUNITY

## 2018-05-21 NOTE — MR AVS SNAPSHOT
[address] [facility] 
 
 
 [address] [place] [address] 355 [address] [facility] 
[phone] Patient: Dave Al Jr. MRN: [id number] REW:[id number] Visit Information Date & Time Provider Department Dept. Phone Encounter #  
 5/21/2018  1:30 PM [facility] [phone] [id number] Follow-up Instructions Follow-up and Disposition History Your Appointments 7/2/2018  9:00 AM  
HOSPITAL DISCHARGE with MD [facility]) Appt Note: DC Rt & Lt angio [address] [facility] [place] [address] [facility]  
[phone] 23[address] [facility] [place] [address] [facility] Upcoming Health Maintenance Date Due Hepatitis C Screening 1955 Pneumococcal 19-64 Highest Risk (1 of 3 - PCV13) 1/25/1973 DTaP/Tdap/Td series (1 - Tdap) 1/25/1975 FOBT Q 1 YEAR AGE 50-75 1/25/2004 ZOSTER VACCINE AGE 60> 11/25/2013 MEDICARE YEARLY EXAM 3/14/2018 Influenza Age 5 to Adult 8/1/2018 Allergies as of 5/21/2018  Review Complete On: 5/21/2018 By: Shruti Doshi MD  
 No Known Allergies Current Immunizations  Reviewed on 2/5/2014 No immunizations on file. Not reviewed this visit You Were Diagnosed With   
  
 Codes Comments Atherosclerosis of native artery of right lower extremity with intermittent claudication ([facility]    -  Primary ICD-10-CM: R46.334 ICD-9-CM: 440.21 PAD (peripheral artery disease) (HCC)     ICD-10-CM: I73.9 ICD-9-CM: 443.9 Aortic ectasia, abdominal ([facility]     ICD-10-CM: O09.740 ICD-9-CM: 447.72 Iliac artery aneurysm, left (HCC)     ICD-10-CM: I72.3 ICD-9-CM: 848. 2 Vitals BP Pulse Resp Height(growth percentile) Weight(growth percentile) BMI  
 118/64 (BP 1 Location: Left arm, BP Patient Position: Sitting) 64 17 6' 1\" (1.854 m) 191 lb (86.6 kg) 25.2 kg/m2 Smoking Status Former Smoker Vitals History BMI and BSA Data Body Mass Index Body Surface Area  
 25.2 kg/m 2 2.11 m 2 Preferred Pharmacy Pharmacy Name Phone [phone], [address] [phone] Your Updated Medication List  
  
   
This list is accurate as of 5/21/18  1:48 PM.  Always use your most recent med list.  
  
  
  
  
 albuterol 90 mcg/actuation inhaler Commonly known as:  PROVENTIL HFA, VENTOLIN HFA, PROAIR HFA Take 2 Puffs by inhalation every four (4) hours as needed for Wheezing or Shortness of Breath. Indications: BRONCHOSPASM PREVENTION, Chronic Obstructive Pulmonary Disease  
  
 aspirin 325 mg tablet Commonly known as:  ASPIRIN Take 325 mg by mouth daily. chlorproMAZINE 25 mg tablet Commonly known as:  THORAZINE Take 2 Tabs by mouth three (3) times daily as needed. Indications: INTRACTABLE HICCUPS  
  
 cholecalciferol (VITAMIN D3) 5,000 unit Tab tablet Commonly known as:  VITAMIN D3 Take 5,000 Units by mouth daily. ibuprofen 600 mg tablet Commonly known as:  MOTRIN Take 1 Tab by mouth every six (6) hours as needed for Pain.  
  
 leflunomide 20 mg tablet Commonly known as:  Jacob Lash Take 20 mg by mouth daily. LIPITOR 20 mg tablet Generic drug:  atorvastatin Take  by mouth daily. NexIUM 20 mg capsule Generic drug:  esomeprazole Take 20 mg by mouth daily. oxyCODONE-acetaminophen 5-325 mg per tablet Commonly known as:  PERCOCET Take 1 Tab by mouth every six (6) hours as needed for Pain. Max Daily Amount: 4 Tabs. predniSONE 5 mg tablet Commonly known as:  Kelsey Norlander Take  by mouth daily. Take 1 to 3 tabs daily Please provide this summary of care documentation to your next provider. Your primary care clinician is listed as John Valenzuela. If you have any questions after today's visit, please call [phone].

## 2018-05-21 NOTE — PROGRESS NOTES
1. Have you been to an emergency room or urgent care clinic since your last visit? no    Hospitalized since your last visit? If yes, where, when, and reason for visit? no  2. Have you seen or consulted any other health care providers outside of the [facility] since your last visit including any procedures, health maintenance items.  If yes, where, when and reason for visit? no

## 2018-05-21 NOTE — PROGRESS NOTES
Dave PURCELL Servando Tolbert. Chief Complaint   Patient presents with    Carotid Artery Stenosis     iliac stenosis       History and Physical    Gene F Leilani Gosselin. is a 58 y.o. male with known bilateral lower extremity claudication right greater than left. Patient states that his claudication is about 50 yards before he has to stop and is lifestyle limiting for him. Unfortunately his grandson is in the hospital at [facility] and has a lot of difficulty walking from the parking deck to the hospital and get into the hospital bed. No rest pain or ulcerations at this current time. He has been doing a walking program but this is been unsuccessful at being able to get him walking further. No fevers or chills    Past Medical History:   Diagnosis Date    Arthritis     Cancer of bone ([address]     COPD     Heart failure (HCC)     stents x2 in 2008    Hypertension      Past Surgical History:   Procedure Laterality Date    CARDIAC SURG PROCEDURE UNLIST      COLONOSCOPY N/A 8/15/2016    COLONOSCOPY with polypectomy, biopsy and clip performed by Jessica Jorge MD at [address]   6-5-13    HX HEART CATHETERIZATION  6-5-13    REPAIR ING HERNIA,5+Y/O,REDUCIBL Right 12/05/2017    Dr. Joaquín Jackson     Patient Active Problem List   Diagnosis Code    Sepsis(995.91) A41.9    Immunocompromised state ([address] D84.9    Anemia, unspecified D64.9    Bone cancer ([address] C41.9    PAD (peripheral artery disease) ([address] I73.9    Atherosclerosis of native artery of right lower extremity with intermittent claudication ([address] I70.211    Aortic ectasia, abdominal ([address] I77.811    Iliac artery aneurysm, left (HCC) I72.3    Right inguinal hernia K40.90     Current Outpatient Prescriptions   Medication Sig Dispense Refill    leflunomide (ARAVA) 20 mg tablet Take 20 mg by mouth daily.  predniSONE (DELTASONE) 5 mg tablet Take  by mouth daily.  Take 1 to 3 tabs daily      albuterol (PROVENTIL HFA, VENTOLIN HFA, PROAIR HFA) 90 mcg/actuation inhaler Take 2 Puffs by inhalation every four (4) hours as needed for Wheezing or Shortness of Breath. Indications: BRONCHOSPASM PREVENTION, Chronic Obstructive Pulmonary Disease 1 Inhaler 0    atorvastatin (LIPITOR) 20 mg tablet Take  by mouth daily.  cholecalciferol, VITAMIN D3, (VITAMIN D3) 5,000 unit tab tablet Take 5,000 Units by mouth daily.  aspirin (ASPIRIN) 325 mg tablet Take 325 mg by mouth daily.  esomeprazole (NEXIUM) 20 mg capsule Take 20 mg by mouth daily.  chlorproMAZINE (THORAZINE) 25 mg tablet Take 2 Tabs by mouth three (3) times daily as needed. Indications: INTRACTABLE HICCUPS 12 Tab 0    oxyCODONE-acetaminophen (PERCOCET) 5-325 mg per tablet Take 1 Tab by mouth every six (6) hours as needed for Pain. Max Daily Amount: 4 Tabs. 30 Tab 0    ibuprofen (MOTRIN) 600 mg tablet Take 1 Tab by mouth every six (6) hours as needed for Pain. 30 Tab 0     No Known Allergies  Social History     Social History    Marital status:      Spouse name: N/A    Number of children: N/A    Years of education: N/A     Occupational History    Not on file.      Social History Main Topics    Smoking status: Former Smoker     Packs/day: 2.00     Years: 39.00     Quit date: 8/10/2012    Smokeless tobacco: Never Used    Alcohol use No    Drug use: No    Sexual activity: No     Other Topics Concern    Not on file     Social History Narrative      Family History   Problem Relation Age of Onset    Stroke Mother     Heart Disease Father        Physical Exam:    Visit Vitals    /64 (BP 1 Location: Left arm, BP Patient Position: Sitting)    Pulse 64    Resp 17    Ht 6' 1\" (1.854 m)    Wt 191 lb (86.6 kg)    BMI 25.2 kg/m2      General: Well-appearing male in no acute distress  HEENT: EOMI no scleral icterus is noted  Pulmonary: No increased work of breathing is noted  Extremities: Warm and perfused bilaterally nonpalpable distal pulses bilaterally  Neuro: Cranial nerves II through XII grossly intact    Impression and Plan:  Kelly Molina. is a 58 y.o. male with peripheral arterial disease of bilateral lower extremities and known aortic ectasia as well as left common iliac artery aneurysm. Patient seems to be doing fairly well overall. But does have lifestyle limiting claudication of bilateral lower extremities right greater than left. We will move forward with a right lower extremity angiogram first and then the next week we can bring him back for a left lower extremity angiogram.  Hopefully we can end up improving his claudications that he can walk further. Patient was given risks benefits of the procedure including but not limited to bleeding, infection, damage to adjacent structures, MI, stroke, death, loss of lower extremity, need for further surgery. Patient is understanding and will be scheduled for surgery. We reviewed the plan with the patient and the patient understands. We also gave the patient appropriate instructions on their disease process and when to call back. Follow-up Disposition: Not on [address] [place] [address], MD    PLEASE NOTE:  This document has been produced using voice recognition software. Unrecognized errors in transcription may be present.

## 2018-06-20 RX ORDER — SODIUM CHLORIDE 0.9 % (FLUSH) 0.9 %
5-10 SYRINGE (ML) INJECTION EVERY 8 HOURS
Status: CANCELLED | OUTPATIENT
Start: 2018-06-20

## 2018-06-20 RX ORDER — SODIUM CHLORIDE 0.9 % (FLUSH) 0.9 %
5-10 SYRINGE (ML) INJECTION AS NEEDED
Status: CANCELLED | OUTPATIENT
Start: 2018-06-20

## 2018-06-21 ENCOUNTER — HOSPITAL ENCOUNTER (OUTPATIENT)
Age: 63
Setting detail: OUTPATIENT SURGERY
Discharge: HOME | End: 2018-06-21
Attending: SURGERY | Admitting: SURGERY
Payer: MEDICARE

## 2018-06-21 VITALS
RESPIRATION RATE: 21 BRPM | OXYGEN SATURATION: 97 % | HEART RATE: 66 BPM | SYSTOLIC BLOOD PRESSURE: 123 MMHG | HEIGHT: 73 IN | WEIGHT: 197 LBS | DIASTOLIC BLOOD PRESSURE: 76 MMHG | TEMPERATURE: 97.9 F | BODY MASS INDEX: 26.11 KG/M2

## 2018-06-21 DIAGNOSIS — I70.213 ATHEROSCLER OF NATIVE ARTERY OF BOTH LEGS WITH INTERMIT CLAUDICATION (HCC): ICD-10-CM

## 2018-06-21 DIAGNOSIS — I73.9 PERIPHERAL VASCULAR DISEASE, UNSPECIFIED (HCC): ICD-10-CM

## 2018-06-21 LAB
ANION GAP BLD CALC-SCNC: 18 MMOL/L (ref 10–20)
BUN BLD-MCNC: 8 MG/DL (ref 7–18)
CA-I BLD-MCNC: 1.16 MMOL/L (ref 1.12–1.32)
CHLORIDE BLD-SCNC: 106 MMOL/L (ref 100–108)
CO2 BLD-SCNC: 23 MMOL/L (ref 19–24)
CREAT UR-MCNC: 0.8 MG/DL (ref 0.6–1.3)
GLUCOSE BLD STRIP.AUTO-MCNC: 99 MG/DL (ref 74–106)
HCT VFR BLD CALC: 40 % (ref 36–49)
HGB BLD-MCNC: 13.6 G/DL (ref 12–16)
POTASSIUM BLD-SCNC: 3.7 MMOL/L (ref 3.5–5.5)
SODIUM BLD-SCNC: 142 MMOL/L (ref 136–145)

## 2018-06-21 PROCEDURE — 80047 BASIC METABLC PNL IONIZED CA: CPT

## 2018-06-21 NOTE — H&P
Surgery History and Physical    Subjective:      Dave Salazar. is a 58 y.o. male who presents with bilateral lower extremity claudication R>L. Patient Active Problem List    Diagnosis Date Noted    Right inguinal hernia 10/04/2017    Iliac artery aneurysm, left ([address] 12/12/2016    PAD (peripheral artery disease) ([address] 08/29/2016    Atherosclerosis of native artery of right lower extremity with intermittent claudication ([address] 08/29/2016    Aortic ectasia, abdominal ([address] 08/29/2016    Sepsis(995.91) 09/12/2013    Immunocompromised state ([address] 09/12/2013    Anemia, unspecified 09/12/2013    Bone cancer ([address] 09/12/2013     Past Medical History:   Diagnosis Date    Arthritis     Cancer of bone ([address]     COPD     Heart failure ([address]     stents x2 in 2008    Hypertension       Past Surgical History:   Procedure Laterality Date    CARDIAC SURG PROCEDURE UNLIST      COLONOSCOPY N/A 8/15/2016    COLONOSCOPY with polypectomy, biopsy and clip performed by Good Deleon MD at [facility] ENDOSCOPY    HX CORONARY STENT PLACEMENT  6-5-13    HX HEART CATHETERIZATION  6-5-13    REPAIR ING HERNIA,5+Y/O,REDUCIBL Right 12/05/2017    Dr. Sanchez Carrier History   Substance Use Topics    Smoking status: Former Smoker     Packs/day: 2.00     Years: 39.00     Quit date: 8/10/2012    Smokeless tobacco: Never Used    Alcohol use No      Family History   Problem Relation Age of Onset    Stroke Mother     Heart Disease Father       Prior to Admission medications    Medication Sig Start Date End Date Taking? Authorizing Provider   leflunomide (ARAVA) 20 mg tablet Take 20 mg by mouth daily. Yes Historical Provider   predniSONE (DELTASONE) 5 mg tablet Take  by mouth daily. Take 1 to 3 tabs daily   Yes Historical Provider   albuterol (PROVENTIL HFA, VENTOLIN HFA, PROAIR HFA) 90 mcg/actuation inhaler Take 2 Puffs by inhalation every four (4) hours as needed for Wheezing or Shortness of Breath.  Indications: BRONCHOSPASM PREVENTION, Chronic Obstructive Pulmonary Disease 12/10/17  Yes Rodo Yip DO   atorvastatin (LIPITOR) 20 mg tablet Take  by mouth daily. Yes Historical Provider   cholecalciferol, VITAMIN D3, (VITAMIN D3) 5,000 unit tab tablet Take 5,000 Units by mouth daily. Yes Historical Provider   aspirin (ASPIRIN) 325 mg tablet Take 325 mg by mouth daily. Yes Akil Ramos MD   esomeprazole (NEXIUM) 20 mg capsule Take 20 mg by mouth daily. Yes Akil Ramos MD   chlorproMAZINE (THORAZINE) 25 mg tablet Take 2 Tabs by mouth three (3) times daily as needed. Indications: INTRACTABLE HICCUPS 12/10/17   Rodo Yip DO   oxyCODONE-acetaminophen (PERCOCET) 5-325 mg per tablet Take 1 Tab by mouth every six (6) hours as needed for Pain. Max Daily Amount: 4 Tabs. 12/5/17   Clarisse Potts MD   ibuprofen (MOTRIN) 600 mg tablet Take 1 Tab by mouth every six (6) hours as needed for Pain. 8/29/17   Warden Yeimi MD     No Known Allergies      ROS:  Pertinent items are noted in HPI. Unless otherwise mentioned in the HPI. Objective:     Patient Vitals for the past 8 hrs:   Height Weight   06/21/18 0718 6' 1\" (1.854 m) 197 lb (89.4 kg)       No data recorded. Physical Exam:  GENERAL: alert, cooperative, no distress, appears stated age, THROAT & NECK: normal and no erythema or exudates noted. , LUNG: clear to auscultation bilaterally, HEART: regular rate and rhythm, S1, S2 normal, no murmur, click, rub or gallop, ABDOMEN: soft, non-tender. Bowel sounds normal. No masses,  no organomegaly    Labs: No results found for this or any previous visit (from the past 24 hour(s)).     Data Review:    CBC:   Lab Results   Component Value Date/Time    WBC 5.8 02/23/2018 07:46 AM    RBC 5.23 02/23/2018 07:46 AM    HGB 13.5 02/23/2018 07:46 AM    HCT 42.5 02/23/2018 07:46 AM    PLATELET 325 84/84/1654 07:46 AM      BMP:   Lab Results   Component Value Date/Time    Glucose 106 (H) 02/23/2018 07:46 AM    Sodium 140 02/23/2018 07:46 AM    Potassium 4.1 02/23/2018 07:46 AM    Chloride 108 02/23/2018 07:46 AM    CO2 24 02/23/2018 07:46 AM    BUN 8 02/23/2018 07:46 AM    Creatinine 0.92 02/23/2018 07:46 AM    Calcium 9.1 02/23/2018 07:46 AM     Coagulation:   Lab Results   Component Value Date/Time    Prothrombin time 13.1 02/23/2018 07:46 AM    INR 1.0 02/23/2018 07:46 AM    aPTT 27.5 02/23/2018 07:46 AM       Assessment:     Active Problems:    * No active hospital problems.  *      Plan:     Left leg angio first patient today states wants left leg first.    Signed By: Mekhi Harrington MD     June 21, 2018

## 2018-06-27 RX ORDER — SODIUM CHLORIDE 0.9 % (FLUSH) 0.9 %
5-10 SYRINGE (ML) INJECTION EVERY 8 HOURS
Status: CANCELLED | OUTPATIENT
Start: 2018-06-27

## 2018-06-27 RX ORDER — SODIUM CHLORIDE 0.9 % (FLUSH) 0.9 %
5-10 SYRINGE (ML) INJECTION AS NEEDED
Status: CANCELLED | OUTPATIENT
Start: 2018-06-27

## 2018-06-28 ENCOUNTER — HOSPITAL ENCOUNTER (OUTPATIENT)
Age: 63
Setting detail: OUTPATIENT SURGERY
Discharge: HOME | End: 2018-06-28
Attending: SURGERY | Admitting: SURGERY

## 2018-06-28 VITALS
HEART RATE: 70 BPM | WEIGHT: 194 LBS | OXYGEN SATURATION: 100 % | RESPIRATION RATE: 26 BRPM | SYSTOLIC BLOOD PRESSURE: 146 MMHG | BODY MASS INDEX: 26.28 KG/M2 | DIASTOLIC BLOOD PRESSURE: 111 MMHG | HEIGHT: 72 IN

## 2018-06-28 DIAGNOSIS — I70.213 ATHEROSCLER OF NATIVE ARTERY OF BOTH LEGS WITH INTERMIT CLAUDICATION (HCC): ICD-10-CM

## 2018-06-28 DIAGNOSIS — I73.9 PERIPHERAL VASCULAR DISEASE, UNSPECIFIED (HCC): ICD-10-CM

## 2018-06-28 NOTE — INTERVAL H&P NOTE
H&P Update:  Dave PURCELL Servando Armenta was seen and examined. History and physical has been reviewed. Significant clinical changes have occurred as noted:  Continues to want left leg first rescheduled previously from equipment malfunction.     Signed By: Lola Chua MD     June 28, 2018 11:36 AM

## 2018-06-28 NOTE — ROUTINE PROCESS
Pt brought to [place] [address] ambulatory. Pt placed in bay 16 and connected to monitor. Baseline VS taken and PIV started x 1. Admission database completed. Pt ready for ordered procedure.

## 2018-06-28 NOTE — H&P (VIEW-ONLY)
Surgery History and Physical    Subjective:      Dave Tejada. is a 58 y.o. male who presents with bilateral lower extremity claudication R>L. Patient Active Problem List    Diagnosis Date Noted    Right inguinal hernia 10/04/2017    Iliac artery aneurysm, left ([address] 12/12/2016    PAD (peripheral artery disease) ([address] 08/29/2016    Atherosclerosis of native artery of right lower extremity with intermittent claudication ([address] 08/29/2016    Aortic ectasia, abdominal ([address] 08/29/2016    Sepsis(995.91) 09/12/2013    Immunocompromised state ([address] 09/12/2013    Anemia, unspecified 09/12/2013    Bone cancer ([address] 09/12/2013     Past Medical History:   Diagnosis Date    Arthritis     Cancer of bone ([address]     COPD     Heart failure ([address]     stents x2 in 2008    Hypertension       Past Surgical History:   Procedure Laterality Date    CARDIAC SURG PROCEDURE UNLIST      COLONOSCOPY N/A 8/15/2016    COLONOSCOPY with polypectomy, biopsy and clip performed by Kenia Villalpando MD at [facility] ENDOSCOPY    HX CORONARY STENT PLACEMENT  6-5-13    HX HEART CATHETERIZATION  6-5-13    REPAIR ING HERNIA,5+Y/O,REDUCIBL Right 12/05/2017    Dr. Raleigh Winston History   Substance Use Topics    Smoking status: Former Smoker     Packs/day: 2.00     Years: 39.00     Quit date: 8/10/2012    Smokeless tobacco: Never Used    Alcohol use No      Family History   Problem Relation Age of Onset    Stroke Mother     Heart Disease Father       Prior to Admission medications    Medication Sig Start Date End Date Taking? Authorizing Provider   leflunomide (ARAVA) 20 mg tablet Take 20 mg by mouth daily. Yes Historical Provider   predniSONE (DELTASONE) 5 mg tablet Take  by mouth daily. Take 1 to 3 tabs daily   Yes Historical Provider   albuterol (PROVENTIL HFA, VENTOLIN HFA, PROAIR HFA) 90 mcg/actuation inhaler Take 2 Puffs by inhalation every four (4) hours as needed for Wheezing or Shortness of Breath.  Indications: BRONCHOSPASM PREVENTION, Chronic Obstructive Pulmonary Disease 12/10/17  Yes Keiry Ward DO   atorvastatin (LIPITOR) 20 mg tablet Take  by mouth daily. Yes Historical Provider   cholecalciferol, VITAMIN D3, (VITAMIN D3) 5,000 unit tab tablet Take 5,000 Units by mouth daily. Yes Historical Provider   aspirin (ASPIRIN) 325 mg tablet Take 325 mg by mouth daily. Yes Akil Ramos MD   esomeprazole (NEXIUM) 20 mg capsule Take 20 mg by mouth daily. Yes Akil Ramos MD   chlorproMAZINE (THORAZINE) 25 mg tablet Take 2 Tabs by mouth three (3) times daily as needed. Indications: INTRACTABLE HICCUPS 12/10/17   Keiry Ward DO   oxyCODONE-acetaminophen (PERCOCET) 5-325 mg per tablet Take 1 Tab by mouth every six (6) hours as needed for Pain. Max Daily Amount: 4 Tabs. 12/5/17   Mallory Hines MD   ibuprofen (MOTRIN) 600 mg tablet Take 1 Tab by mouth every six (6) hours as needed for Pain. 8/29/17   Gilbert Billy MD     No Known Allergies      ROS:  Pertinent items are noted in HPI. Unless otherwise mentioned in the HPI. Objective:     Patient Vitals for the past 8 hrs:   Height Weight   06/21/18 0718 6' 1\" (1.854 m) 197 lb (89.4 kg)       No data recorded. Physical Exam:  GENERAL: alert, cooperative, no distress, appears stated age, THROAT & NECK: normal and no erythema or exudates noted. , LUNG: clear to auscultation bilaterally, HEART: regular rate and rhythm, S1, S2 normal, no murmur, click, rub or gallop, ABDOMEN: soft, non-tender. Bowel sounds normal. No masses,  no organomegaly    Labs: No results found for this or any previous visit (from the past 24 hour(s)).     Data Review:    CBC:   Lab Results   Component Value Date/Time    WBC 5.8 02/23/2018 07:46 AM    RBC 5.23 02/23/2018 07:46 AM    HGB 13.5 02/23/2018 07:46 AM    HCT 42.5 02/23/2018 07:46 AM    PLATELET 122 82/30/6459 07:46 AM      BMP:   Lab Results   Component Value Date/Time    Glucose 106 (H) 02/23/2018 07:46 AM    Sodium 140 02/23/2018 07:46 AM    Potassium 4.1 02/23/2018 07:46 AM    Chloride 108 02/23/2018 07:46 AM    CO2 24 02/23/2018 07:46 AM    BUN 8 02/23/2018 07:46 AM    Creatinine 0.92 02/23/2018 07:46 AM    Calcium 9.1 02/23/2018 07:46 AM     Coagulation:   Lab Results   Component Value Date/Time    Prothrombin time 13.1 02/23/2018 07:46 AM    INR 1.0 02/23/2018 07:46 AM    aPTT 27.5 02/23/2018 07:46 AM       Assessment:     Active Problems:    * No active hospital problems.  *      Plan:     Left leg angio first patient today states wants left leg first.    Signed By: Baldo Curry MD     June 21, 2018

## 2018-07-02 RX ORDER — SODIUM CHLORIDE 0.9 % (FLUSH) 0.9 %
5-10 SYRINGE (ML) INJECTION EVERY 8 HOURS
Status: CANCELLED | OUTPATIENT
Start: 2018-07-02

## 2018-07-02 RX ORDER — SODIUM CHLORIDE 0.9 % (FLUSH) 0.9 %
5-10 SYRINGE (ML) INJECTION AS NEEDED
Status: CANCELLED | OUTPATIENT
Start: 2018-07-02

## 2018-07-05 ENCOUNTER — HOSPITAL ENCOUNTER (OUTPATIENT)
Age: 63
Setting detail: OUTPATIENT SURGERY
Discharge: HOME | End: 2018-07-05
Attending: SURGERY | Admitting: SURGERY
Payer: MEDICARE

## 2018-07-05 VITALS
TEMPERATURE: 97.9 F | SYSTOLIC BLOOD PRESSURE: 150 MMHG | RESPIRATION RATE: 19 BRPM | WEIGHT: 194 LBS | HEART RATE: 62 BPM | DIASTOLIC BLOOD PRESSURE: 78 MMHG | BODY MASS INDEX: 26.28 KG/M2 | HEIGHT: 72 IN | OXYGEN SATURATION: 97 %

## 2018-07-05 DIAGNOSIS — I70.203 ATHEROSCLEROSIS OF ARTERY OF BOTH LOWER EXTREMITIES (HCC): ICD-10-CM

## 2018-07-05 DIAGNOSIS — I73.9 PVD (PERIPHERAL VASCULAR DISEASE) (HCC): ICD-10-CM

## 2018-07-05 PROCEDURE — C1894 INTRO/SHEATH, NON-LASER: HCPCS | Performed by: SURGERY

## 2018-07-05 PROCEDURE — 74011636320 HC RX REV CODE- 636/320: Performed by: SURGERY

## 2018-07-05 PROCEDURE — 77030013744: Performed by: SURGERY

## 2018-07-05 PROCEDURE — 75625 CONTRAST EXAM ABDOMINL AORTA: CPT | Performed by: SURGERY

## 2018-07-05 PROCEDURE — 99152 MOD SED SAME PHYS/QHP 5/>YRS: CPT | Performed by: SURGERY

## 2018-07-05 PROCEDURE — 77030004530 HC CATH ANGI DX IMGR BSC -A: Performed by: SURGERY

## 2018-07-05 PROCEDURE — 75710 ARTERY X-RAYS ARM/LEG: CPT | Performed by: SURGERY

## 2018-07-05 PROCEDURE — C1769 GUIDE WIRE: HCPCS | Performed by: SURGERY

## 2018-07-05 PROCEDURE — C1760 CLOSURE DEV, VASC: HCPCS | Performed by: SURGERY

## 2018-07-05 PROCEDURE — 74011250636 HC RX REV CODE- 250/636

## 2018-07-05 PROCEDURE — 76937 US GUIDE VASCULAR ACCESS: CPT | Performed by: SURGERY

## 2018-07-05 PROCEDURE — 36247 INS CATH ABD/L-EXT ART 3RD: CPT | Performed by: SURGERY

## 2018-07-05 PROCEDURE — 99153 MOD SED SAME PHYS/QHP EA: CPT | Performed by: SURGERY

## 2018-07-05 PROCEDURE — 74011250636 HC RX REV CODE- 250/636: Performed by: SURGERY

## 2018-07-05 PROCEDURE — 74011000250 HC RX REV CODE- 250: Performed by: SURGERY

## 2018-07-05 PROCEDURE — C1887 CATHETER, GUIDING: HCPCS | Performed by: SURGERY

## 2018-07-05 RX ORDER — OXYCODONE AND ACETAMINOPHEN 5; 325 MG/1; MG/1
1 TABLET ORAL
Status: DISCONTINUED | OUTPATIENT
Start: 2018-07-05 | End: 2018-07-05 | Stop reason: HOSPADM

## 2018-07-05 RX ORDER — HEPARIN SODIUM 1000 [USP'U]/ML
INJECTION, SOLUTION INTRAVENOUS; SUBCUTANEOUS AS NEEDED
Status: DISCONTINUED | OUTPATIENT
Start: 2018-07-05 | End: 2018-07-05 | Stop reason: HOSPADM

## 2018-07-05 RX ORDER — ACETAMINOPHEN 325 MG/1
650 TABLET ORAL
Status: DISCONTINUED | OUTPATIENT
Start: 2018-07-05 | End: 2018-07-05 | Stop reason: HOSPADM

## 2018-07-05 RX ORDER — NITROGLYCERIN 40 MG/100ML
INJECTION INTRAVENOUS
Status: DISCONTINUED
Start: 2018-07-05 | End: 2018-07-05 | Stop reason: HOSPADM

## 2018-07-05 RX ORDER — HEPARIN SODIUM 1000 [USP'U]/ML
INJECTION, SOLUTION INTRAVENOUS; SUBCUTANEOUS
Status: DISCONTINUED
Start: 2018-07-05 | End: 2018-07-05 | Stop reason: HOSPADM

## 2018-07-05 RX ORDER — FENTANYL CITRATE 50 UG/ML
INJECTION, SOLUTION INTRAMUSCULAR; INTRAVENOUS AS NEEDED
Status: DISCONTINUED | OUTPATIENT
Start: 2018-07-05 | End: 2018-07-05 | Stop reason: HOSPADM

## 2018-07-05 RX ORDER — VERAPAMIL HYDROCHLORIDE 2.5 MG/ML
INJECTION, SOLUTION INTRAVENOUS
Status: DISCONTINUED
Start: 2018-07-05 | End: 2018-07-05 | Stop reason: HOSPADM

## 2018-07-05 RX ORDER — DIPHENHYDRAMINE HYDROCHLORIDE 50 MG/ML
12.5 INJECTION, SOLUTION INTRAMUSCULAR; INTRAVENOUS
Status: DISCONTINUED | OUTPATIENT
Start: 2018-07-05 | End: 2018-07-05 | Stop reason: HOSPADM

## 2018-07-05 RX ORDER — MIDAZOLAM HYDROCHLORIDE 1 MG/ML
INJECTION, SOLUTION INTRAMUSCULAR; INTRAVENOUS
Status: DISCONTINUED
Start: 2018-07-05 | End: 2018-07-05 | Stop reason: HOSPADM

## 2018-07-05 RX ORDER — MORPHINE SULFATE 10 MG/ML
1 INJECTION, SOLUTION INTRAMUSCULAR; INTRAVENOUS
Status: DISCONTINUED | OUTPATIENT
Start: 2018-07-05 | End: 2018-07-05 | Stop reason: HOSPADM

## 2018-07-05 RX ORDER — FENTANYL CITRATE 50 UG/ML
INJECTION, SOLUTION INTRAMUSCULAR; INTRAVENOUS
Status: DISCONTINUED
Start: 2018-07-05 | End: 2018-07-05 | Stop reason: HOSPADM

## 2018-07-05 RX ORDER — LIDOCAINE HYDROCHLORIDE 10 MG/ML
INJECTION, SOLUTION EPIDURAL; INFILTRATION; INTRACAUDAL; PERINEURAL AS NEEDED
Status: DISCONTINUED | OUTPATIENT
Start: 2018-07-05 | End: 2018-07-05 | Stop reason: HOSPADM

## 2018-07-05 RX ORDER — MIDAZOLAM HYDROCHLORIDE 1 MG/ML
INJECTION, SOLUTION INTRAMUSCULAR; INTRAVENOUS AS NEEDED
Status: DISCONTINUED | OUTPATIENT
Start: 2018-07-05 | End: 2018-07-05 | Stop reason: HOSPADM

## 2018-07-05 RX ORDER — ONDANSETRON 2 MG/ML
4 INJECTION INTRAMUSCULAR; INTRAVENOUS
Status: DISCONTINUED | OUTPATIENT
Start: 2018-07-05 | End: 2018-07-05 | Stop reason: HOSPADM

## 2018-07-05 NOTE — DISCHARGE INSTRUCTIONS
PERIPHERAL ANGIOGRAPHY DISCHARGE INSTRUCTIONS    1. Check puncture site frequently for swelling or bleeding. If there is any bleeding, lie down and apply pressure over the area with a clean towel or washcloth. Notify your doctor for any redness, swelling, drainage, or oozing from the puncture site. Notify your doctor for any fever or chills. 2. If the extremity becomes cold, numb, or painful call Dr. Brea Ridley  3. Activity should be limited for the next 48 hours. Climb stairs as little as possible and avoid any stooping, bending, or strenuous activity for 48 hours. No heavy lifting (anything over 10 pounds) for 7 days. 4. You may resume your usual diet. Drink more fluids than usual.  5. Have a responsible person drive you home and stay with you for at least 24 hours after your heart catheterization/angiography. 6. You may remove bandage from your Left and Arm in 24 hours. You may shower in 24 hours. No tub baths, hot tubs, or swimming for 1 week. Do not place any lotions, creams, powders, or ointments over puncture site for 1 week. You may place a clean band-aid over the puncture site each day for 5 days. Change daily. Your Recovery  Your groin or leg may have a bruise or a small lump where the catheter was put in your groin. The area may feel sore for a day or two after the procedure. You can do light activities around the house but nothing strenuous for several days. After surgery, blood may flow better throughout your leg, which can decrease leg pain, numbness, and cramping. This care sheet gives you a general idea about how long it will take for you to recover. But each person recovers at a different pace. Follow the steps below to feel better as quickly as possible. How can you care for yourself at home? Activity  · Do not do strenuous exercise and do not lift anything heavy until your doctor says it is okay. This may be for a day or two.  You can walk around the house and do light activity, such as cooking. · You may shower 24 to 48 hours after the procedure, if your doctor okays it. Pat the incision dry. Do not take a bath for 1 week, or until your doctor tells you it is okay. · Go back to regular exercise when your doctor says it is okay. Walking is a good choice. · If you work, you will probably need to take 1 or 2 days off. It depends on the type of work you do and how you feel. Diet  · You can eat your normal diet. · Drink plenty of fluids (unless your doctor tells you not to). Medicines  · Your doctor will tell you if and when you can restart your medicines. He or she will also give you instructions about taking any new medicines. · If you take blood thinners, such as warfarin (Coumadin), clopidogrel (Plavix), or aspirin, be sure to talk to your doctor. He or she will tell you if and when to start taking those medicines again. Make sure that you understand exactly what your doctor wants you to do. · Be safe with medicines. Take your medicines exactly as prescribed. Call your doctor if you think you are having a problem with your medicine. · Your doctor may prescribe a blood thinner when you go home. This helps prevent blood clots. Be sure you get instructions about how to take your medicine safely. Blood thinners can cause serious bleeding problems. Care of the catheter site  · Keep a bandage over the spot where the catheter was inserted for the first day, or for as long as your doctor recommends. · Put ice or a cold pack on the area for 10 to 20 minutes at a time to help with soreness or swelling. Do this every few hours. Put a thin cloth between the ice and your skin. Follow-up care is a key part of your treatment and safety. Be sure to make and go to all appointments, and call your doctor if you are having problems. It's also a good idea to know your test results and keep a list of the medicines you take.     Sedation for a Medical Procedure: Care Instructions  Your Care Instructions  For a minor procedure or surgery, you will get a sedative to help you relax. This drug will make you sleepy. It is usually given in a vein (by IV). A shot may also be used to numb the area. If you had local anesthesia, you may feel some pain and discomfort as it wears off. If you have pain, don't be afraid to say so. Pain medicine works better if you take it before the pain gets bad. Common side effects from sedation include:  · Feeling sleepy. (Your doctors and nurses will make sure you are not too sleepy to go home.)  · Nausea and vomiting. This usually does not last long. · Feeling tired. Follow-up care is a key part of your treatment and safety. Be sure to make and go to all appointments, and call your doctor if you are having problems. It's also a good idea to know your test results and keep a list of the medicines you take. How can you care for yourself at home? Activity  · Don't do anything for 24 hours that requires attention to detail. It takes time for the medicine effects to completely wear off. · For your safety, you should not drive or operate any machinery that could be dangerous until the medicine wears off and you can think clearly and react easily. · Rest when you feel tired. Getting enough sleep will help you recover. Diet  · You can eat your normal diet, unless your doctor gives you other instructions. If your stomach is upset, try clear liquids and bland, low-fat foods like plain toast or rice. · Drink plenty of fluids (unless your doctor tells you not to). · Don't drink alcohol for 24 hours. Medicines  · Be safe with medicines. Read and follow all instructions on the label. ¨ If the doctor gave you a prescription medicine for pain, take it as prescribed. ¨ If you are not taking a prescription pain medicine, ask your doctor if you can take an over-the-counter medicine.   · If you think your pain medicine is making you sick to your stomach:  ¨ Take your medicine after meals (unless your doctor has told you not to). ¨ Ask your doctor for a different pain medicine. When should you call for help? Call 911 anytime you think you may need emergency care. For example, call if:  · You passed out (lost consciousness). · You have severe trouble breathing. · You have sudden chest pain and shortness of breath, or you cough up blood. · Your groin is very swollen and you have a lump that is getting bigger under your skin where the catheter was put in. Call your doctor now or seek immediate medical care if:  · You have severe pain in your leg, or it becomes cold, pale, blue, tingly, or numb. · You are bleeding from the area where the catheter was put in your artery. · You have a fast-growing, painful lump at the catheter site. · You have signs of infection, such as:  ¨ Increased pain, swelling, warmth, or redness of the skin. ¨ Red streaks leading from the area. ¨ Pus draining from the area. ¨ A fever. These are general instructions for a healthy lifestyle:    No smoking/ No tobacco products/ Avoid exposure to second hand smoke    Surgeon General's Warning:  Quitting smoking now greatly reduces serious risk to your health. Obesity, smoking, and sedentary lifestyle greatly increases your risk for illness    A healthy diet, regular physical exercise & weight monitoring are important for maintaining a healthy lifestyle    You may be retaining fluid if you have a history of heart failure or if you experience any of the following symptoms:  Weight gain of 3 pounds or more overnight or 5 pounds in a week, increased swelling in our hands or feet or shortness of breath while lying flat in bed. Please call your doctor as soon as you notice any of these symptoms; do not wait until your next office visit.     Recognize signs and symptoms of STROKE:    F-face looks uneven    A-arms unable to move or move unevenly    S-speech slurred or non-existent    T-time-call 911 as soon as signs and symptoms begin-DO NOT go       Back to bed or wait to see if you get better-TIME IS BRAIN. Warning Signs of HEART ATTACK     Call 911 if you have these symptoms:   Chest discomfort. Most heart attacks involve discomfort in the center of the chest that lasts more than a few minutes, or that goes away and comes back. It can feel like uncomfortable pressure, squeezing, fullness, or pain.  Discomfort in other areas of the upper body. Symptoms can include pain or discomfort in one or both arms, the back, neck, jaw, or stomach.  Shortness of breath with or without chest discomfort.  Other signs may include breaking out in a cold sweat, nausea, or lightheadedness. Don't wait more than five minutes to call 911 - MINUTES MATTER! Fast action can save your life. Calling 911 is almost always the fastest way to get lifesaving treatment. Emergency Medical Services staff can begin treatment when they arrive -- up to an hour sooner than if someone gets to the hospital by car. The discharge information has been reviewed with the patient and caregiver. The patient and caregiver verbalized understanding. Discharge medications reviewed with the patient and caregiver and appropriate educational materials and side effects teaching were provided.

## 2018-07-05 NOTE — Clinical Note
TRANSFER - IN REPORT:  
 
Verbal report received from: [facility]. Report consisted of patient's Situation, Background, Assessment and  
Recommendations(SBAR). Opportunity for questions and clarification was provided. Assessment completed upon patient's arrival to unit and care assumed. Patient transported with a Cardiac Cath Tech / Patient Care Tech.

## 2018-07-05 NOTE — OP NOTES
[address] [place] [address] Dr  OPERATIVE REPORT    SHAY Dubose  MR#: [id number]  : 1955  ACCOUNT #: [de-identified]   DATE OF SERVICE: 2018    PREOPERATIVE DIAGNOSES:  1. Portage class III claudication of the right lower extremity. 2.  Peripheral arterial disease. 3.  Arterial claudication with atherosclerosis. POSTOPERATIVE DIAGNOSES:  1. Portage class III claudication of the right lower extremity. 2.  Peripheral arterial disease. 3.  Arterial claudication with atherosclerosis. PROCEDURES PERFORMED:   1. Moderate conscious sedation of 29 minutes. 2.  Ultrasound-guided access of left common femoral artery. 3.  Aortogram.  4.  Second order catheterization of selective right lower extremity angiogram.  5.  Third order catheterization and beyond. 6.  Arterial closure using Angio-Seal.    ATTENDING SURGEON:  Marsh Severs, MD     ASSISTANT:  None. CULTURES:  None. SPECIMENS REMOVED:  None. DRAINS:  None. ESTIMATED BLOOD LOSS:  Less than 50 mL. ANESTHESIA:  Moderate conscious sedation of 29 minutes. This was provided by an independent provider, giving the medication per my discretion and monitoring volume status throughout the case. COMPLICATIONS:  None. INDICATION FOR THE PROCEDURE:  The patient is a 39-year-old gentleman with bilateral lower extremity claudication equally. The patient was recommended for angiography. We performed the right lower extremity first.  The patient was given risks and benefits of the procedure, including but not limited to bleeding, infection, damage to adjacent structures, MI, stroke and death, as well as loss of lower extremity and need for further surgery. The patient was understanding of all the risks and underwent the procedure. OPERATIVE FINDINGS:    1. The aorta is patent without any evidence of stenosis. 2.  Bilateral renal arteries are patent without stenosis. 3.  Bilateral common iliac arteries are patent.   The right side does show a 10% narrowing at its proximal portion. The left side is patent without stenosis. 4.  Bilateral internal iliac arteries are patent without stenosis. 5.  Bilateral external iliac arteries were patent without stenosis. RIGHT LOWER EXTREMITY:  1. Common femoral artery is patent without stenosis. 2.  Profunda femoris artery is patent without stenosis. 3.  Superficial femoral artery is occluded. 4.  The above-knee popliteal artery is patent without stenosis. 5.  The below-knee popliteal artery is patent without stenosis. 6.  The anterior tibial artery appears patent without stenosis. 7.  The tibioperoneal trunk artery is patent without stenosis. 8.  The peroneal artery is patent without stenosis. 9.  The posterior tibial artery is patent without stenosis and appears to be the dominant vessel of the calf to the foot. DESCRIPTION OF PROCEDURE:  The patient was correctly identified in the pre-cath area and taken to the cath lab in stable condition. The patient had a preincision time-out prior to the procedure. The patient was prepped and draped in the normal sterile fashion according to CDC guidelines for aseptic technique. We then were able to use an ultrasound to visualize the left common femoral artery. A picture was taken and a copy in the patient's chart. We numbed him appropriately, used 1% lidocaine, took a micropuncture needle and gained access into the artery. Once the needle tip was identified within the artery and there was positive blood return, a Mandril wire was then placed. We then made a small skin incision using an 11 blade, and a 4-Russian micropuncture sheath was placed. Inner dilator and Mandril wire removed. Bentson wire was placed, upsized to a 4-Russian sheath. ContraFlush catheter was then placed. Aortogram was performed with the above findings. We crossed over the aortic bifurcation with ContraFlush catheter and Bentson wire.   With the ContraFlush catheter within the right external iliac artery, a right lower extremity angiogram was performed. With the above findings, we then decided to attempt to cross the chronic total occlusion of the SFA, as there was just a small nubbin of the SFA that was open. We got our 6-Persian Nelson sheath in, heparinized the patient appropriately. Using a Quick-Cross catheter and a Glidewire Advantage wire, we were actually able to get into a subintimal plane. We got down into the distal SFA, and using multiple different wires, we attempted to get past the above and into the true lumen of the above-knee popliteal artery, but we were never able to do this. Decision was then made to conclude the case. All wires and catheters were removed. We shot a dedicated run of out left common femoral artery and showed a good stick for Angio-Seal deployment. A 6-Persian Angio-Seal was then deployed over a Bentson wire with 2 minutes of manual compression with good hemostasis. The patient tolerated the procedure well and was taken to the postoperative care unit in stable condition. RECOMMENDATIONS:  The patient will require either a right femoral to above-knee popliteal artery bypass versus a right superficial remote endarterectomy with angiography, plus or minus bypass. Decision would have to be made with the patient.       Leisa Barthel, MD MAC / LORI  D: 07/05/2018 09:01     T: 07/05/2018 09:36  JOB #: [id number]

## 2018-07-05 NOTE — H&P (VIEW-ONLY)
Surgery History and Physical    Subjective:      Dave Brothers is a 58 y.o. male who presents with bilateral lower extremity claudication R>L. Patient Active Problem List    Diagnosis Date Noted    Right inguinal hernia 10/04/2017    Iliac artery aneurysm, left ([address] 12/12/2016    PAD (peripheral artery disease) ([address] 08/29/2016    Atherosclerosis of native artery of right lower extremity with intermittent claudication ([address] 08/29/2016    Aortic ectasia, abdominal ([address] 08/29/2016    Sepsis(995[address]91) 09/12/2013    Immunocompromised state ([address] 09/12/2013    Anemia, unspecified 09/12/2013    Bone cancer ([address] 09/12/2013     Past Medical History:   Diagnosis Date    Arthritis     Cancer of bone ([address]     COPD     Heart failure ([address]     stents x2 in 2008    Hypertension       Past Surgical History:   Procedure Laterality Date    CARDIAC SURG PROCEDURE UNLIST      COLONOSCOPY N/A 8/15/2016    COLONOSCOPY with polypectomy, biopsy and clip performed by Jose David Parada MD at [facility] ENDOSCOPY    HX CORONARY STENT PLACEMENT  6-5-13    HX HEART CATHETERIZATION  6-5-13    REPAIR ING HERNIA,5+Y/O,REDUCIBL Right 12/05/2017    Dr. Guerrero Luong History   Substance Use Topics    Smoking status: Former Smoker     Packs/day: 2.00     Years: 39.00     Quit date: 8/10/2012    Smokeless tobacco: Never Used    Alcohol use No      Family History   Problem Relation Age of Onset    Stroke Mother     Heart Disease Father       Prior to Admission medications    Medication Sig Start Date End Date Taking? Authorizing Provider   leflunomide (ARAVA) 20 mg tablet Take 20 mg by mouth daily. Yes Historical Provider   predniSONE (DELTASONE) 5 mg tablet Take  by mouth daily. Take 1 to 3 tabs daily   Yes Historical Provider   albuterol (PROVENTIL HFA, VENTOLIN HFA, PROAIR HFA) 90 mcg/actuation inhaler Take 2 Puffs by inhalation every four (4) hours as needed for Wheezing or Shortness of Breath.  Indications: BRONCHOSPASM PREVENTION, Chronic Obstructive Pulmonary Disease 12/10/17  Yes Kingsley Weiss DO   atorvastatin (LIPITOR) 20 mg tablet Take  by mouth daily. Yes Historical Provider   cholecalciferol, VITAMIN D3, (VITAMIN D3) 5,000 unit tab tablet Take 5,000 Units by mouth daily. Yes Historical Provider   aspirin (ASPIRIN) 325 mg tablet Take 325 mg by mouth daily. Yes Akil Ramos MD   esomeprazole (NEXIUM) 20 mg capsule Take 20 mg by mouth daily. Yes Akil Ramos MD   chlorproMAZINE (THORAZINE) 25 mg tablet Take 2 Tabs by mouth three (3) times daily as needed. Indications: INTRACTABLE HICCUPS 12/10/17   Kingsley Weiss DO   oxyCODONE-acetaminophen (PERCOCET) 5-325 mg per tablet Take 1 Tab by mouth every six (6) hours as needed for Pain. Max Daily Amount: 4 Tabs. 12/5/17   Rasheed Neville MD   ibuprofen (MOTRIN) 600 mg tablet Take 1 Tab by mouth every six (6) hours as needed for Pain. 8/29/17   Vinay Gonzalez MD     No Known Allergies      ROS:  Pertinent items are noted in HPI. Unless otherwise mentioned in the HPI. Objective:     Patient Vitals for the past 8 hrs:   Height Weight   06/21/18 0718 6' 1\" (1.854 m) 197 lb (89.4 kg)       No data recorded. Physical Exam:  GENERAL: alert, cooperative, no distress, appears stated age, THROAT & NECK: normal and no erythema or exudates noted. , LUNG: clear to auscultation bilaterally, HEART: regular rate and rhythm, S1, S2 normal, no murmur, click, rub or gallop, ABDOMEN: soft, non-tender. Bowel sounds normal. No masses,  no organomegaly    Labs: No results found for this or any previous visit (from the past 24 hour(s)).     Data Review:    CBC:   Lab Results   Component Value Date/Time    WBC 5.8 02/23/2018 07:46 AM    RBC 5.23 02/23/2018 07:46 AM    HGB 13.5 02/23/2018 07:46 AM    HCT 42.5 02/23/2018 07:46 AM    PLATELET 360 77/37/6976 07:46 AM      BMP:   Lab Results   Component Value Date/Time    Glucose 106 (H) 02/23/2018 07:46 AM    Sodium 140 02/23/2018 07:46 AM    Potassium 4.1 02/23/2018 07:46 AM    Chloride 108 02/23/2018 07:46 AM    CO2 24 02/23/2018 07:46 AM    BUN 8 02/23/2018 07:46 AM    Creatinine 0.92 02/23/2018 07:46 AM    Calcium 9.1 02/23/2018 07:46 AM     Coagulation:   Lab Results   Component Value Date/Time    Prothrombin time 13.1 02/23/2018 07:46 AM    INR 1.0 02/23/2018 07:46 AM    aPTT 27.5 02/23/2018 07:46 AM       Assessment:     Active Problems:    * No active hospital problems.  *      Plan:     Left leg angio first patient today states wants left leg first.    Signed By: Nitza Gabriel MD     June 21, 2018

## 2018-07-05 NOTE — Clinical Note
Interventional guidewire removed The interventional guidewire removed due to: unable to cross lesion.

## 2018-07-05 NOTE — Clinical Note
Vessel: left AA w/ Runoff, iliac, CFA, PFA, SFA, popliteal, PTA, peroneal and DOUGLAS, Power injection to the artery. Single view taken. PSI = 600. Rate of rise = 0.5 sec. Injection rate = 4 mL/sec. Total injected volume = 40 mL.

## 2018-07-05 NOTE — Clinical Note
Sheath #1: Dressed using 4 X 4 and transparent dressing. Site: clean, dry, & intact, no bleeding and no hematoma.

## 2018-07-05 NOTE — IP AVS SNAPSHOT
[address] Patient: Dave Al Jr. MRN: [id number] NDY:[birth year] About your hospitalization You were admitted on:  July 5, 2018 You last received care in the:  [facility] CATH LAB You were discharged on:  July 5, 2018 Why you were hospitalized Your primary diagnosis was:  Not on File Follow-up Information Follow up With Details Comments Contact Info Yesi Jaam MD    [address] [place] [address] 
[phone] Guido Haro MD Follow up in 5 day(s) follow up [address] [place] D 
[facility] 
[phone] Your Scheduled Appointments Wednesday July 25, 2018 11:15 AM EDT HOSPITAL DISCHARGE with Guido Haro MD  
[facility] [address] [place] [address] [facility]  
[phone] Discharge Orders None A check gil indicates which time of day the medication should be taken. My Medications CONTINUE taking these medications Instructions Each Dose to Equal  
 Morning Noon Evening Bedtime  
 albuterol 90 mcg/actuation inhaler Commonly known as:  PROVENTIL HFA, VENTOLIN HFA, PROAIR HFA Your last dose was: Your next dose is: Take 2 Puffs by inhalation every four (4) hours as needed for Wheezing or Shortness of Breath. Indications: BRONCHOSPASM PREVENTION, Chronic Obstructive Pulmonary Disease 2 Puff  
    
   
   
   
  
 aspirin 325 mg tablet Commonly known as:  ASPIRIN Your last dose was: Your next dose is: Take 325 mg by mouth daily. 325 mg  
    
   
   
   
  
 cholecalciferol (VITAMIN D3) 5,000 unit Tab tablet Commonly known as:  VITAMIN D3 Your last dose was: Your next dose is: Take 5,000 Units by mouth daily. 5000 Units leflunomide 20 mg tablet Commonly known as:  Tyron Reveles Your last dose was: Your next dose is: Take 20 mg by mouth daily. 20 mg  
    
   
   
   
  
 LIPITOR 20 mg tablet Generic drug:  atorvastatin Your last dose was: Your next dose is: Take  by mouth daily. NexIUM 20 mg capsule Generic drug:  esomeprazole Your last dose was: Your next dose is: Take 20 mg by mouth daily. 20 mg  
    
   
   
   
  
 predniSONE 5 mg tablet Commonly known as:  Marcelene Im Your last dose was: Your next dose is: Take  by mouth daily. Take 1 to 3 tabs daily Discharge Instructions PERIPHERAL ANGIOGRAPHY DISCHARGE INSTRUCTIONS 1. Check puncture site frequently for swelling or bleeding. If there is any bleeding, lie down and apply pressure over the area with a clean towel or washcloth. Notify your doctor for any redness, swelling, drainage, or oozing from the puncture site. Notify your doctor for any fever or chills. 2. If the extremity becomes cold, numb, or painful call Dr. Mauricio Henderson 3. Activity should be limited for the next 48 hours. Climb stairs as little as possible and avoid any stooping, bending, or strenuous activity for 48 hours. No heavy lifting (anything over 10 pounds) for 7 days. 4. You may resume your usual diet. Drink more fluids than usual. 
5. Have a responsible person drive you home and stay with you for at least 24 hours after your heart catheterization/angiography. 6. You may remove bandage from your Left and Arm in 24 hours. You may shower in 24 hours. No tub baths, hot tubs, or swimming for 1 week. Do not place any lotions, creams, powders, or ointments over puncture site for 1 week. You may place a clean band-aid over the puncture site each day for 5 days. Change daily. Your Recovery Your groin or leg may have a bruise or a small lump where the catheter was put in your groin. The area may feel sore for a day or two after the procedure. You can do light activities around the house but nothing strenuous for several days. After surgery, blood may flow better throughout your leg, which can decrease leg pain, numbness, and cramping. This care sheet gives you a general idea about how long it will take for you to recover. But each person recovers at a different pace. Follow the steps below to feel better as quickly as possible. How can you care for yourself at home? Activity · Do not do strenuous exercise and do not lift anything heavy until your doctor says it is okay. This may be for a day or two. You can walk around the house and do light activity, such as cooking. · You may shower 24 to 48 hours after the procedure, if your doctor okays it. Pat the incision dry. Do not take a bath for 1 week, or until your doctor tells you it is okay. · Go back to regular exercise when your doctor says it is okay. Walking is a good choice. · If you work, you will probably need to take 1 or 2 days off. It depends on the type of work you do and how you feel. Diet · You can eat your normal diet. · Drink plenty of fluids (unless your doctor tells you not to). Medicines · Your doctor will tell you if and when you can restart your medicines. He or she will also give you instructions about taking any new medicines. · If you take blood thinners, such as warfarin (Coumadin), clopidogrel (Plavix), or aspirin, be sure to talk to your doctor. He or she will tell you if and when to start taking those medicines again. Make sure that you understand exactly what your doctor wants you to do. · Be safe with medicines. Take your medicines exactly as prescribed. Call your doctor if you think you are having a problem with your medicine. · Your doctor may prescribe a blood thinner when you go home.  This helps prevent blood clots. Be sure you get instructions about how to take your medicine safely. Blood thinners can cause serious bleeding problems. Care of the catheter site · Keep a bandage over the spot where the catheter was inserted for the first day, or for as long as your doctor recommends. · Put ice or a cold pack on the area for 10 to 20 minutes at a time to help with soreness or swelling. Do this every few hours. Put a thin cloth between the ice and your skin. Follow-up care is a key part of your treatment and safety. Be sure to make and go to all appointments, and call your doctor if you are having problems. It's also a good idea to know your test results and keep a list of the medicines you take. Sedation for a Medical Procedure: Care Instructions Your Care Instructions For a minor procedure or surgery, you will get a sedative to help you relax. This drug will make you sleepy. It is usually given in a vein (by IV). A shot may also be used to numb the area. If you had local anesthesia, you may feel some pain and discomfort as it wears off. If you have pain, don't be afraid to say so. Pain medicine works better if you take it before the pain gets bad. Common side effects from sedation include: · Feeling sleepy. (Your doctors and nurses will make sure you are not too sleepy to go home.) · Nausea and vomiting. This usually does not last long. · Feeling tired. Follow-up care is a key part of your treatment and safety. Be sure to make and go to all appointments, and call your doctor if you are having problems. It's also a good idea to know your test results and keep a list of the medicines you take. How can you care for yourself at home? Activity · Don't do anything for 24 hours that requires attention to detail. It takes time for the medicine effects to completely wear off.  
· For your safety, you should not drive or operate any machinery that could be dangerous until the medicine wears off and you can think clearly and react easily. · Rest when you feel tired. Getting enough sleep will help you recover. Diet · You can eat your normal diet, unless your doctor gives you other instructions. If your stomach is upset, try clear liquids and bland, low-fat foods like plain toast or rice. · Drink plenty of fluids (unless your doctor tells you not to). · Don't drink alcohol for 24 hours. Medicines · Be safe with medicines. Read and follow all instructions on the label. ¨ If the doctor gave you a prescription medicine for pain, take it as prescribed. ¨ If you are not taking a prescription pain medicine, ask your doctor if you can take an over-the-counter medicine. · If you think your pain medicine is making you sick to your stomach: 
¨ Take your medicine after meals (unless your doctor has told you not to). ¨ Ask your doctor for a different pain medicine. When should you call for help? Call 911 anytime you think you may need emergency care. For example, call if: 
· You passed out (lost consciousness). · You have severe trouble breathing. · You have sudden chest pain and shortness of breath, or you cough up blood. · Your groin is very swollen and you have a lump that is getting bigger under your skin where the catheter was put in. Call your doctor now or seek immediate medical care if: 
· You have severe pain in your leg, or it becomes cold, pale, blue, tingly, or numb. · You are bleeding from the area where the catheter was put in your artery. · You have a fast-growing, painful lump at the catheter site. · You have signs of infection, such as: 
¨ Increased pain, swelling, warmth, or redness of the skin. ¨ Red streaks leading from the area. ¨ Pus draining from the area. ¨ A fever. These are general instructions for a healthy lifestyle: No smoking/ No tobacco products/ Avoid exposure to second hand smoke Surgeon General's Warning:  Quitting smoking now greatly reduces serious risk to your health. Obesity, smoking, and sedentary lifestyle greatly increases your risk for illness A healthy diet, regular physical exercise & weight monitoring are important for maintaining a healthy lifestyle You may be retaining fluid if you have a history of heart failure or if you experience any of the following symptoms:  Weight gain of 3 pounds or more overnight or 5 pounds in a week, increased swelling in our hands or feet or shortness of breath while lying flat in bed. Please call your doctor as soon as you notice any of these symptoms; do not wait until your next office visit. Recognize signs and symptoms of STROKE: 
 
F-face looks uneven A-arms unable to move or move unevenly S-speech slurred or non-existent T-time-call 911 as soon as signs and symptoms begin-DO NOT go Back to bed or wait to see if you get better-TIME IS BRAIN. Warning Signs of HEART ATTACK Call 911 if you have these symptoms: 
? Chest discomfort. Most heart attacks involve discomfort in the center of the chest that lasts more than a few minutes, or that goes away and comes back. It can feel like uncomfortable pressure, squeezing, fullness, or pain. ? Discomfort in other areas of the upper body. Symptoms can include pain or discomfort in one or both arms, the back, neck, jaw, or stomach. ? Shortness of breath with or without chest discomfort. ? Other signs may include breaking out in a cold sweat, nausea, or lightheadedness. Don't wait more than five minutes to call [address]! Fast action can save your life. Calling 911 is almost always the fastest way to get lifesaving treatment. Emergency Medical Services staff can begin treatment when they arrive  up to an hour sooner than if someone gets to the hospital by car.   
 
 
The discharge information has been reviewed with the patient and caregiver. The patient and caregiver verbalized understanding. Discharge medications reviewed with the patient and caregiver and appropriate educational materials and side effects teaching were provided. Introducing Armando Anderson As a [employer] patient, I wanted to make you aware of our electronic visit tool called Armando Anderson. [employer] 24/7 allows you to connect within minutes with a medical provider 24 hours a day, seven days a week via a mobile device or tablet or logging into a secure website from your computer. You can access Armando Anderson from anywhere in the [place]. A virtual visit might be right for you when you have a simple condition and feel like you just dont want to get out of bed, or cant get away from work for an appointment, when your regular [employer] provider is not available (evenings, weekends or holidays), or when youre out of town and need minor care. Electronic visits cost only $49 and if the [employer] 24/7 provider determines a prescription is needed to treat your condition, one can be electronically transmitted to a nearby pharmacy*. Please take a moment to enroll today if you have not already done so. The enrollment process is free and takes just a few minutes. To enroll, please download the [employer] 24/7 edith to your tablet or phone, or visit [email or web address] to enroll on your computer. And, as an [address] [place] [address] patient with a [employer] account, the results of your visits will be scanned into your electronic medical record and your primary care provider will be able to view the scanned results. We urge you to continue to see your regular [employer] provider for your ongoing medical care. And while your primary care provider may not be the one available when you seek a Armando Anderson virtual visit, the peace of mind you get from getting a real diagnosis real time can be priceless. For more information on Armando Anderson, view our Frequently Asked Questions (FAQs) at [email or web address]. Sincerely, 
 
Fred Alfonso MD 
Chief Medical Officer [address] *:  certain medications cannot be prescribed via Armando Anderson Providers Seen During Your Hospitalization Provider Specialty Primary office phone Deidra Bustamante MD Vascular Surgery [phone] Your Primary Care Physician (PCP) Primary Care Physician Office Phone Office Fax Marian Auguste [phone] You are allergic to the following No active allergies Recent Documentation Height Weight BMI Smoking Status 1.829 m 88 kg 26.31 kg/m2 Former Smoker Emergency Contacts Name Discharge Info Relation Home Work Mobile Dave Al III DISCHARGE CAREGIVER [3] Son [22] [phone] [facility] DISCHARGE CAREGIVER [3] Brother [24]   [phone] Ana Hendrix  Sister [23] [phone] Dave Al  Child [2] [phone] Patient Belongings The following personal items are in your possession at time of discharge: 
     Visual Aid: None Please provide this summary of care documentation to your next provider. Signatures-by signing, you are acknowledging that this After Visit Summary has been reviewed with you and you have received a copy. Patient Signature:  ____________________________________________________________ Date:  ____________________________________________________________  
  
Marga Mclean Provider Signature:  ____________________________________________________________ Date:  ____________________________________________________________

## 2018-07-05 NOTE — Clinical Note
MACD (Max Contrast Calc Dose): 
Patient weight (kg) = 88. Creatinine level (mg/dL) = 0.8. Contrast concentration (mg/mL) = 300. MACD = 300 mL.

## 2018-07-05 NOTE — Clinical Note
TRANSFER - OUT REPORT:  
 
Verbal report given to: Haile Denis Rn. Report consisted of patient's Situation, Background, Assessment and  
Recommendations(SBAR). Opportunity for questions and clarification was provided. Patient transported with a Cardiac Cath Tech / Patient Care Tech. Patient transported to: [address]

## 2018-07-05 NOTE — INTERVAL H&P NOTE
H&P Update:  Dave PURCELL Servando Armenta was seen and examined. History and physical has been reviewed. The patient has been examined.  There have been no significant clinical changes since the completion of the originally dated History and Physical.    Signed By: Ros Solano MD     July 5, 2018 7:28 AM

## 2018-07-05 NOTE — PROGRESS NOTES
Cath holding summary    Patient escorted to cath holding from waiting area ambulatory, alert and oriented x 4, voicing no complaints. Changed into gown and placed on monitor. NPO since MN. Lab results, med rec and H&P reviewed on chart. PIV x 1 inserted without difficulty. Family to bedside. 0900 patient arrived to cath holding awake and alert, vital sign stable, left groin site clean dry and intact, with no hematoma present, no C/O pain will continue to monitor. 1100 patient discharged home with family,  vital sign stable, left groin site clean dry and intact, with no hematoma present, no C/O pain.

## 2018-07-05 NOTE — Clinical Note
Interventional guidewire advanced and used as the primary guidewire crosses lesion . New treasure wire

## 2018-07-05 NOTE — Clinical Note
Vessel: left AA w/ Runoff, Power injection to the artery. PSI = 1000. Rate of rise = 0.5 sec. Injection rate = 15 mL/sec. Total injected volume = 30 mL.

## 2018-07-25 ENCOUNTER — OFFICE VISIT (OUTPATIENT)
Dept: VASCULAR SURGERY | Age: 63
End: 2018-07-25

## 2018-07-25 VITALS
SYSTOLIC BLOOD PRESSURE: 110 MMHG | BODY MASS INDEX: 26.28 KG/M2 | RESPIRATION RATE: 16 BRPM | HEART RATE: 92 BPM | DIASTOLIC BLOOD PRESSURE: 80 MMHG | WEIGHT: 194 LBS | HEIGHT: 72 IN

## 2018-07-25 DIAGNOSIS — I73.9 PAD (PERIPHERAL ARTERY DISEASE) (HCC): ICD-10-CM

## 2018-07-25 DIAGNOSIS — I70.213 ATHEROSCLEROSIS OF NATIVE ARTERY OF BOTH LOWER EXTREMITIES WITH INTERMITTENT CLAUDICATION (HCC): Primary | ICD-10-CM

## 2018-07-25 DIAGNOSIS — I77.811 AORTIC ECTASIA, ABDOMINAL (HCC): ICD-10-CM

## 2018-07-25 DIAGNOSIS — I72.3 ILIAC ARTERY ANEURYSM, LEFT (HCC): ICD-10-CM

## 2018-07-25 NOTE — PROGRESS NOTES
Dave PURCELL Servando Armenta Chief Complaint   Patient presents with    Surgical Follow-up       History and Physical    Dave Buck is a 58 y.o. male with bilateral lower extremity claudication. Patient was already able to get a right lower extremity angiogram but through multiple rescheduling we were unable to get his left lower extremity performed. Patient continues to have lifestyle limiting of bilateral lower extremity claudication. Patient is in need of left lower extremity angiography. He has no rest pain no ulcerations. No fevers or chills. Past Medical History:   Diagnosis Date    Arthritis     Cancer of bone ([address]     COPD     Heart failure (HCC)     stents x2 in 2008    Hypertension      Past Surgical History:   Procedure Laterality Date    CARDIAC SURG PROCEDURE UNLIST      COLONOSCOPY N/A 8/15/2016    COLONOSCOPY with polypectomy, biopsy and clip performed by Mariana May MD at [address]   6-5-13     HEART CATHETERIZATION  6-5-13    REPAIR ING HERNIA,5+Y/O,REDUCIBL Right 12/05/2017    Dr. Madeleine Rios     Patient Active Problem List   Diagnosis Code    Sepsis(995.91) A41.9    Immunocompromised state ([address] D84.9    Anemia, unspecified D64.9    Bone cancer ([address] C41.9    PAD (peripheral artery disease) ([address] I73.9    Aortic ectasia, abdominal ([address] I77.811    Iliac artery aneurysm, left ([address] I72.3    Right inguinal hernia K40.90    Atherosclerosis of native artery of both lower extremities with intermittent claudication (HCC) I70.213     Current Outpatient Prescriptions   Medication Sig Dispense Refill    leflunomide (ARAVA) 20 mg tablet Take 20 mg by mouth daily.  predniSONE (DELTASONE) 5 mg tablet Take  by mouth daily. Take 1 to 3 tabs daily      albuterol (PROVENTIL HFA, VENTOLIN HFA, PROAIR HFA) 90 mcg/actuation inhaler Take 2 Puffs by inhalation every four (4) hours as needed for Wheezing or Shortness of Breath.  Indications: BRONCHOSPASM PREVENTION, Chronic Obstructive Pulmonary Disease 1 Inhaler 0    atorvastatin (LIPITOR) 20 mg tablet Take  by mouth daily.  cholecalciferol, VITAMIN D3, (VITAMIN D3) 5,000 unit tab tablet Take 5,000 Units by mouth daily.  aspirin (ASPIRIN) 325 mg tablet Take 325 mg by mouth daily.  esomeprazole (NEXIUM) 20 mg capsule Take 20 mg by mouth daily. No Known Allergies  Social History     Social History    Marital status:      Spouse name: N/A    Number of children: N/A    Years of education: N/A     Occupational History    Not on file. Social History Main Topics    Smoking status: Former Smoker     Packs/day: 2.00     Years: 39.00     Quit date: 8/10/2012    Smokeless tobacco: Never Used    Alcohol use No    Drug use: No    Sexual activity: No     Other Topics Concern    Not on file     Social History Narrative      Family History   Problem Relation Age of Onset    Stroke Mother     Heart Disease Father        Physical Exam:    Visit Vitals    /80 (BP 1 Location: Left arm, BP Patient Position: Sitting)    Pulse 92    Resp 16    Ht 6' (1.829 m)    Wt 194 lb (88 kg)    BMI 26.31 kg/m2      General: Well-appearing male in no acute distress  HEENT: EOMI no scleral icterus is noted  Pulmonary: No increased work of breathing is noted  Extremities: Warm and perfused bilaterally nonpalpable distal pulses bilaterally  Neuro: Cranial nerves II through XII grossly intact    Impression and Plan:  Queenie Mendez is a 58 y.o. male in need of left lower extremity angiography. Patient is also in need of a right femoral to above-knee popliteal artery bypass. We will perform vein mapping of bilateral lower extremities move forward with a left lower extremity angiogram with possible intervention. After the angiogram will ring back to clinic and we can discuss further needs for the left lower extremity versus scheduling for bypass on the right leg.     We reviewed the plan with the patient and the patient understands. We also gave the patient appropriate instructions on their disease process and when to call back. Follow-up Disposition: Not on [facility], [place]    PLEASE NOTE:  This document has been produced using voice recognition software. Unrecognized errors in transcription may be present.

## 2018-07-25 NOTE — PROGRESS NOTES
1. Have you been to an emergency room or urgent care clinic since your last visit? no    Hospitalized since your last visit? If yes, where, when, and reason for visit? no  2. Have you seen or consulted any other health care providers outside of the [facility] since your last visit including any procedures, health maintenance items.  If yes, where, when and reason for visit? no

## 2018-07-25 NOTE — MR AVS SNAPSHOT
[address] [facility] [address] 
[phone] Patient: Dave Al Jr. MRN: [id number] MAIA:[id number] Visit Information Date & Time Provider Department Dept. Phone Encounter #  
 7/25/2018 11:15 AM Patricia Nath MD [facility] [phone] Follow-up Instructions Follow-up and Disposition History Your Appointments 8/27/2018  9:00 AM  
HOSPITAL DISCHARGE with [facility] ([address] [facility]) Appt Note: DC LLE angio [address] [facility] [address] [facility] [address]  
[phone] [facility] [address] [facility] Upcoming Health Maintenance Date Due Hepatitis C Screening 1955 Pneumococcal 19-64 Highest Risk (1 of 3 - PCV13) 1/25/1973 DTaP/Tdap/Td series (1 - Tdap) 1/25/1975 FOBT Q 1 YEAR AGE 50-75 1/25/2004 ZOSTER VACCINE AGE 60> 11/25/2013 MEDICARE YEARLY EXAM 3/14/2018 Influenza Age 5 to Adult 8/1/2018 Allergies as of 7/25/2018  Review Complete On: 7/25/2018 By: Patricia Nath MD  
 No Known Allergies Current Immunizations  Reviewed on 2/5/2014 No immunizations on file. Not reviewed this visit You Were Diagnosed With   
  
 Codes Comments Atherosclerosis of native artery of both lower extremities with intermittent claudication ([facility] [address]    -  Primary ICD-10-CM: E91.974 ICD-9-CM: 440.21 PAD (peripheral artery disease) (HCC)     ICD-10-CM: I73.9 ICD-9-CM: 443.9 Iliac artery aneurysm, left (HCC)     ICD-10-CM: I72.3 ICD-9-CM: 037. 2 Aortic ectasia, abdominal ([facility] [address]     ICD-10-CM: A83.108 ICD-9-CM: 447.72 Vitals BP Pulse Resp Height(growth percentile) Weight(growth percentile) BMI  
 110/80 (BP 1 Location: Left arm, BP Patient Position: Sitting) 92 16 6' (1.829 m) 194 lb (88 kg) 26.31 kg/m2 Smoking Status Former Smoker Vitals History BMI and BSA Data Body Mass Index Body Surface Area  
 26.31 kg/m 2 2.11 m 2 Preferred Pharmacy Pharmacy Name Phone [facility] [phone], [address] [phone] Your Updated Medication List  
  
   
This list is accurate as of 7/25/18  1:11 PM.  Always use your most recent med list.  
  
  
  
  
 albuterol 90 mcg/actuation inhaler Commonly known as:  PROVENTIL HFA, VENTOLIN HFA, PROAIR HFA Take 2 Puffs by inhalation every four (4) hours as needed for Wheezing or Shortness of Breath. Indications: BRONCHOSPASM PREVENTION, Chronic Obstructive Pulmonary Disease  
  
 aspirin 325 mg tablet Commonly known as:  ASPIRIN Take 325 mg by mouth daily. cholecalciferol (VITAMIN D3) 5,000 unit Tab tablet Commonly known as:  VITAMIN D3 Take 5,000 Units by mouth daily. leflunomide 20 mg tablet Commonly known as:  Ala [place]cy Take 20 mg by mouth daily. LIPITOR 20 mg tablet Generic drug:  atorvastatin Take  by mouth daily. NexIUM 20 mg capsule Generic drug:  esomeprazole Take 20 mg by mouth daily. predniSONE 5 mg tablet Commonly known as:  [employer] Take  by mouth daily. Take 1 to 3 tabs daily To-Do List   
 08/08/2018 Vascular/US:  DUPLEX LOWER EXT VEIN MAP BILAT Please provide this summary of care documentation to your next provider. Your primary care clinician is listed as [phone]. If you have any questions after today's visit, please call [phone].

## 2018-08-09 ENCOUNTER — HOSPITAL ENCOUNTER (OUTPATIENT)
Age: 63
Setting detail: OUTPATIENT SURGERY
Discharge: HOME | End: 2018-08-09
Attending: SURGERY | Admitting: SURGERY
Payer: MEDICARE

## 2018-08-09 VITALS
DIASTOLIC BLOOD PRESSURE: 68 MMHG | HEART RATE: 68 BPM | SYSTOLIC BLOOD PRESSURE: 103 MMHG | OXYGEN SATURATION: 95 % | WEIGHT: 190 LBS | BODY MASS INDEX: 25.77 KG/M2

## 2018-08-09 DIAGNOSIS — I70.213 ATHEROSCLER OF NATIVE ARTERY OF BOTH LEGS WITH INTERMIT CLAUDICATION (HCC): ICD-10-CM

## 2018-08-09 DIAGNOSIS — I73.9 PERIPHERAL VASCULAR DISEASE, UNSPECIFIED (HCC): ICD-10-CM

## 2018-08-09 LAB
ANION GAP BLD CALC-SCNC: 17 MMOL/L (ref 10–20)
BUN BLD-MCNC: 6 MG/DL (ref 7–18)
CA-I BLD-MCNC: 1.16 MMOL/L (ref 1.12–1.32)
CHLORIDE BLD-SCNC: 104 MMOL/L (ref 100–108)
CO2 BLD-SCNC: 24 MMOL/L (ref 19–24)
CREAT UR-MCNC: 0.8 MG/DL (ref 0.6–1.3)
GLUCOSE BLD STRIP.AUTO-MCNC: 100 MG/DL (ref 74–106)
HCT VFR BLD CALC: 43 % (ref 36–49)
HGB BLD-MCNC: 14.6 G/DL (ref 12–16)
POTASSIUM BLD-SCNC: 4.2 MMOL/L (ref 3.5–5.5)
SODIUM BLD-SCNC: 140 MMOL/L (ref 136–145)

## 2018-08-09 PROCEDURE — C1724 CATH, TRANS ATHEREC,ROTATION: HCPCS | Performed by: SURGERY

## 2018-08-09 PROCEDURE — 99153 MOD SED SAME PHYS/QHP EA: CPT | Performed by: SURGERY

## 2018-08-09 PROCEDURE — C1894 INTRO/SHEATH, NON-LASER: HCPCS | Performed by: SURGERY

## 2018-08-09 PROCEDURE — 75710 ARTERY X-RAYS ARM/LEG: CPT | Performed by: SURGERY

## 2018-08-09 PROCEDURE — C1769 GUIDE WIRE: HCPCS | Performed by: SURGERY

## 2018-08-09 PROCEDURE — C1725 CATH, TRANSLUMIN NON-LASER: HCPCS | Performed by: SURGERY

## 2018-08-09 PROCEDURE — 76937 US GUIDE VASCULAR ACCESS: CPT | Performed by: SURGERY

## 2018-08-09 PROCEDURE — 77030004530 HC CATH ANGI DX IMGR BSC -A: Performed by: SURGERY

## 2018-08-09 PROCEDURE — 74011250636 HC RX REV CODE- 250/636

## 2018-08-09 PROCEDURE — 99152 MOD SED SAME PHYS/QHP 5/>YRS: CPT | Performed by: SURGERY

## 2018-08-09 PROCEDURE — 80047 BASIC METABLC PNL IONIZED CA: CPT

## 2018-08-09 PROCEDURE — C1887 CATHETER, GUIDING: HCPCS | Performed by: SURGERY

## 2018-08-09 PROCEDURE — 77030028837 HC SYR ANGI PWR INJ COEU -A: Performed by: SURGERY

## 2018-08-09 PROCEDURE — 74011636320 HC RX REV CODE- 636/320: Performed by: SURGERY

## 2018-08-09 PROCEDURE — C1760 CLOSURE DEV, VASC: HCPCS | Performed by: SURGERY

## 2018-08-09 PROCEDURE — 77030013744: Performed by: SURGERY

## 2018-08-09 PROCEDURE — 74011250636 HC RX REV CODE- 250/636: Performed by: SURGERY

## 2018-08-09 PROCEDURE — 77030013515 HC DEV INFL ANGI BSC -B: Performed by: SURGERY

## 2018-08-09 PROCEDURE — 37225 HC ARTHERC FEMPOP UNI +/-PTA: CPT | Performed by: SURGERY

## 2018-08-09 RX ORDER — DIPHENHYDRAMINE HYDROCHLORIDE 50 MG/ML
12.5 INJECTION, SOLUTION INTRAMUSCULAR; INTRAVENOUS
Status: DISCONTINUED | OUTPATIENT
Start: 2018-08-09 | End: 2018-08-09 | Stop reason: HOSPADM

## 2018-08-09 RX ORDER — VERAPAMIL HYDROCHLORIDE 2.5 MG/ML
INJECTION, SOLUTION INTRAVENOUS
Status: DISCONTINUED
Start: 2018-08-09 | End: 2018-08-09 | Stop reason: HOSPADM

## 2018-08-09 RX ORDER — ONDANSETRON 2 MG/ML
4 INJECTION INTRAMUSCULAR; INTRAVENOUS
Status: DISCONTINUED | OUTPATIENT
Start: 2018-08-09 | End: 2018-08-09 | Stop reason: HOSPADM

## 2018-08-09 RX ORDER — FENTANYL CITRATE 50 UG/ML
INJECTION, SOLUTION INTRAMUSCULAR; INTRAVENOUS
Status: DISCONTINUED
Start: 2018-08-09 | End: 2018-08-09 | Stop reason: HOSPADM

## 2018-08-09 RX ORDER — HEPARIN SODIUM 1000 [USP'U]/ML
INJECTION, SOLUTION INTRAVENOUS; SUBCUTANEOUS AS NEEDED
Status: DISCONTINUED | OUTPATIENT
Start: 2018-08-09 | End: 2018-08-09 | Stop reason: HOSPADM

## 2018-08-09 RX ORDER — LIDOCAINE HYDROCHLORIDE 10 MG/ML
INJECTION, SOLUTION EPIDURAL; INFILTRATION; INTRACAUDAL; PERINEURAL AS NEEDED
Status: DISCONTINUED | OUTPATIENT
Start: 2018-08-09 | End: 2018-08-09 | Stop reason: HOSPADM

## 2018-08-09 RX ORDER — FENTANYL CITRATE 50 UG/ML
INJECTION, SOLUTION INTRAMUSCULAR; INTRAVENOUS AS NEEDED
Status: DISCONTINUED | OUTPATIENT
Start: 2018-08-09 | End: 2018-08-09 | Stop reason: HOSPADM

## 2018-08-09 RX ORDER — SODIUM CHLORIDE 0.9 % (FLUSH) 0.9 %
5-10 SYRINGE (ML) INJECTION AS NEEDED
Status: DISCONTINUED | OUTPATIENT
Start: 2018-08-09 | End: 2018-08-09 | Stop reason: HOSPADM

## 2018-08-09 RX ORDER — MIDAZOLAM HYDROCHLORIDE 1 MG/ML
INJECTION, SOLUTION INTRAMUSCULAR; INTRAVENOUS AS NEEDED
Status: DISCONTINUED | OUTPATIENT
Start: 2018-08-09 | End: 2018-08-09 | Stop reason: HOSPADM

## 2018-08-09 RX ORDER — SODIUM CHLORIDE 0.9 % (FLUSH) 0.9 %
5-10 SYRINGE (ML) INJECTION EVERY 8 HOURS
Status: DISCONTINUED | OUTPATIENT
Start: 2018-08-09 | End: 2018-08-09 | Stop reason: HOSPADM

## 2018-08-09 RX ORDER — MORPHINE SULFATE 10 MG/ML
1 INJECTION, SOLUTION INTRAMUSCULAR; INTRAVENOUS
Status: DISCONTINUED | OUTPATIENT
Start: 2018-08-09 | End: 2018-08-09 | Stop reason: HOSPADM

## 2018-08-09 RX ORDER — OXYCODONE AND ACETAMINOPHEN 5; 325 MG/1; MG/1
1 TABLET ORAL
Status: DISCONTINUED | OUTPATIENT
Start: 2018-08-09 | End: 2018-08-09 | Stop reason: HOSPADM

## 2018-08-09 RX ORDER — MIDAZOLAM HYDROCHLORIDE 1 MG/ML
INJECTION, SOLUTION INTRAMUSCULAR; INTRAVENOUS
Status: DISCONTINUED
Start: 2018-08-09 | End: 2018-08-09 | Stop reason: HOSPADM

## 2018-08-09 RX ORDER — HEPARIN SODIUM 200 [USP'U]/100ML
INJECTION, SOLUTION INTRAVENOUS
Status: DISCONTINUED | OUTPATIENT
Start: 2018-08-09 | End: 2018-08-09 | Stop reason: HOSPADM

## 2018-08-09 RX ORDER — HEPARIN SODIUM 1000 [USP'U]/ML
INJECTION, SOLUTION INTRAVENOUS; SUBCUTANEOUS
Status: DISCONTINUED
Start: 2018-08-09 | End: 2018-08-09 | Stop reason: HOSPADM

## 2018-08-09 RX ORDER — ACETAMINOPHEN 325 MG/1
650 TABLET ORAL
Status: DISCONTINUED | OUTPATIENT
Start: 2018-08-09 | End: 2018-08-09 | Stop reason: HOSPADM

## 2018-08-09 RX ORDER — LIDOCAINE HYDROCHLORIDE 10 MG/ML
INJECTION, SOLUTION EPIDURAL; INFILTRATION; INTRACAUDAL; PERINEURAL
Status: DISCONTINUED
Start: 2018-08-09 | End: 2018-08-09 | Stop reason: HOSPADM

## 2018-08-09 NOTE — Clinical Note
Sheath #1: Dressed using gauze and transparent dressing. Site: clean, dry, & intact, no bleeding and no hematoma.

## 2018-08-09 NOTE — Clinical Note
Peripheral Lesion 1, atherectomy performed: rotational. Pass RPM = 60. Duration = 30 sec.  Pass # = 1.

## 2018-08-09 NOTE — Clinical Note
Peripheral Lesion 1, atherectomy performed: rotational. Pass RPM = 90. Duration = 30 sec.  Pass # = 5.

## 2018-08-09 NOTE — H&P (VIEW-ONLY)
Dave PURCELL Servando Armenta Chief Complaint   Patient presents with    Surgical Follow-up       History and Physical    Dave Peña. is a 58 y.o. male with bilateral lower extremity claudication. Patient was already able to get a right lower extremity angiogram but through multiple rescheduling we were unable to get his left lower extremity performed. Patient continues to have lifestyle limiting of bilateral lower extremity claudication. Patient is in need of left lower extremity angiography. He has no rest pain no ulcerations. No fevers or chills. Past Medical History:   Diagnosis Date    Arthritis     Cancer of bone ([address]     COPD     Heart failure (HCC)     stents x2 in 2008    Hypertension      Past Surgical History:   Procedure Laterality Date    CARDIAC SURG PROCEDURE UNLIST      COLONOSCOPY N/A 8/15/2016    COLONOSCOPY with polypectomy, biopsy and clip performed by Christos Morrow MD at [address]   6-5-13     HEART CATHETERIZATION  6-5-13    REPAIR ING HERNIA,5+Y/O,REDUCIBL Right 12/05/2017    Dr. Balderarma Iha     Patient Active Problem List   Diagnosis Code    Sepsis(995.91) A41.9    Immunocompromised state ([address] D84.9    Anemia, unspecified D64.9    Bone cancer ([address] C41.9    PAD (peripheral artery disease) ([address] I73.9    Aortic ectasia, abdominal ([address] I77.811    Iliac artery aneurysm, left ([address] I72.3    Right inguinal hernia K40.90    Atherosclerosis of native artery of both lower extremities with intermittent claudication (HCC) I70.213     Current Outpatient Prescriptions   Medication Sig Dispense Refill    leflunomide (ARAVA) 20 mg tablet Take 20 mg by mouth daily.  predniSONE (DELTASONE) 5 mg tablet Take  by mouth daily. Take 1 to 3 tabs daily      albuterol (PROVENTIL HFA, VENTOLIN HFA, PROAIR HFA) 90 mcg/actuation inhaler Take 2 Puffs by inhalation every four (4) hours as needed for Wheezing or Shortness of Breath.  Indications: BRONCHOSPASM PREVENTION, Chronic Obstructive Pulmonary Disease 1 Inhaler 0    atorvastatin (LIPITOR) 20 mg tablet Take  by mouth daily.  cholecalciferol, VITAMIN D3, (VITAMIN D3) 5,000 unit tab tablet Take 5,000 Units by mouth daily.  aspirin (ASPIRIN) 325 mg tablet Take 325 mg by mouth daily.  esomeprazole (NEXIUM) 20 mg capsule Take 20 mg by mouth daily. No Known Allergies  Social History     Social History    Marital status:      Spouse name: N/A    Number of children: N/A    Years of education: N/A     Occupational History    Not on file. Social History Main Topics    Smoking status: Former Smoker     Packs/day: 2.00     Years: 39.00     Quit date: 8/10/2012    Smokeless tobacco: Never Used    Alcohol use No    Drug use: No    Sexual activity: No     Other Topics Concern    Not on file     Social History Narrative      Family History   Problem Relation Age of Onset    Stroke Mother     Heart Disease Father        Physical Exam:    Visit Vitals    /80 (BP 1 Location: Left arm, BP Patient Position: Sitting)    Pulse 92    Resp 16    Ht 6' (1.829 m)    Wt 194 lb (88 kg)    BMI 26.31 kg/m2      General: Well-appearing male in no acute distress  HEENT: EOMI no scleral icterus is noted  Pulmonary: No increased work of breathing is noted  Extremities: Warm and perfused bilaterally nonpalpable distal pulses bilaterally  Neuro: Cranial nerves II through XII grossly intact    Impression and Plan:  Ewelina Mccann is a 58 y.o. male in need of left lower extremity angiography. Patient is also in need of a right femoral to above-knee popliteal artery bypass. We will perform vein mapping of bilateral lower extremities move forward with a left lower extremity angiogram with possible intervention. After the angiogram will ring back to clinic and we can discuss further needs for the left lower extremity versus scheduling for bypass on the right leg.     We reviewed the plan with the patient and the patient understands. We also gave the patient appropriate instructions on their disease process and when to call back. Follow-up Disposition: Not on [facility], [place]    PLEASE NOTE:  This document has been produced using voice recognition software. Unrecognized errors in transcription may be present.

## 2018-08-09 NOTE — Clinical Note
Peripheral Lesion 1, atherectomy performed: rotational. Pass RPM = 60. Duration = 30 sec. Pass # = 10.

## 2018-08-09 NOTE — Clinical Note
Peripheral Lesion 1, atherectomy performed: rotational. Pass RPM = 120. Duration = 30 sec. Pass # = 6.

## 2018-08-09 NOTE — Clinical Note
Peripheral Lesion 1, atherectomy performed: rotational. Pass RPM = 90. Duration = 30 sec.  Pass # = 1.

## 2018-08-09 NOTE — Clinical Note
TRANSFER - IN REPORT:  
 
Verbal report received from: Mary Jensen RN. Norberto Willard Report consisted of patient's Situation, Background, Assessment and  
Recommendations(SBAR). Opportunity for questions and clarification was provided. Assessment completed upon patient's arrival to unit and care assumed. Patient transported with a Cardiac Cath Tech / Patient Care Tech.

## 2018-08-09 NOTE — PROGRESS NOTES
Cath holding summary    Patient escorted to cath holding from waiting area ambulatory, alert and oriented x 4, voicing no complaints. Changed into gown and placed on monitor. Lab results, med rec and H&P reviewed on chart. PIV x 1 inserted without difficulty. Family to bedside.

## 2018-08-09 NOTE — Clinical Note
Peripheral Lesion 1, atherectomy performed: rotational. Pass RPM = 90. Duration = 30 sec.  Pass # = 8.

## 2018-08-09 NOTE — DISCHARGE INSTRUCTIONS
Angiogram: What to Expect at Home  Your Recovery  An angiogram is an X-ray test that uses dye and a camera to take pictures of the blood flow in an artery or a vein. The doctor inserted a thin, flexible tube (catheter) into a blood vessel in your groin. In some cases, the catheter is placed in a blood vessel in the arm. An angiogram is done for many reasons. For example, you may have an angiogram to find the source of bleeding, such as an ulcer. Or it may be done to look for blocked blood vessels in your lungs. After an angiogram, your groin or arm may have a bruise and feel sore for a day or two. You can do light activities around the house but nothing strenuous for several days. Your doctor may give you specific instructions on when you can do your normal activities again, such as driving and going back to work. This care sheet gives you a general idea about how long it will take for you to recover. But each person recovers at a different pace. Follow the steps below to feel better as quickly as possible. How can you care for yourself at home? Activity    · Do not do strenuous exercise and do not lift, pull, or push anything heavy until your doctor says it is okay. This may be for a day or two. You can walk around the house and do light activity, such as cooking.     · You may shower 24 to 48 hours after the procedure, if your doctor okays it. Pat the incision dry. Do not take a bath for 1 week, or until your doctor tells you it is okay.     · If the catheter was placed in your groin, try not to walk up stairs for the first couple of days.     · If the catheter was placed in your arm near your wrist, do not bend your wrist deeply for the first couple of days. Be careful using your hand to get into and out of a chair or bed.     · If your doctor recommends it, get more exercise. Walking is a good choice. Bit by bit, increase the amount you walk every day.  Try for at least 30 minutes on most days of the week.   Diet    · Drink plenty of fluids to help your body flush out the dye. If you have kidney, heart, or liver disease and have to limit fluids, talk with your doctor before you increase the amount of fluids you drink.     · You can eat your normal diet. If your stomach is upset, try bland, low-fat foods like plain rice, broiled chicken, toast, and yogurt. Medicines    · Be safe with medicines. Read and follow all instructions on the label. ¨ If the doctor gave you a prescription medicine for pain, take it as prescribed. ¨ If you are not taking a prescription pain medicine, ask your doctor if you can take an over-the-counter medicine.     · If you take blood thinners, such as warfarin (Coumadin), clopidogrel (Plavix), or aspirin, be sure to talk to your doctor. He or she will tell you if and when to start taking those medicines again. Make sure that you understand exactly what your doctor wants you to do.     · Your doctor will tell you if and when you can restart your medicines. He or she will also give you instructions about taking any new medicines.    Care of the catheter site    · You will have a dressing over the cut (incision). A dressing helps the incision heal and protects it. Your doctor will tell you how to take care of this.     · Put ice or a cold pack on the area for 10 to 20 minutes at a time to help with soreness or swelling. Put a thin cloth between the ice and your skin. Follow-up care is a key part of your treatment and safety. Be sure to make and go to all appointments, and call your doctor if you are having problems. It's also a good idea to know your test results and keep a list of the medicines you take. When should you call for help? Call 911 anytime you think you may need emergency care.  For example, call if:    · You passed out (lost consciousness).     · You have severe trouble breathing.     · You have sudden chest pain and shortness of breath, or you cough up blood.    Call your doctor now or seek immediate medical care if:    · You are bleeding from the area where the catheter was put in your artery.     · You have a fast-growing, painful lump at the catheter site.     · You have signs of infection, such as:  ¨ Increased pain, swelling, warmth, or redness. ¨ Red streaks leading from the incision. ¨ Pus draining from the incision. ¨ A fever.    Watch closely for any changes in your health, and be sure to contact your doctor if:    · You don't get better as expected. Where can you learn more? Go to http://messi-thang.info/. Enter E301 in the search box to learn more about \"Angiogram: What to Expect at Home. \"  Current as of: September 10, 2017  Content Version: 11.7  © 0699-4608 [employer] Care instructions adapted under license by [employer] (which disclaims liability or warranty for this information). If you have questions about a medical condition or this instruction, always ask your healthcare professional. [address] any warranty or liability for your use of this information.

## 2018-08-09 NOTE — Clinical Note
Contrast Dose Calculator:  
Patient's age: 58.  
Patient's sex: Male. Patient weight (kg) = 86.2. Creatinine level (mg/dL) = 0.8. Creatinine clearance (mL/min): 114. Contrast concentration (mg/mL) = 300. MACD = 300 mL. Max Contrast dose per Creatinine Cl calculator = 256.5 mL.

## 2018-08-09 NOTE — Clinical Note
Peripheral Lesion 1, atherectomy performed: rotational. Pass RPM = 60. Duration = 25 sec. Pass # = 4.

## 2018-08-09 NOTE — Clinical Note
Vessel: left CFA, PFA, SFA, popliteal, PTA, peroneal and DOUGLAS, Power injection to the artery. Single view taken. PSI = 600. Rate of rise = 0.5 sec. Injection rate = 4 mL/sec. Total injected volume = 40 mL.

## 2018-08-09 NOTE — Clinical Note
Peripheral Lesion 1, atherectomy performed: rotational. Pass RPM = 120. Duration = 30 sec. Pass # = 3.

## 2018-08-09 NOTE — Clinical Note
Peripheral Lesion 1, atherectomy performed: rotational. Pass RPM = 60. Duration = 30 sec. Pass # = 7.

## 2018-08-09 NOTE — Clinical Note
Peripheral Lesion 1, atherectomy performed: rotational. Pass RPM = 120. Duration = 30 sec.  Pass # = 9.

## 2018-08-09 NOTE — Clinical Note
TRANSFER - OUT REPORT:  
 
Verbal report given to: Florina Kim RN. [place] Report consisted of patient's Situation, Background, Assessment and  
Recommendations(SBAR). Opportunity for questions and clarification was provided. Patient transported with a Cardiac Cath Tech / Patient Care Tech. Patient transported to: [address] [place]

## 2018-08-09 NOTE — INTERVAL H&P NOTE
H&P Update:  Dave PURCELL Servando Armenta was seen and examined. History and physical has been reviewed. The patient has been examined.  There have been no significant clinical changes since the completion of the originally dated History and Physical.    Signed By: Michael Garcia MD     August 9, 2018 12:56 PM

## 2018-08-09 NOTE — IP AVS SNAPSHOT
[address] [facility] 
 
 
 [address] Patient: Dave Al Jr. MRN: [id number] BCY:0/31/1892 About your hospitalization You were admitted on:  August 9, 2018 You last received care in the:  [facility] CATH LAB You were discharged on:  August 9, 2018 Why you were hospitalized Your primary diagnosis was:  Not on File Follow-up Information Follow up With Details Comments Contact Info Ovidio Bacon MD   [address] [place] [address] 
[phone] Your Scheduled Appointments Wednesday September 05, 2018 11:30 AM EDT HOSPITAL DISCHARGE with 800 [facility] ([address] [facility]) [facility] [address] 200 [facility]  
[phone] Discharge Orders None A check gil indicates which time of day the medication should be taken. My Medications CONTINUE taking these medications Instructions Each Dose to Equal  
 Morning Noon Evening Bedtime  
 albuterol 90 mcg/actuation inhaler Commonly known as:  PROVENTIL HFA, VENTOLIN HFA, PROAIR HFA Your last dose was: Your next dose is: Take 2 Puffs by inhalation every four (4) hours as needed for Wheezing or Shortness of Breath. Indications: BRONCHOSPASM PREVENTION, Chronic Obstructive Pulmonary Disease 2 Puff  
    
   
   
   
  
 aspirin 325 mg tablet Commonly known as:  ASPIRIN Your last dose was: Your next dose is: Take 325 mg by mouth daily. 325 mg  
    
   
   
   
  
 cholecalciferol (VITAMIN D3) 5,000 unit Tab tablet Commonly known as:  VITAMIN D3 Your last dose was: Your next dose is: Take 5,000 Units by mouth daily. 5000 Units  
    
   
   
   
  
 leflunomide 20 mg tablet Commonly known as:  Gavin Ill Your last dose was: Your next dose is: Take 20 mg by mouth daily. 20 mg  
    
   
   
   
  
 LIPITOR 20 mg tablet Generic drug:  atorvastatin Your last dose was: Your next dose is: Take  by mouth daily. NexIUM 20 mg capsule Generic drug:  esomeprazole Your last dose was: Your next dose is: Take 20 mg by mouth daily. 20 mg  
    
   
   
   
  
 predniSONE 5 mg tablet Commonly known as:  Berle Pouch Your last dose was: Your next dose is: Take  by mouth daily. Take 1 to 3 tabs daily Discharge Instructions Angiogram: What to Expect at [facility] Your Recovery An angiogram is an X-ray test that uses dye and a camera to take pictures of the blood flow in an artery or a vein. The doctor inserted a thin, flexible tube (catheter) into a blood vessel in your groin. In some cases, the catheter is placed in a blood vessel in the arm. An angiogram is done for many reasons. For example, you may have an angiogram to find the source of bleeding, such as an ulcer. Or it may be done to look for blocked blood vessels in your lungs. After an angiogram, your groin or arm may have a bruise and feel sore for a day or two. You can do light activities around the house but nothing strenuous for several days. Your doctor may give you specific instructions on when you can do your normal activities again, such as driving and going back to work. This care sheet gives you a general idea about how long it will take for you to recover. But each person recovers at a different pace. Follow the steps below to feel better as quickly as possible. How can you care for yourself at home? Activity 
  · Do not do strenuous exercise and do not lift, pull, or push anything heavy until your doctor says it is okay. This may be for a day or two. You can walk around the house and do light activity, such as cooking.   · You may shower 24 to 48 hours after the procedure, if your doctor okays it. Pat the incision dry. Do not take a bath for 1 week, or until your doctor tells you it is okay.  
  · If the catheter was placed in your groin, try not to walk up stairs for the first couple of days.  
  · If the catheter was placed in your arm near your wrist, do not bend your wrist deeply for the first couple of days. Be careful using your hand to get into and out of a chair or bed.  
  · If your doctor recommends it, get more exercise. Walking is a good choice. Bit by bit, increase the amount you walk every day. Try for at least 30 minutes on most days of the week. Diet 
  · Drink plenty of fluids to help your body flush out the dye. If you have kidney, heart, or liver disease and have to limit fluids, talk with your doctor before you increase the amount of fluids you drink.  
  · You can eat your normal diet. If your stomach is upset, try bland, low-fat foods like plain rice, broiled chicken, toast, and yogurt. Medicines 
  · Be safe with medicines. Read and follow all instructions on the label. ¨ If the doctor gave you a prescription medicine for pain, take it as prescribed. ¨ If you are not taking a prescription pain medicine, ask your doctor if you can take an over-the-counter medicine.  
  · If you take blood thinners, such as warfarin (Coumadin), clopidogrel (Plavix), or aspirin, be sure to talk to your doctor. He or she will tell you if and when to start taking those medicines again. Make sure that you understand exactly what your doctor wants you to do.  
  · Your doctor will tell you if and when you can restart your medicines. He or she will also give you instructions about taking any new medicines.  
 Care of the catheter site 
  · You will have a dressing over the cut (incision). A dressing helps the incision heal and protects it. Your doctor will tell you how to take care of this.   · Put ice or a cold pack on the area for 10 to 20 minutes at a time to help with soreness or swelling. Put a thin cloth between the ice and your skin. Follow-up care is a key part of your treatment and safety. Be sure to make and go to all appointments, and call your doctor if you are having problems. It's also a good idea to know your test results and keep a list of the medicines you take. When should you call for help? Call 911 anytime you think you may need emergency care. For example, call if: 
  · You passed out (lost consciousness).  
  · You have severe trouble breathing.  
  · You have sudden chest pain and shortness of breath, or you cough up blood.  
 Call your doctor now or seek immediate medical care if: 
  · You are bleeding from the area where the catheter was put in your artery.  
  · You have a fast-growing, painful lump at the catheter site.  
  · You have signs of infection, such as: 
¨ Increased pain, swelling, warmth, or redness. ¨ Red streaks leading from the incision. ¨ Pus draining from the incision. ¨ A fever.  
 Watch closely for any changes in your health, and be sure to contact your doctor if: 
  · You don't get better as expected. Where can you learn more? Go to http://messi-thang.info/. Enter W409 in the search box to learn more about \"Angiogram: What to Expect at Home. \" Current as of: September 10, 2017 Content Version: 11.7 © 7910-7188 [employer]. Care instructions adapted under license by [employer] (which disclaims liability or warranty for this information). If you have questions about a medical condition or this instruction, always ask your healthcare professional. [place] [address] any warranty or liability for your use of this information. Introducing Armando Anderson As a [employer] patient, I wanted to make you aware of our electronic visit tool called Armando Anderson. [employer] allows you to connect within minutes with a medical provider 24 hours a day, seven days a week via a mobile device or tablet or logging into a secure website from your computer. You can access [employer] from anywhere in the [place]. A virtual visit might be right for you when you have a simple condition and feel like you just dont want to get out of bed, or cant get away from work for an appointment, when your regular [employer] provider is not available (evenings, weekends or holidays), or when youre out of town and need minor care. Electronic visits cost only $49 and if the [employer]) provider determines a prescription is needed to treat your condition, one can be electronically transmitted to a nearby pharmacy*. Please take a moment to enroll today if you have not already done so. The enrollment process is free and takes just a few minutes. To enroll, please download the [employer] edith to your tablet or phone, or visit [email or web address] to enroll on your computer. And, as an [address] [place] [address] patient with a [employer] account, the results of your visits will be scanned into your electronic medical record and your primary care provider will be able to view the scanned results. We urge you to continue to see your regular [employer] provider for your ongoing medical care. And while your primary care provider may not be the one available when you seek a [employer] virtual visit, the peace of mind you get from getting a real diagnosis real time can be priceless. For more information on [employer], view our Frequently Asked Questions (FAQs) at [email or web address]. Sincerely, 
 
Shant Farrell MD 
Chief Medical Officer [employer] *:  certain medications cannot be prescribed via [employer] Providers Seen During Your Hospitalization Provider Specialty Primary office phone Deidra Bustamante MD Vascular Surgery [phone] Your Primary Care Physician (PCP) Primary Care Physician Office Phone Office Fax Marian Auguste [phone] You are allergic to the following No active allergies Recent Documentation Weight BMI Smoking Status 86.2 kg 25.77 kg/m2 Former Smoker Emergency Contacts Name Discharge Info Relation Home Work Mobile Servando,Dave III DISCHARGE CAREGIVER [3] Son [22] [phone] [facility] DISCHARGE CAREGIVER [3] Brother [24]   [phone] Ana Deutscher  Sister [23] [phone] Dave Al  Child [2] [phone] Patient Belongings The following personal items are in your possession at time of discharge: 
  Dental Appliances: None  Visual Aid: None Please provide this summary of care documentation to your next provider. Signatures-by signing, you are acknowledging that this After Visit Summary has been reviewed with you and you have received a copy. Patient Signature:  ____________________________________________________________ Date:  ____________________________________________________________  
  
Marga Huntm Provider Signature:  ____________________________________________________________ Date:  ____________________________________________________________

## 2018-08-09 NOTE — Clinical Note
Peripheral Lesion 1, atherectomy performed: rotational. Pass RPM = 120. Duration = 30 sec. Pass # = 2.

## 2018-08-09 NOTE — Clinical Note
Peripheral Lesion 1, atherectomy performed: rotational. Pass RPM = 90. Duration = 30 sec. Pass # = 2.

## 2018-08-10 NOTE — OP NOTES
[address] [place] [address]   OPERATIVE REPORT    Mery HaRuddy Sylvester.  MR#: [id number]  : 1955  ACCOUNT #: [de-identified]   DATE OF SERVICE: 2018    SURGEON:  Braden Be MD    PREOPERATIVE DIAGNOSIS:  Claudication of the left lower extremity. POSTOPERATIVE DIAGNOSIS:  Claudication of the left lower extremity. PROCEDURES PERFORMED:  1. Ultrasound-guided access of right common femoral artery. 2.  Left lower extremity selective second order catheterization, selective left lower extremity angiogram.  3.  Third order catheterization and beyond. 4.  Atherectomy and balloon angioplasty of the superficial femoral artery. 5.  Closure of common femoral artery using Perclose device. 6.  Moderate conscious sedation of 44 minutes and 52 seconds. CULTURES:  None. SPECIMENS REMOVED:  None. DRAINS:  None. ESTIMATED BLOOD LOSS:  Less than 50 mL. ANESTHESIA:  Moderate conscious sedation 44 minutes and 52 seconds. This was provided by an independent provider, given medications per my discretion and monitoring of vital signs throughout the case. COMPLICATIONS:  None. ASSISTANTS:  None. IMPLANTS:  None. INDICATION FOR THE PROCEDURE:  The patient is a 79-year-old gentleman with claudication of the left lower extremity. The patient was given risks and benefits of the procedure including but not limited to bleeding, infection, damage to adjacent structures, MI, stroke, and death as well as loss of lower extremity and need for further surgery. Patient was understanding of all the risks and underwent the procedure. OPERATIVE FINDINGS:    LEFT LOWER EXTREMITY:  1. Common femoral artery was patent without stenosis. 2.  Profunda femoris artery is patent without stenosis. 3.  Superficial femoral artery is patent proximally, but then broadly occluded. 4.  The above knee popliteal artery is patent without stenosis and there is reconstituted through profunda collaterals.   5.  The below knee popliteal artery is patent without stenosis. 6.  The anterior tibial artery is patent without stenosis. 7.  The tibioperoneal trunk artery is patent without stenosis. 8.  The peroneal artery is patent without stenosis. 9.  The posterior tibial artery is patent without stenosis. PROCEDURE:  The patient was correctly identified in the precath area, taken to the cath lab in stable condition. The patient had preprocedure timeout prior to incision. The patient was prepped and draped in normal sterile fashion following [facility] compliant guidelines for aseptic technique. We then were able to use an ultrasound to visualize the right common femoral artery. A picture was taken and copied to the patient's chart. We numbed her up appropriately using 1% lidocaine, took a single wall entry needle, micropuncture needle, gained access into the artery. Once the needle tip was identified within the artery and there was positive blood return,  a mandril wire was then placed. We then were able to make a small skin incision using an 11 blade, upsized to a 4-Algerian micropuncture sheath. Inner dilator and mandril wire were removed. A Bentson wire was placed and a 4-Algerian sheath was placed. Contraflush catheter was then placed and we crossed over the aortic bifurcation with the ContraFlush catheter and Glidewire Advantage. With the ContraFlush catheter within the left external iliac artery, left lower extremity angiogram was performed showing the above findings. We then decided to move forward with the procedure. The patient was appropriately heparinized. We then were able to get into the superficial femoral artery and placed our 6-Algerian Nelson sheath in here. We then crossed the lesion with a Glidewire Advantage and quick-cross confirmed with angiography, placed a Viper wire in and then performed atherectomy and balloon angioplasty.   Atherectomy was done with a 1.5 mm Stealth crown device and balloon angioplasty was done with a 6 mm balloon. Repeat angiogram shows now rapid blood flow without any distal embolization. There is a dissection that is within the superficial femoral artery, but it is not flow limiting and does not appear to be performing any visible narrowing anywhere but is definitely present. Decision made not to perform any stenting at this current time. We did perform multiple obliques and views. We then shot a dedicated run showing a good stick for closure device. A 6-Slovenian ProGlide was then deployed in a standard fashion with 2 minutes of manual compression and good hemostasis. Patient tolerated the procedure well without any issues.       Willis Yip MD       MAC / MN  D: 08/09/2018 14:13     T: 08/09/2018 21:52  JOB #: [id number]

## 2018-08-14 ENCOUNTER — TELEPHONE (OUTPATIENT)
Dept: VASCULAR SURGERY | Age: 63
End: 2018-08-14

## 2018-08-14 RX ORDER — KETOROLAC TROMETHAMINE 10 MG/1
10 TABLET, FILM COATED ORAL
Qty: 12 TAB | Refills: 0 | Status: SHIPPED | OUTPATIENT
Start: 2018-08-14 | End: 2018-10-24

## 2018-08-14 NOTE — TELEPHONE ENCOUNTER
Patient called complaining of left leg pain and wanted to know if could have something stronger than Tylenol or Motrin. Reviewed with Dr. Kevin Null and verbal order given for Toradol 10 mg tablet, take one tablet by mouth every 6 hours as needed for pain for 3 days. Quantity: 12 tablets with no refills.

## 2018-09-05 ENCOUNTER — OFFICE VISIT (OUTPATIENT)
Dept: VASCULAR SURGERY | Age: 63
End: 2018-09-05

## 2018-09-05 VITALS
HEART RATE: 80 BPM | RESPIRATION RATE: 16 BRPM | BODY MASS INDEX: 25.73 KG/M2 | SYSTOLIC BLOOD PRESSURE: 120 MMHG | HEIGHT: 72 IN | DIASTOLIC BLOOD PRESSURE: 70 MMHG | WEIGHT: 190 LBS

## 2018-09-05 DIAGNOSIS — I73.9 PAD (PERIPHERAL ARTERY DISEASE) (HCC): ICD-10-CM

## 2018-09-05 DIAGNOSIS — I70.213 ATHEROSCLEROSIS OF NATIVE ARTERY OF BOTH LOWER EXTREMITIES WITH INTERMITTENT CLAUDICATION (HCC): Primary | ICD-10-CM

## 2018-09-05 DIAGNOSIS — M06.9 RHEUMATOID ARTHRITIS INVOLVING MULTIPLE SITES, UNSPECIFIED RHEUMATOID FACTOR PRESENCE: ICD-10-CM

## 2018-09-05 NOTE — PROGRESS NOTES
1. Have you been to an emergency room or urgent care clinic since your last visit? no    Hospitalized since your last visit? If yes, where, when, and reason for visit? no  2. Have you seen or consulted any other health care providers outside of the [facility] since your last visit including any procedures, health maintenance items.  If yes, where, when and reason for visit? no

## 2018-09-05 NOTE — MR AVS SNAPSHOT
303 [facility] [address] 
 
 
 27 [address] 25 910 200 [facility] 
[phone] Patient: Dave Al Jr. MRN: [id number] SZY:[birth year] Visit Information Date & Time Provider Department Dept. Phone Encounter #  
 9/5/2018 11:30 AM [facility] [phone] Your Appointments 9/17/2018  9:00 AM  
PROCEDURE with [facility] IMAGING 2 [facility] ([facility]) Appt Note: vein mapping lower arnaud wild 2300 [facility] 363 200 [facility]  
[phone] 2300 [facility] 200 [facility] 9/17/2018 10:00 AM  
PROCEDURE with [facility] IMAGING 2 [facility] ([facility]) Appt Note: lowr left wild 2300 [facility] 034 200 [facility]  
[phone] 9/17/2018 11:00 AM  
PROCEDURE with [facility] NONIMAGING [facility] ([facility]) Appt Note: alla wild to see sonia for results for surgery discussion [facility] 200 [facility]  
[phone] [address] [place] [address],[facility]  
  
    
 9/26/2018 10:45 AM  
Follow Up with Yanely Morrell MD  
600 [facility]) Appt Note: follow up after studies per [place] to see dr Asim Choudhary for surgery discussion 2300 [facility]96 200 [facility]  
[phone] 2300 [facility] 200 [facility] Upcoming Health Maintenance Date Due Hepatitis C Screening 1955 Pneumococcal 19-64 Highest Risk (1 of 3 - PCV13) 1/25/1973 DTaP/Tdap/Td series (1 - Tdap) 1/25/1975 FOBT Q 1 YEAR AGE 50-75 1/25/2004 ZOSTER VACCINE AGE 60> 11/25/2013 MEDICARE YEARLY EXAM 3/14/2018 Influenza Age 5 to Adult 8/1/2018 Allergies as of 9/5/2018  Review Complete On: 9/5/2018 By: Iker Cortes No Known Allergies Current Immunizations  Reviewed on 2/5/2014 No immunizations on file. Not reviewed this visit You Were Diagnosed With   
  
 Codes Comments PAD (peripheral artery disease) (HCC)    -  Primary ICD-10-CM: I73.9 ICD-9-CM: 443. 9 Vitals BP Pulse Resp Height(growth percentile) Weight(growth percentile) BMI  
 120/70 (BP 1 Location: Left arm, BP Patient Position: Sitting) 80 16 6' (1.829 m) 190 lb (86.2 kg) 25.77 kg/m2 Smoking Status Former Smoker Vitals History BMI and BSA Data Body Mass Index Body Surface Area 25.77 kg/m 2 2.09 m 2 Preferred Pharmacy Pharmacy Name Phone [facility] [address], [address] [facility] [address] [facility] [phone] Your Updated Medication List  
  
   
This list is accurate as of 9/5/18 12:01 PM.  Always use your most recent med list.  
  
  
  
  
 albuterol 90 mcg/actuation inhaler Commonly known as:  PROVENTIL HFA, VENTOLIN HFA, PROAIR HFA Take 2 Puffs by inhalation every four (4) hours as needed for Wheezing or Shortness of Breath. Indications: BRONCHOSPASM PREVENTION, Chronic Obstructive Pulmonary Disease  
  
 aspirin 325 mg tablet Commonly known as:  ASPIRIN Take 325 mg by mouth daily. cholecalciferol (VITAMIN D3) 5,000 unit Tab tablet Commonly known as:  VITAMIN D3 Take 5,000 Units by mouth daily. ketorolac 10 mg tablet Commonly known as:  TORADOL Take 1 Tab by mouth every six (6) hours as needed for Pain.  
  
 leflunomide 20 mg tablet Commonly known as:  Becerra Austin Take 20 mg by mouth daily. LIPITOR 20 mg tablet Generic drug:  atorvastatin Take  by mouth daily. NexIUM 20 mg capsule Generic drug:  esomeprazole Take 20 mg by mouth daily. predniSONE 5 mg tablet Commonly known as:  Alicia Castrejon  
 Take  by mouth daily. Take 1 to 3 tabs daily To-Do List   
 09/21/2018 Vascular/US:  DUPLEX LOWER EXT ART LEFT W MARIELLA Please provide this summary of care documentation to your next provider. Your primary care clinician is listed as [address]. If you have any questions after today's visit, please call [phone].

## 2018-09-05 NOTE — PROGRESS NOTES
Dave PURCELL Servando Tolbert. Chief Complaint   Patient presents with    Surgical Follow-up       History and Physical    Dave Erazo is a 58 y.o. male with bilateral lower extremity claudication who presents today after left leg angiogram with atherectomy and balloon angioplasty of the SFA. He also had recent right leg angiogram and has been recommended for a right femoral to above-knee popliteal artery bypass. Today he seems to be doing fairly well. He does feel that his left leg claudication may be slightly improved but he also has severe rheumatoid arthritis and lives with constant pain. He has been recommended to start therapy with an immunosuppressant drug but was told he would not start this until after his bypass surgery as this may be interfere with wound healing and put him at greater risk for infection. He has not yet had his vein mapping to assess for any adequate vein conduit for bypass surgery. He denies any fevers or chills. He does continue to have right leg claudication which is certainly lifestyle limiting but does state that he is able to push through and continue walking if he slows his pace down dramatically. He does not complain of any rest pain in the office today. He has no fevers or chills.     Past Medical History:   Diagnosis Date    Arthritis     [facility] [address]     COPD     Heart failure (HCC)     stents x2 in 2008    Hypertension      Past Surgical History:   Procedure Laterality Date    CARDIAC SURG PROCEDURE UNLIST      COLONOSCOPY N/A 8/15/2016    COLONOSCOPY with polypectomy, biopsy and clip performed by Mercy Hernandez MD at [facility]    HX CORONARY STENT PLACEMENT  6-5-13    HX HEART CATHETERIZATION  6-5-13    REPAIR ING HERNIA,5+Y/O,REDUCIBL Right 12/05/2017    Dr. Ana Sargent     Patient Active Problem List   Diagnosis Code    Sepsis(995.91) A41.9    Immunocompromised state ([address] D84.9    Anemia, unspecified D64.9    Bone cancer ([facility] [address] C41.9    PAD (peripheral artery disease) ([facility]) I73.9    Aortic ectasia, abdominal ([facility]) I77.811    Iliac artery aneurysm, left ([facility]) I72.3    Right inguinal hernia K40.90    Atherosclerosis of native artery of both lower extremities with intermittent claudication ([facility]) I70.213     Current Outpatient Prescriptions   Medication Sig Dispense Refill    ketorolac (TORADOL) 10 mg tablet Take 1 Tab by mouth every six (6) hours as needed for Pain. 12 Tab 0    leflunomide (ARAVA) 20 mg tablet Take 20 mg by mouth daily.  predniSONE (DELTASONE) 5 mg tablet Take  by mouth daily. Take 1 to 3 tabs daily      albuterol (PROVENTIL HFA, VENTOLIN HFA, PROAIR HFA) 90 mcg/actuation inhaler Take 2 Puffs by inhalation every four (4) hours as needed for Wheezing or Shortness of Breath. Indications: BRONCHOSPASM PREVENTION, Chronic Obstructive Pulmonary Disease 1 Inhaler 0    atorvastatin (LIPITOR) 20 mg tablet Take  by mouth daily.  cholecalciferol, VITAMIN D3, (VITAMIN D3) 5,000 unit tab tablet Take 5,000 Units by mouth daily.  aspirin (ASPIRIN) 325 mg tablet Take 325 mg by mouth daily.  esomeprazole (NEXIUM) 20 mg capsule Take 20 mg by mouth daily. No Known Allergies    Physical Exam:    Visit Vitals    /70 (BP 1 Location: Left arm, BP Patient Position: Sitting)    Pulse 80    Resp 16    Ht 6' (1.829 m)    Wt 190 lb (86.2 kg)    BMI 25.77 kg/m2      General: Well-appearing male in no acute distress   HEENT: EOMI, no scleral icterus is noted. Pulmonary: No increased work or breathing is noted. Abdomen: nondistended. Extremities: Warm and well perfused bilaterally. Pt has no significant bilateral lower extremity edema. He does have a palpable DP pulse on the left.   Neuro: Cranial nerves II through XII are grossly intact   Integument: No ulcerations are identified visibly      Impression and Plan:  Natacha Love is a 58 y.o. male with bilateral lower extremity claudication now status post bilateral angiograms. He has been recommended for right femoral to above-knee popliteal artery bypass on the right. He did have atherectomy and balloon angioplasty of the left SFA most recently. His left leg claudication does seem to be somewhat improved. His symptoms are somewhat difficult to assess due to severe rheumatoid arthritis. Patient states that he has a lot on his plate currently with multiple family members having surgeries in the next couple of weeks. He does not wish to schedule any further surgery at this current time. Discussed that we will obtain vein mapping of his bilateral lower extremities as well as obtain follow-up arterial studies to assess for his new baseline after vascular intervention. We will have him follow-up in about a month's time to review his imaging as well as for further surgical discussion. Plan was discussed. Patient expresses understanding and agrees. Patricia Hernandez  [phone]        PLEASE NOTE:  This document has been produced using voice recognition software. Unrecognized errors in transcription may be present.

## 2018-09-26 ENCOUNTER — OFFICE VISIT (OUTPATIENT)
Dept: VASCULAR SURGERY | Age: 63
End: 2018-09-26

## 2018-09-26 VITALS
SYSTOLIC BLOOD PRESSURE: 122 MMHG | DIASTOLIC BLOOD PRESSURE: 64 MMHG | HEIGHT: 72 IN | BODY MASS INDEX: 25.73 KG/M2 | WEIGHT: 190 LBS

## 2018-09-26 DIAGNOSIS — I73.9 PAD (PERIPHERAL ARTERY DISEASE) (HCC): ICD-10-CM

## 2018-09-26 DIAGNOSIS — I70.213 ATHEROSCLEROSIS OF NATIVE ARTERY OF BOTH LOWER EXTREMITIES WITH INTERMITTENT CLAUDICATION (HCC): Primary | ICD-10-CM

## 2018-09-26 RX ORDER — HYDROXYCHLOROQUINE SULFATE 200 MG/1
200 TABLET, FILM COATED ORAL DAILY
COMMUNITY

## 2018-09-26 NOTE — PROGRESS NOTES
1. Have you been to an emergency room or urgent care clinic since your last visit? No  Hospitalized since your last visit? If yes, where, when, and reason for visit? No  2. Have you seen or consulted any other health care providers outside of the [facility] since your last visit including any procedures, health maintenance items. If yes, where, when and reason for visit?

## 2018-09-26 NOTE — H&P (VIEW-ONLY)
Dave PURCELL Servando Tolbert. Chief Complaint Patient presents with  Leg Pain History and Physical   
Dave Polanco. is a 58 y.o. male with bilateral lower extremity claudication. Patient recently had left lower extremity atherectomy and balloon angioplasty of the superficial femoral artery and above-knee popliteal artery. Patient has done extraordinarily well from that procedure. He states that all of his claudication has fully resolved and he has no residual discomfort within his left lower extremity. He does continue to have his claudication is lifestyle limiting on the right lower extremity. No rest pain or ulcerations no fevers or chills. Past Medical History:  
Diagnosis Date  Arthritis  Cancer of bone ([facility] [address]  COPD  Heart failure ([facility] [address] stents x2 in 2008  Hypertension Past Surgical History:  
Procedure Laterality Date  CARDIAC SURG PROCEDURE UNLIST  COLONOSCOPY N/A 8/15/2016 COLONOSCOPY with polypectomy, biopsy and clip performed by Rayne Bennett MD at [facility]  6-5-13  
[address]  6-5-13  REPAIR ING HERNIA,5+Y/O,REDUCIBL Right 12/05/2017 Dr. Jose Morales Patient Active Problem List  
Diagnosis Code  Sepsis(995.91) A41.9  Immunocompromised state ([facility] [address] D84.9  Anemia, unspecified D64.9  Bone cancer (HCC) C41.9  
 PAD (peripheral artery disease) ([facility]) I73.9  Aortic ectasia, abdominal ([facility] [address] U84.208  Iliac artery aneurysm, left ([facility]) I72.3  Right inguinal hernia K40.90  Atherosclerosis of native artery of both lower extremities with intermittent claudication ([facility] [address] P00.829 Current Outpatient Prescriptions Medication Sig Dispense Refill  hydroxychloroquine (PLAQUENIL) 200 mg tablet Take 200 mg by mouth daily.  ketorolac (TORADOL) 10 mg tablet Take 1 Tab by mouth every six (6) hours as needed for Pain.  12 Tab 0  
  leflunomide (ARAVA) 20 mg tablet Take 20 mg by mouth daily.  predniSONE (DELTASONE) 5 mg tablet Take  by mouth daily. Take 1 to 3 tabs daily  albuterol (PROVENTIL HFA, VENTOLIN HFA, PROAIR HFA) 90 mcg/actuation inhaler Take 2 Puffs by inhalation every four (4) hours as needed for Wheezing or Shortness of Breath. Indications: BRONCHOSPASM PREVENTION, Chronic Obstructive Pulmonary Disease 1 Inhaler 0  
 atorvastatin (LIPITOR) 20 mg tablet Take  by mouth daily.  cholecalciferol, VITAMIN D3, (VITAMIN D3) 5,000 unit tab tablet Take 5,000 Units by mouth daily.  aspirin (ASPIRIN) 325 mg tablet Take 325 mg by mouth daily.  esomeprazole (NEXIUM) 20 mg capsule Take 20 mg by mouth daily. No Known Allergies Social History Social History  Marital status:  Spouse name: N/A  
 Number of children: N/A  
 Years of education: N/A Occupational History  Not on file. Social History Main Topics  Smoking status: Former Smoker Packs/day: 2.00 Years: 39.00 Quit date: 8/10/2012  Smokeless tobacco: Never Used  Alcohol use No  
 Drug use: No  
 Sexual activity: No  
 
Other Topics Concern  Not on file Social History Narrative Family History Problem Relation Age of Onset  Stroke Mother  Heart Disease Father Physical Exam:   
Visit Vitals  /64 (BP 1 Location: Left arm, BP Patient Position: Sitting)  Ht 6' (1.829 m)  Wt 190 lb (86.2 kg)  BMI 25.77 kg/m2 General: Well-appearing male in no acute distress HEENT: EOMI no scleral icterus is noted moist movement mucous membranes are noted Pulmonary: No increased work of breathing is noted clear to auscultation bilaterally no wheezes rales rhonchi 
Cardiovascular: Regular rate rhythm normal S1-S2 rubs murmurs or gallops Extremities: Warm and well-perfused bilaterally Neuro: Cranial nerves II through XII grossly intact Impression and Plan: Magnus Shipley. is a 58 y.o. male with claudication of the right lower extremity now. I reviewed his ultrasound clinic today this shows that he has normal blood flow in his left lower extremity now but he still does have moderate arterial disease of the right lower extremity with known occlusion of the SFA. We had an extensive discussion as to the different options that he can undergo. He has decided on attempting a right remote endarterectomy of the superficial femoral artery with right lower extremity angiogram possible need for popliteal artery stent with possible need for right common femoral artery to right above-knee popliteal artery greater saphenous vein bypass. We did talk about performing bypass out right but he wanted to give another hybrid procedure a chance prior to moving forward with chest bypass. I did let him know that if his hyper procedure does fail then we would be moving forward with that bypass. I reviewed his vein mapping showing that his right greater saphenous vein is appropriate for use. He was given risks and benefits of the procedure including but not limited to bleeding, infection, damage to adjacent structures, MI, stroke, death, need for further surgery, loss of lower extremity. Patient is understanding all the risks and is willing to move forward with the surgery. We reviewed the plan with the patient and the patient understands. We also gave the patient appropriate instructions on their disease process and when to call back. Greater than 50% of this visit was spent with face to face discussion. Follow-up Disposition: Not on File Dara Chung MD 
 
PLEASE NOTE: 
This document has been produced using voice recognition software. Unrecognized errors in transcription may be present.

## 2018-10-18 ENCOUNTER — HOSPITAL ENCOUNTER (OUTPATIENT)
Dept: GENERAL RADIOLOGY | Age: 63
Discharge: HOME | End: 2018-10-18
Payer: MEDICARE

## 2018-10-18 ENCOUNTER — HOSPITAL ENCOUNTER (OUTPATIENT)
Dept: PREADMISSION TESTING | Age: 63
Discharge: HOME | End: 2018-10-18
Payer: MEDICARE

## 2018-10-18 DIAGNOSIS — I70.213 ATHEROSCLEROSIS OF NATIVE ARTERY OF BOTH LOWER EXTREMITIES WITH INTERMITTENT CLAUDICATION (HCC): ICD-10-CM

## 2018-10-18 DIAGNOSIS — I73.9 PAD (PERIPHERAL ARTERY DISEASE) (HCC): ICD-10-CM

## 2018-10-18 LAB
ANION GAP SERPL CALC-SCNC: 5 MMOL/L (ref 3–18)
BUN SERPL-MCNC: 9 MG/DL (ref 7–18)
BUN/CREAT SERPL: 9 (ref 12–20)
CALCIUM SERPL-MCNC: 8.4 MG/DL (ref 8.5–10.1)
CHLORIDE SERPL-SCNC: 107 MMOL/L (ref 100–108)
CO2 SERPL-SCNC: 29 MMOL/L (ref 21–32)
CREAT SERPL-MCNC: 0.96 MG/DL (ref 0.6–1.3)
ERYTHROCYTE [DISTWIDTH] IN BLOOD BY AUTOMATED COUNT: 15 % (ref 11.6–14.5)
GLUCOSE SERPL-MCNC: 67 MG/DL (ref 74–99)
HCT VFR BLD AUTO: 42.6 % (ref 36–48)
HGB BLD-MCNC: 13.4 G/DL (ref 13–16)
MCH RBC QN AUTO: 25.9 PG (ref 24–34)
MCHC RBC AUTO-ENTMCNC: 31.5 G/DL (ref 31–37)
MCV RBC AUTO: 82.4 FL (ref 74–97)
PLATELET # BLD AUTO: 186 K/UL (ref 135–420)
PMV BLD AUTO: 9.7 FL (ref 9.2–11.8)
POTASSIUM SERPL-SCNC: 4.1 MMOL/L (ref 3.5–5.5)
RBC # BLD AUTO: 5.17 M/UL (ref 4.7–5.5)
SODIUM SERPL-SCNC: 141 MMOL/L (ref 136–145)
WBC # BLD AUTO: 8.4 K/UL (ref 4.6–13.2)

## 2018-10-18 PROCEDURE — 80048 BASIC METABOLIC PNL TOTAL CA: CPT | Performed by: SURGERY

## 2018-10-18 PROCEDURE — 85027 COMPLETE CBC AUTOMATED: CPT | Performed by: SURGERY

## 2018-10-18 PROCEDURE — 71046 X-RAY EXAM CHEST 2 VIEWS: CPT

## 2018-10-18 PROCEDURE — 36415 COLL VENOUS BLD VENIPUNCTURE: CPT | Performed by: SURGERY

## 2018-10-22 ENCOUNTER — ANESTHESIA EVENT (OUTPATIENT)
Dept: CARDIOTHORACIC SURGERY | Age: 63
DRG: 254 | End: 2018-10-22
Payer: MEDICARE

## 2018-10-23 ENCOUNTER — HOSPITAL ENCOUNTER (INPATIENT)
Age: 63
LOS: 1 days | Discharge: HOME | DRG: 254 | End: 2018-10-24
Attending: SURGERY | Admitting: SURGERY
Payer: MEDICARE

## 2018-10-23 ENCOUNTER — APPOINTMENT (OUTPATIENT)
Dept: GENERAL RADIOLOGY | Age: 63
DRG: 254 | End: 2018-10-23
Attending: SURGERY
Payer: MEDICARE

## 2018-10-23 ENCOUNTER — ANESTHESIA (OUTPATIENT)
Dept: CARDIOTHORACIC SURGERY | Age: 63
DRG: 254 | End: 2018-10-23
Payer: MEDICARE

## 2018-10-23 DIAGNOSIS — G89.18 ACUTE POST-OPERATIVE PAIN: ICD-10-CM

## 2018-10-23 DIAGNOSIS — I73.9 PAD (PERIPHERAL ARTERY DISEASE) (HCC): Primary | ICD-10-CM

## 2018-10-23 LAB
ABO + RH BLD: NORMAL
BLOOD GROUP ANTIBODIES SERPL: NORMAL
SPECIMEN EXP DATE BLD: NORMAL

## 2018-10-23 PROCEDURE — 77030002996 HC SUT SLK J&J -A: Performed by: SURGERY

## 2018-10-23 PROCEDURE — 37248 TRLUML BALO ANGIOP 1ST VEIN: CPT

## 2018-10-23 PROCEDURE — C1874 STENT, COATED/COV W/DEL SYS: HCPCS | Performed by: SURGERY

## 2018-10-23 PROCEDURE — 77030031139 HC SUT VCRL2 J&J -A: Performed by: SURGERY

## 2018-10-23 PROCEDURE — C1894 INTRO/SHEATH, NON-LASER: HCPCS | Performed by: SURGERY

## 2018-10-23 PROCEDURE — 04CM0ZZ EXTIRPATION OF MATTER FROM RIGHT POPLITEAL ARTERY, OPEN APPROACH: ICD-10-PCS | Performed by: SURGERY

## 2018-10-23 PROCEDURE — 77030004565 HC CATH ANGI DX TMPO CARD -B: Performed by: SURGERY

## 2018-10-23 PROCEDURE — 77030013797 HC KT TRNSDUC PRSSR EDWD -A: Performed by: SURGERY

## 2018-10-23 PROCEDURE — 74011250636 HC RX REV CODE- 250/636

## 2018-10-23 PROCEDURE — 77030002986 HC SUT PROL J&J -A: Performed by: SURGERY

## 2018-10-23 PROCEDURE — 86900 BLOOD TYPING SEROLOGIC ABO: CPT | Performed by: SURGERY

## 2018-10-23 PROCEDURE — C1769 GUIDE WIRE: HCPCS | Performed by: SURGERY

## 2018-10-23 PROCEDURE — 047K0DZ DILATION OF RIGHT FEMORAL ARTERY WITH INTRALUMINAL DEVICE, OPEN APPROACH: ICD-10-PCS | Performed by: SURGERY

## 2018-10-23 PROCEDURE — 77030002524 HC INSTR CLMP FGRTY EDWD -B: Performed by: SURGERY

## 2018-10-23 PROCEDURE — 74011250636 HC RX REV CODE- 250/636: Performed by: SURGERY

## 2018-10-23 PROCEDURE — 76010000109 HC CV SURG 2.5 TO 3 HR: Performed by: SURGERY

## 2018-10-23 PROCEDURE — 74011636320 HC RX REV CODE- 636/320: Performed by: SURGERY

## 2018-10-23 PROCEDURE — 77030032490 HC SLV COMPR SCD KNE COVD -B: Performed by: SURGERY

## 2018-10-23 PROCEDURE — 77030010603 HC BLN DEV INFL BSC -B: Performed by: SURGERY

## 2018-10-23 PROCEDURE — 74011250637 HC RX REV CODE- 250/637: Performed by: SURGERY

## 2018-10-23 PROCEDURE — 047M0DZ DILATION OF RIGHT POPLITEAL ARTERY WITH INTRALUMINAL DEVICE, OPEN APPROACH: ICD-10-PCS | Performed by: SURGERY

## 2018-10-23 PROCEDURE — 74011250636 HC RX REV CODE- 250/636: Performed by: NURSE ANESTHETIST, CERTIFIED REGISTERED

## 2018-10-23 PROCEDURE — C1725 CATH, TRANSLUMIN NON-LASER: HCPCS | Performed by: SURGERY

## 2018-10-23 PROCEDURE — 77030010509 HC AIRWY LMA MSK TELE -A: Performed by: ANESTHESIOLOGY

## 2018-10-23 PROCEDURE — 04UL0JZ SUPPLEMENT LEFT FEMORAL ARTERY WITH SYNTHETIC SUBSTITUTE, OPEN APPROACH: ICD-10-PCS | Performed by: SURGERY

## 2018-10-23 PROCEDURE — 77030034850: Performed by: SURGERY

## 2018-10-23 PROCEDURE — 77030013079 HC BLNKT BAIR HGGR 3M -A: Performed by: ANESTHESIOLOGY

## 2018-10-23 PROCEDURE — 77030008584 HC TOOL GDWRE DEV TERU -A: Performed by: SURGERY

## 2018-10-23 PROCEDURE — 65620000000 HC RM CCU GENERAL

## 2018-10-23 PROCEDURE — 04CK0ZZ EXTIRPATION OF MATTER FROM RIGHT FEMORAL ARTERY, OPEN APPROACH: ICD-10-PCS | Performed by: SURGERY

## 2018-10-23 PROCEDURE — 74011250637 HC RX REV CODE- 250/637

## 2018-10-23 PROCEDURE — 74011000250 HC RX REV CODE- 250: Performed by: NURSE ANESTHETIST, CERTIFIED REGISTERED

## 2018-10-23 PROCEDURE — B41F1ZZ FLUOROSCOPY OF RIGHT LOWER EXTREMITY ARTERIES USING LOW OSMOLAR CONTRAST: ICD-10-PCS | Performed by: SURGERY

## 2018-10-23 PROCEDURE — 77030010512 HC APPL CLP LIG J&J -C: Performed by: SURGERY

## 2018-10-23 PROCEDURE — C1763 CONN TISS, NON-HUMAN: HCPCS | Performed by: SURGERY

## 2018-10-23 PROCEDURE — 76060000036 HC ANESTHESIA 2.5 TO 3 HR: Performed by: SURGERY

## 2018-10-23 PROCEDURE — 77030018836 HC SOL IRR NACL ICUM -A: Performed by: SURGERY

## 2018-10-23 PROCEDURE — 77030039266 HC ADH SKN EXOFIN S2SG -A: Performed by: SURGERY

## 2018-10-23 DEVICE — IMPLANTABLE DEVICE: Type: IMPLANTABLE DEVICE | Site: LEG | Status: FUNCTIONAL

## 2018-10-23 RX ORDER — LIDOCAINE HYDROCHLORIDE 10 MG/ML
INJECTION, SOLUTION EPIDURAL; INFILTRATION; INTRACAUDAL; PERINEURAL
Status: DISCONTINUED
Start: 2018-10-23 | End: 2018-10-23

## 2018-10-23 RX ORDER — LIDOCAINE HYDROCHLORIDE 20 MG/ML
INJECTION, SOLUTION EPIDURAL; INFILTRATION; INTRACAUDAL; PERINEURAL AS NEEDED
Status: DISCONTINUED | OUTPATIENT
Start: 2018-10-23 | End: 2018-10-23 | Stop reason: HOSPADM

## 2018-10-23 RX ORDER — HYDROXYCHLOROQUINE SULFATE 200 MG/1
200 TABLET, FILM COATED ORAL DAILY
Status: DISCONTINUED | OUTPATIENT
Start: 2018-10-24 | End: 2018-10-24 | Stop reason: HOSPADM

## 2018-10-23 RX ORDER — SODIUM CHLORIDE 0.9 % (FLUSH) 0.9 %
5-10 SYRINGE (ML) INJECTION AS NEEDED
Status: DISCONTINUED | OUTPATIENT
Start: 2018-10-23 | End: 2018-10-23 | Stop reason: HOSPADM

## 2018-10-23 RX ORDER — ACETAMINOPHEN 325 MG/1
650 TABLET ORAL
Status: DISCONTINUED | OUTPATIENT
Start: 2018-10-23 | End: 2018-10-24 | Stop reason: HOSPADM

## 2018-10-23 RX ORDER — ALBUTEROL SULFATE 90 UG/1
AEROSOL, METERED RESPIRATORY (INHALATION) AS NEEDED
Status: DISCONTINUED | OUTPATIENT
Start: 2018-10-23 | End: 2018-10-23 | Stop reason: HOSPADM

## 2018-10-23 RX ORDER — INSULIN LISPRO 100 [IU]/ML
INJECTION, SOLUTION INTRAVENOUS; SUBCUTANEOUS ONCE
Status: DISCONTINUED | OUTPATIENT
Start: 2018-10-23 | End: 2018-10-23 | Stop reason: HOSPADM

## 2018-10-23 RX ORDER — LIDOCAINE HYDROCHLORIDE 10 MG/ML
INJECTION, SOLUTION EPIDURAL; INFILTRATION; INTRACAUDAL; PERINEURAL AS NEEDED
Status: DISCONTINUED | OUTPATIENT
Start: 2018-10-23 | End: 2018-10-23 | Stop reason: HOSPADM

## 2018-10-23 RX ORDER — DEXAMETHASONE SODIUM PHOSPHATE 4 MG/ML
INJECTION, SOLUTION INTRA-ARTICULAR; INTRALESIONAL; INTRAMUSCULAR; INTRAVENOUS; SOFT TISSUE AS NEEDED
Status: DISCONTINUED | OUTPATIENT
Start: 2018-10-23 | End: 2018-10-23 | Stop reason: HOSPADM

## 2018-10-23 RX ORDER — NALOXONE HYDROCHLORIDE 0.4 MG/ML
0.4 INJECTION, SOLUTION INTRAMUSCULAR; INTRAVENOUS; SUBCUTANEOUS AS NEEDED
Status: DISCONTINUED | OUTPATIENT
Start: 2018-10-23 | End: 2018-10-24 | Stop reason: HOSPADM

## 2018-10-23 RX ORDER — DIPHENHYDRAMINE HYDROCHLORIDE 50 MG/ML
12.5 INJECTION, SOLUTION INTRAMUSCULAR; INTRAVENOUS
Status: DISCONTINUED | OUTPATIENT
Start: 2018-10-23 | End: 2018-10-24 | Stop reason: HOSPADM

## 2018-10-23 RX ORDER — PROPOFOL 10 MG/ML
INJECTION, EMULSION INTRAVENOUS AS NEEDED
Status: DISCONTINUED | OUTPATIENT
Start: 2018-10-23 | End: 2018-10-23 | Stop reason: HOSPADM

## 2018-10-23 RX ORDER — IODIXANOL 320 MG/ML
INJECTION, SOLUTION INTRAVASCULAR
Status: DISCONTINUED
Start: 2018-10-23 | End: 2018-10-23

## 2018-10-23 RX ORDER — SODIUM CHLORIDE 0.9 % (FLUSH) 0.9 %
5-10 SYRINGE (ML) INJECTION EVERY 8 HOURS
Status: DISCONTINUED | OUTPATIENT
Start: 2018-10-23 | End: 2018-10-24 | Stop reason: HOSPADM

## 2018-10-23 RX ORDER — MIDAZOLAM HYDROCHLORIDE 1 MG/ML
INJECTION, SOLUTION INTRAMUSCULAR; INTRAVENOUS AS NEEDED
Status: DISCONTINUED | OUTPATIENT
Start: 2018-10-23 | End: 2018-10-23 | Stop reason: HOSPADM

## 2018-10-23 RX ORDER — ALBUTEROL SULFATE 90 UG/1
2 AEROSOL, METERED RESPIRATORY (INHALATION)
Status: DISCONTINUED | OUTPATIENT
Start: 2018-10-23 | End: 2018-10-24 | Stop reason: HOSPADM

## 2018-10-23 RX ORDER — SODIUM CHLORIDE, SODIUM LACTATE, POTASSIUM CHLORIDE, CALCIUM CHLORIDE 600; 310; 30; 20 MG/100ML; MG/100ML; MG/100ML; MG/100ML
100 INJECTION, SOLUTION INTRAVENOUS CONTINUOUS
Status: DISCONTINUED | OUTPATIENT
Start: 2018-10-23 | End: 2018-10-24 | Stop reason: HOSPADM

## 2018-10-23 RX ORDER — HEPARIN SODIUM 1000 [USP'U]/ML
INJECTION, SOLUTION INTRAVENOUS; SUBCUTANEOUS AS NEEDED
Status: DISCONTINUED | OUTPATIENT
Start: 2018-10-23 | End: 2018-10-23 | Stop reason: HOSPADM

## 2018-10-23 RX ORDER — CLOPIDOGREL BISULFATE 75 MG/1
75 TABLET ORAL DAILY
Status: DISCONTINUED | OUTPATIENT
Start: 2018-10-24 | End: 2018-10-24 | Stop reason: HOSPADM

## 2018-10-23 RX ORDER — SODIUM CHLORIDE 0.9 % (FLUSH) 0.9 %
5-10 SYRINGE (ML) INJECTION AS NEEDED
Status: DISCONTINUED | OUTPATIENT
Start: 2018-10-23 | End: 2018-10-24 | Stop reason: HOSPADM

## 2018-10-23 RX ORDER — SODIUM CHLORIDE 0.9 % (FLUSH) 0.9 %
5-10 SYRINGE (ML) INJECTION EVERY 8 HOURS
Status: DISCONTINUED | OUTPATIENT
Start: 2018-10-23 | End: 2018-10-23 | Stop reason: HOSPADM

## 2018-10-23 RX ORDER — IODIXANOL 320 MG/ML
INJECTION, SOLUTION INTRAVASCULAR AS NEEDED
Status: DISCONTINUED | OUTPATIENT
Start: 2018-10-23 | End: 2018-10-23 | Stop reason: HOSPADM

## 2018-10-23 RX ORDER — SODIUM CHLORIDE, SODIUM LACTATE, POTASSIUM CHLORIDE, CALCIUM CHLORIDE 600; 310; 30; 20 MG/100ML; MG/100ML; MG/100ML; MG/100ML
75 INJECTION, SOLUTION INTRAVENOUS CONTINUOUS
Status: DISCONTINUED | OUTPATIENT
Start: 2018-10-23 | End: 2018-10-23 | Stop reason: HOSPADM

## 2018-10-23 RX ORDER — LEFLUNOMIDE 20 MG/1
20 TABLET ORAL DAILY
Status: DISCONTINUED | OUTPATIENT
Start: 2018-10-24 | End: 2018-10-24 | Stop reason: HOSPADM

## 2018-10-23 RX ORDER — PANTOPRAZOLE SODIUM 40 MG/1
40 TABLET, DELAYED RELEASE ORAL
Status: DISCONTINUED | OUTPATIENT
Start: 2018-10-24 | End: 2018-10-24 | Stop reason: HOSPADM

## 2018-10-23 RX ORDER — ASPIRIN 325 MG
325 TABLET ORAL DAILY
Status: DISCONTINUED | OUTPATIENT
Start: 2018-10-24 | End: 2018-10-24 | Stop reason: HOSPADM

## 2018-10-23 RX ORDER — ONDANSETRON 2 MG/ML
INJECTION INTRAMUSCULAR; INTRAVENOUS AS NEEDED
Status: DISCONTINUED | OUTPATIENT
Start: 2018-10-23 | End: 2018-10-23 | Stop reason: HOSPADM

## 2018-10-23 RX ORDER — LIDOCAINE HYDROCHLORIDE 10 MG/ML
0.1 INJECTION, SOLUTION EPIDURAL; INFILTRATION; INTRACAUDAL; PERINEURAL AS NEEDED
Status: DISCONTINUED | OUTPATIENT
Start: 2018-10-23 | End: 2018-10-23 | Stop reason: HOSPADM

## 2018-10-23 RX ORDER — CEFAZOLIN SODIUM 2 G/50ML
2 SOLUTION INTRAVENOUS EVERY 8 HOURS
Status: DISCONTINUED | OUTPATIENT
Start: 2018-10-23 | End: 2018-10-23 | Stop reason: HOSPADM

## 2018-10-23 RX ORDER — ONDANSETRON 2 MG/ML
4 INJECTION INTRAMUSCULAR; INTRAVENOUS
Status: DISCONTINUED | OUTPATIENT
Start: 2018-10-23 | End: 2018-10-24 | Stop reason: HOSPADM

## 2018-10-23 RX ORDER — DOCUSATE SODIUM 100 MG/1
100 CAPSULE, LIQUID FILLED ORAL 2 TIMES DAILY
Status: DISCONTINUED | OUTPATIENT
Start: 2018-10-23 | End: 2018-10-24 | Stop reason: HOSPADM

## 2018-10-23 RX ORDER — HEPARIN SODIUM 5000 [USP'U]/ML
5000 INJECTION, SOLUTION INTRAVENOUS; SUBCUTANEOUS EVERY 8 HOURS
Status: DISCONTINUED | OUTPATIENT
Start: 2018-10-23 | End: 2018-10-24 | Stop reason: HOSPADM

## 2018-10-23 RX ORDER — ATORVASTATIN CALCIUM 20 MG/1
20 TABLET, FILM COATED ORAL DAILY
Status: DISCONTINUED | OUTPATIENT
Start: 2018-10-24 | End: 2018-10-24 | Stop reason: HOSPADM

## 2018-10-23 RX ORDER — MORPHINE SULFATE 10 MG/ML
INJECTION, SOLUTION INTRAMUSCULAR; INTRAVENOUS AS NEEDED
Status: DISCONTINUED | OUTPATIENT
Start: 2018-10-23 | End: 2018-10-23 | Stop reason: HOSPADM

## 2018-10-23 RX ORDER — HEPARIN SODIUM 200 [USP'U]/100ML
INJECTION, SOLUTION INTRAVENOUS
Status: DISCONTINUED
Start: 2018-10-23 | End: 2018-10-23

## 2018-10-23 RX ORDER — OXYCODONE AND ACETAMINOPHEN 5; 325 MG/1; MG/1
1 TABLET ORAL
Status: DISCONTINUED | OUTPATIENT
Start: 2018-10-23 | End: 2018-10-24 | Stop reason: HOSPADM

## 2018-10-23 RX ORDER — MORPHINE SULFATE 4 MG/ML
2 INJECTION INTRAVENOUS
Status: DISCONTINUED | OUTPATIENT
Start: 2018-10-23 | End: 2018-10-24 | Stop reason: HOSPADM

## 2018-10-23 RX ADMIN — SODIUM CHLORIDE, SODIUM LACTATE, POTASSIUM CHLORIDE, AND CALCIUM CHLORIDE 75 ML/HR: 600; 310; 30; 20 INJECTION, SOLUTION INTRAVENOUS at 09:15

## 2018-10-23 RX ADMIN — MORPHINE SULFATE 2 MG: 4 INJECTION INTRAVENOUS at 14:11

## 2018-10-23 RX ADMIN — HEPARIN SODIUM 5000 UNITS: 5000 INJECTION, SOLUTION INTRAVENOUS; SUBCUTANEOUS at 21:23

## 2018-10-23 RX ADMIN — SODIUM CHLORIDE, SODIUM LACTATE, POTASSIUM CHLORIDE, AND CALCIUM CHLORIDE 100 ML/HR: 600; 310; 30; 20 INJECTION, SOLUTION INTRAVENOUS at 14:00

## 2018-10-23 RX ADMIN — FAMOTIDINE: 10 INJECTION, SOLUTION INTRAVENOUS at 09:19

## 2018-10-23 RX ADMIN — PROPOFOL 150 MG: 10 INJECTION, EMULSION INTRAVENOUS at 10:19

## 2018-10-23 RX ADMIN — PROPOFOL 50 MG: 10 INJECTION, EMULSION INTRAVENOUS at 10:39

## 2018-10-23 RX ADMIN — SODIUM CHLORIDE, SODIUM LACTATE, POTASSIUM CHLORIDE, AND CALCIUM CHLORIDE: 600; 310; 30; 20 INJECTION, SOLUTION INTRAVENOUS at 11:47

## 2018-10-23 RX ADMIN — Medication 10 ML: at 21:24

## 2018-10-23 RX ADMIN — CEFAZOLIN SODIUM 2 G: 2 SOLUTION INTRAVENOUS at 10:25

## 2018-10-23 RX ADMIN — HEPARIN SODIUM 7000 UNITS: 1000 INJECTION, SOLUTION INTRAVENOUS; SUBCUTANEOUS at 10:54

## 2018-10-23 RX ADMIN — DEXAMETHASONE SODIUM PHOSPHATE 8 MG: 4 INJECTION, SOLUTION INTRA-ARTICULAR; INTRALESIONAL; INTRAMUSCULAR; INTRAVENOUS; SOFT TISSUE at 10:33

## 2018-10-23 RX ADMIN — ONDANSETRON HYDROCHLORIDE 4 MG: 2 INJECTION, SOLUTION INTRAMUSCULAR; INTRAVENOUS at 14:11

## 2018-10-23 RX ADMIN — HEPARIN SODIUM 2000 UNITS: 1000 INJECTION, SOLUTION INTRAVENOUS; SUBCUTANEOUS at 11:45

## 2018-10-23 RX ADMIN — ALBUTEROL SULFATE 2 PUFF: 90 AEROSOL, METERED RESPIRATORY (INHALATION) at 12:54

## 2018-10-23 RX ADMIN — SODIUM CHLORIDE, SODIUM LACTATE, POTASSIUM CHLORIDE, AND CALCIUM CHLORIDE: 600; 310; 30; 20 INJECTION, SOLUTION INTRAVENOUS at 10:15

## 2018-10-23 RX ADMIN — OXYCODONE HYDROCHLORIDE AND ACETAMINOPHEN 1 TABLET: 5; 325 TABLET ORAL at 21:23

## 2018-10-23 RX ADMIN — DOCUSATE SODIUM 100 MG: 100 CAPSULE, LIQUID FILLED ORAL at 17:23

## 2018-10-23 RX ADMIN — ONDANSETRON 4 MG: 2 INJECTION INTRAMUSCULAR; INTRAVENOUS at 12:30

## 2018-10-23 RX ADMIN — Medication 10 ML: at 14:00

## 2018-10-23 RX ADMIN — OXYCODONE HYDROCHLORIDE AND ACETAMINOPHEN 1 TABLET: 5; 325 TABLET ORAL at 15:34

## 2018-10-23 RX ADMIN — LIDOCAINE HYDROCHLORIDE 100 MG: 20 INJECTION, SOLUTION EPIDURAL; INFILTRATION; INTRACAUDAL; PERINEURAL at 10:17

## 2018-10-23 RX ADMIN — MORPHINE SULFATE 10 MG: 10 INJECTION, SOLUTION INTRAMUSCULAR; INTRAVENOUS at 10:15

## 2018-10-23 RX ADMIN — MIDAZOLAM HYDROCHLORIDE 2 MG: 1 INJECTION, SOLUTION INTRAMUSCULAR; INTRAVENOUS at 10:15

## 2018-10-23 NOTE — INTERVAL H&P NOTE
H&P Update:  Dave PURCELL Servando Armenta was seen and examined. History and physical has been reviewed. The patient has been examined.  There have been no significant clinical changes since the completion of the originally dated History and Physical.    Signed By: Jarod Orourke MD     October 23, 2018 9:24 AM

## 2018-10-23 NOTE — PROGRESS NOTES
1541  Hematoma noted R groin, manual pressure applied and resolved. Dr. Tito Calvin at bedside aware.

## 2018-10-23 NOTE — ANESTHESIA POSTPROCEDURE EVALUATION
Procedure(s):  RIGHT FEMORAL ENDARTERECTOMY WITH FEMORAL ARTERY BALLOON ANGIOPLASTY AND STENT PLACEMENT.     Anesthesia Post Evaluation      Multimodal analgesia: multimodal analgesia used between 6 hours prior to anesthesia start to PACU discharge  Patient location during evaluation: bedside  Patient participation: complete - patient participated  Level of consciousness: awake  Pain management: adequate  Airway patency: patent  Anesthetic complications: no  Cardiovascular status: acceptable  Respiratory status: acceptable  Hydration status: acceptable        Visit Vitals  /86   Pulse 87   Temp 36.3 °C (97.4 °F)   Resp 12   Ht 6' (1.829 m)   Wt 87.1 kg (192 lb)   SpO2 99%   BMI 26.04 kg/m²

## 2018-10-23 NOTE — PROGRESS NOTES
@ 2690  Pt with uneventful shift, all needs met, all medication given per [facility]. Pt with no current needs, will continue to monitor. @ 0400 Reassessment, no change. @ 1772 Visual, pt sleeping. 10/24/18 @ 0000 Reassessment, no changes noted. Pt with no reports of pain, will continue to monitor. @ 2230 visual, pt sleeping. Will continue to monitor. @ 2579-0025 Rounding, pt with comlaints of pain, medication per MAR. Pt with no other needs noted, call bell within easy reach, will continue to monitor. @ 7584-7067 Assessment completed see flow sheet. Pt remains with no complaints of pain. VSS, NSR via monitor, chest rise and fall equally via room air. Pulses to BLE palpable, skin warm to touch. Call bell placed back within easy reach, will continue to monitor. 10/23/18 @ 0963-3434 Bedside verbal report rcv'd from [place], [address] [place] [address]. Pt in bed alert and verbal with family at bedside. Groin site intact derma bond close. No current needs, no pain, assessment to follow.

## 2018-10-23 NOTE — PROGRESS NOTES
Reason for Admission:   \"RIGHT LEG SUPERFICIAL FEMORAL ENDARTERECTOMY/RIGHT LOWER EXTREMITY ANGIOGRAM/POSSIBLE STENT/RIGHT COMMON FEMORAL ARTERY TO RIGHT ABOVE KNEE POPLITEAL GREATER SAPHENOUS VEIN BYPASS\" as per operative note. RRAT Score:    5                 Plan for utilizing home health:      No home health orders at this time. Likelihood of Readmission:  Low                         Transition of Care Plan:    The patient plans to return home to his 1 level (no steps) apartment where his brother lives with him. He expects his sister and Gene Styles, son to check in after he's discharged. His sister will provide transportation upon discharge. He states that he can obtain his medications from the pharmacy and take his medications as directed. He ambulates w/o dme, and he reports independence with his ADL/IADLs. He has no concerns for discharge at this time. Care Management Interventions  PCP Verified by CM: Yes  Last Visit to PCP: 10/22/18  Mode of Transport at Discharge: Self  Transition of Care Consult (CM Consult): Discharge Planning  Discharge Durable Medical Equipment: No  Current Support Network:  Other(His brother lives with him)  Confirm Follow Up Transport: Family(Richie, sister can provide transportation upon discharge)  Plan discussed with Pt/Family/Caregiver: Yes  Discharge Location  Discharge Placement: Home with family assistance

## 2018-10-23 NOTE — ANESTHESIA PREPROCEDURE EVALUATION
Anesthetic History   No history of anesthetic complications            Review of Systems / Medical History  Patient summary reviewed and pertinent labs reviewed    Pulmonary    COPD               Neuro/Psych   Within defined limits           Cardiovascular    Hypertension          CAD and cardiac stents    Exercise tolerance: <4 METS     GI/Hepatic/Renal  Within defined limits              Endo/Other        Arthritis     Other Findings              Physical Exam    Airway  Mallampati: III  TM Distance: 4 - 6 cm  Neck ROM: normal range of motion   Mouth opening: Normal     Cardiovascular  Regular rate and rhythm,  S1 and S2 normal,  no murmur, click, rub, or gallop             Dental    Dentition: Full upper dentures and Poor dentition     Pulmonary  Breath sounds clear to auscultation               Abdominal  GI exam deferred       Other Findings            Anesthetic Plan    ASA: 2  Anesthesia type: general          Induction: Intravenous  Anesthetic plan and risks discussed with: Patient and Family

## 2018-10-23 NOTE — PROGRESS NOTES
Problem: Falls - Risk of  Goal: *Absence of Falls  Document Cathy Fall Risk and appropriate interventions in the flowsheet. Fall Risk Interventions:            Medication Interventions: Evaluate medications/consider consulting pharmacy, Patient to call before getting OOB, Teach patient to arise slowly                  Comments: Pt will remain free from falls by calling for assistance before ambulating.

## 2018-10-23 NOTE — PROGRESS NOTES
conducted an initial consultation and Spiritual Assessment for Dave Paredes, who is a 58 y.o.,male. Patients Primary Language is: Kayden Ocasio. According to the patients EMR Advent Affiliation is: Spiritism. The reason the Patient came to the hospital is:   Patient Active Problem List    Diagnosis Date Noted    Atherosclerosis of native artery of both lower extremities with intermittent claudication ([facility] [address] 07/25/2018    Right inguinal hernia 10/04/2017    Iliac artery aneurysm, left ([address] 12/12/2016    PAD (peripheral artery disease) ([address] 08/29/2016    Aortic ectasia, abdominal ([facility] [address] 08/29/2016    Sepsis(995.91) 09/12/2013    Immunocompromised state ([facility] [address] 09/12/2013    Anemia, unspecified 09/12/2013    [facility] 09/12/2013        The  provided the following Interventions:  Initiated a relationship of care and support. Explored issues of trinidad, belief, spirituality and Druze/ritual needs while hospitalized. Listened empathically. Provided chaplaincy education. Provided information about Spiritual Care Services. Offered prayer and assurance of continued prayers on patient's behalf. Chart reviewed. The following outcomes where achieved:  Patient shared limited information about both his medical narrative and spiritual journey/beliefs.  confirmed Patient's Spiritism Advent Affiliation. Patient processed feeling about current hospitalization. Patient expressed gratitude for 's visit. Assessment:  Patient hadsome discomfort at the time of visit and admitted that he was still too groggy to hold conversation well. Nurse had been notified and will follow up patient. Patient does not have any Druze/cultural needs that will affect patients preferences in health care. There are no spiritual or Druze issues which require intervention at this time.      Plan:    Chaplains will continue to follow and will provide pastoral care on an as needed/requested basis.  recommends bedside caregivers page  on duty if patient shows signs of acute spiritual or emotional distress.     Chaplain Resident [address]   [phone]

## 2018-10-23 NOTE — ROUTINE PROCESS
TRANSFER - OUT REPORT:    Verbal report given to Jasmin Cruz on Gene JAZZY Polanco.  being transferred to CVT(unit) for routine progression of care       Report consisted of patients Situation, Background, Assessment and   Recommendations(SBAR). Information from the following report(s) SBAR was reviewed with the receiving nurse. Lines:   Peripheral IV 10/23/18 Right Antecubital (Active)   Site Assessment Clean, dry, & intact 10/23/2018  9:14 AM   Phlebitis Assessment 0 10/23/2018  9:14 AM   Infiltration Assessment 0 10/23/2018  9:14 AM   Dressing Status Clean, dry, & intact 10/23/2018  9:14 AM   Dressing Type Tape;Transparent 10/23/2018  9:14 AM   Hub Color/Line Status Pink; Infusing 10/23/2018  9:14 AM        Opportunity for questions and clarification was provided. Patient transported with:   Tech     Dr. Shara Eisenmenger and Greer Lewis stated ready to receive patient.

## 2018-10-24 VITALS
DIASTOLIC BLOOD PRESSURE: 87 MMHG | BODY MASS INDEX: 26.49 KG/M2 | RESPIRATION RATE: 19 BRPM | HEART RATE: 77 BPM | HEIGHT: 72 IN | OXYGEN SATURATION: 91 % | SYSTOLIC BLOOD PRESSURE: 125 MMHG | WEIGHT: 195.55 LBS | TEMPERATURE: 98.7 F

## 2018-10-24 LAB
ANION GAP SERPL CALC-SCNC: 6 MMOL/L (ref 3–18)
BUN SERPL-MCNC: 10 MG/DL (ref 7–18)
BUN/CREAT SERPL: 13 (ref 12–20)
CALCIUM SERPL-MCNC: 8 MG/DL (ref 8.5–10.1)
CHLORIDE SERPL-SCNC: 108 MMOL/L (ref 100–108)
CO2 SERPL-SCNC: 25 MMOL/L (ref 21–32)
CREAT SERPL-MCNC: 0.78 MG/DL (ref 0.6–1.3)
ERYTHROCYTE [DISTWIDTH] IN BLOOD BY AUTOMATED COUNT: 15.1 % (ref 11.6–14.5)
GLUCOSE SERPL-MCNC: 122 MG/DL (ref 74–99)
HCT VFR BLD AUTO: 35.3 % (ref 36–48)
HGB BLD-MCNC: 11.3 G/DL (ref 13–16)
MCH RBC QN AUTO: 25.6 PG (ref 24–34)
MCHC RBC AUTO-ENTMCNC: 32 G/DL (ref 31–37)
MCV RBC AUTO: 80 FL (ref 74–97)
PLATELET # BLD AUTO: 183 K/UL (ref 135–420)
PMV BLD AUTO: 9.4 FL (ref 9.2–11.8)
POTASSIUM SERPL-SCNC: 4.7 MMOL/L (ref 3.5–5.5)
RBC # BLD AUTO: 4.41 M/UL (ref 4.7–5.5)
SODIUM SERPL-SCNC: 139 MMOL/L (ref 136–145)
WBC # BLD AUTO: 11.1 K/UL (ref 4.6–13.2)

## 2018-10-24 PROCEDURE — 74011250637 HC RX REV CODE- 250/637: Performed by: SURGERY

## 2018-10-24 PROCEDURE — 74011250637 HC RX REV CODE- 250/637: Performed by: PHYSICIAN ASSISTANT

## 2018-10-24 PROCEDURE — 80048 BASIC METABOLIC PNL TOTAL CA: CPT | Performed by: SURGERY

## 2018-10-24 PROCEDURE — 74011250636 HC RX REV CODE- 250/636: Performed by: SURGERY

## 2018-10-24 PROCEDURE — 85027 COMPLETE CBC AUTOMATED: CPT | Performed by: SURGERY

## 2018-10-24 PROCEDURE — 36415 COLL VENOUS BLD VENIPUNCTURE: CPT | Performed by: SURGERY

## 2018-10-24 RX ORDER — OXYCODONE AND ACETAMINOPHEN 5; 325 MG/1; MG/1
1 TABLET ORAL
Qty: 30 TAB | Refills: 0 | Status: SHIPPED | OUTPATIENT
Start: 2018-10-24 | End: 2019-01-01

## 2018-10-24 RX ORDER — CLOPIDOGREL BISULFATE 75 MG/1
75 TABLET ORAL DAILY
Qty: 30 TAB | Refills: 3 | Status: SHIPPED | OUTPATIENT
Start: 2018-10-24 | End: 2019-06-03

## 2018-10-24 RX ORDER — CHLORPROMAZINE HYDROCHLORIDE 25 MG/1
25 TABLET, FILM COATED ORAL
Status: DISCONTINUED | OUTPATIENT
Start: 2018-10-24 | End: 2018-10-24 | Stop reason: HOSPADM

## 2018-10-24 RX ADMIN — ATORVASTATIN CALCIUM 20 MG: 20 TABLET, FILM COATED ORAL at 09:00

## 2018-10-24 RX ADMIN — HEPARIN SODIUM 5000 UNITS: 5000 INJECTION, SOLUTION INTRAVENOUS; SUBCUTANEOUS at 06:25

## 2018-10-24 RX ADMIN — OXYCODONE HYDROCHLORIDE AND ACETAMINOPHEN 1 TABLET: 5; 325 TABLET ORAL at 09:20

## 2018-10-24 RX ADMIN — PANTOPRAZOLE SODIUM 40 MG: 40 TABLET, DELAYED RELEASE ORAL at 08:30

## 2018-10-24 RX ADMIN — LEFLUNOMIDE 20 MG: 20 TABLET, FILM COATED ORAL at 09:00

## 2018-10-24 RX ADMIN — DOCUSATE SODIUM 100 MG: 100 CAPSULE, LIQUID FILLED ORAL at 09:00

## 2018-10-24 RX ADMIN — ASPIRIN 325 MG: 325 TABLET ORAL at 09:00

## 2018-10-24 RX ADMIN — Medication 10 ML: at 06:25

## 2018-10-24 RX ADMIN — CHLORPROMAZINE HYDROCHLORIDE 25 MG: 25 TABLET, SUGAR COATED ORAL at 12:09

## 2018-10-24 RX ADMIN — HYDROXYCHLOROQUINE SULFATE 200 MG: 200 TABLET, FILM COATED ENTERAL at 09:00

## 2018-10-24 RX ADMIN — CLOPIDOGREL 75 MG: 75 TABLET, FILM COATED ORAL at 09:00

## 2018-10-24 NOTE — PROGRESS NOTES
Bedside and Verbal shift change report given to Michelle Us RN (oncoming nurse) by JOHN Snyder RN (offgoing nurse). Report given with SBAR, Kardex, Intake/Output and Recent Results.

## 2018-10-24 NOTE — PROGRESS NOTES
5191 SBAR received from [address] [place] [address],[facility]. Initial assessment completed. Rt groin incision site CDI with no s/s of bleeding or hematoma formation noted. Areas of ecchymosis noted rt leg warm and dry, pal pedal pulses noted. 0900 up in [place] to walk dylan with no assist.    0920 c/o pain 7/10 medicated as ordered. 1005 states pain is 4/10, denies distress. Cont to have hiccups, notified PA Arlette Kanner, orders written. 1130 thorazine 25mg po given as ordered. 1215 d/c instructions given to patient and brother. D/c home in stable cond.

## 2018-10-24 NOTE — DISCHARGE INSTRUCTIONS
DISCHARGE SUMMARY from Nurse    PATIENT INSTRUCTIONS:    After general anesthesia or intravenous sedation, for 24 hours or while taking prescription Narcotics:  · Limit your activities  · Do not drive and operate hazardous machinery  · Do not make important personal or business decisions  · Do  not drink alcoholic beverages  · If you have not urinated within 8 hours after discharge, please contact your surgeon on call. Report the following to your surgeon:  · Excessive pain, swelling, redness or odor of or around the surgical area  · Temperature over 100.5  · Nausea and vomiting lasting longer than 4 hours or if unable to take medications  · Any signs of decreased circulation or nerve impairment to extremity: change in color, persistent  numbness, tingling, coldness or increase pain  · Any questions    What to do at Home:  Recommended activity: Activity as tolerated and no driving for today, No lifting, Driving, or Strenuous exercise for until seen by MD, No driving while on analgesics and See surgical instructions, no driving until seen by MD    If you experience any of the following symptoms nausea,vomiting, bleeding at site please follow up with emergency room for bleeding or MD.    *  Please give a list of your current medications to your Primary Care Provider. *  Please update this list whenever your medications are discontinued, doses are      changed, or new medications (including over-the-counter products) are added. *  Please carry medication information at all times in case of emergency situations. These are general instructions for a healthy lifestyle:    No smoking/ No tobacco products/ Avoid exposure to second hand smoke  Surgeon General's Warning:  Quitting smoking now greatly reduces serious risk to your health.     Obesity, smoking, and sedentary lifestyle greatly increases your risk for illness    A healthy diet, regular physical exercise & weight monitoring are important for maintaining a healthy lifestyle    You may be retaining fluid if you have a history of heart failure or if you experience any of the following symptoms:  Weight gain of 3 pounds or more overnight or 5 pounds in a week, increased swelling in our hands or feet or shortness of breath while lying flat in bed. Please call your doctor as soon as you notice any of these symptoms; do not wait until your next office visit. Recognize signs and symptoms of STROKE:    F-face looks uneven    A-arms unable to move or move unevenly    S-speech slurred or non-existent    T-time-call 911 as soon as signs and symptoms begin-DO NOT go       Back to bed or wait to see if you get better-TIME IS BRAIN. Warning Signs of HEART ATTACK     Call 911 if you have these symptoms:   Chest discomfort. Most heart attacks involve discomfort in the center of the chest that lasts more than a few minutes, or that goes away and comes back. It can feel like uncomfortable pressure, squeezing, fullness, or pain.  Discomfort in other areas of the upper body. Symptoms can include pain or discomfort in one or both arms, the back, neck, jaw, or stomach.  Shortness of breath with or without chest discomfort.  Other signs may include breaking out in a cold sweat, nausea, or lightheadedness. Don't wait more than five minutes to call 911 - MINUTES MATTER! Fast action can save your life. Calling 911 is almost always the fastest way to get lifesaving treatment. Emergency Medical Services staff can begin treatment when they arrive -- up to an hour sooner than if someone gets to the hospital by car. The discharge information has been reviewed with the patient and caregiver. The patient and caregiver verbalized understanding.   Discharge medications reviewed with the patient and caregiver and appropriate educational materials and side effects teaching were provided.   ___________________________________________________________________________________________________________________________________

## 2018-10-24 NOTE — DISCHARGE SUMMARY
Physician Discharge Summary     Patient ID:  Ciro Goodman  [id number]  58 y.o.  1955    Admit date: 10/23/2018    Discharge date: 10/24/2018      Admitting Physician: Stiven Long MD     Discharge Physician: Stiven Long MD     Admission Diagnoses: ATHEROSCLEROSIS BILATERAL EXTREMITIES WITH CLAUDICATION  Atherosclerosis of native artery of both lower extremities with intermittent claudication [facility])  PAD (peripheral artery disease) [facility])    Discharge Diagnoses: There are no discharge diagnoses documented for the most recent discharge. Procedures for this admission: Procedure(s):  RIGHT FEMORAL ENDARTERECTOMY WITH FEMORAL ARTERY BALLOON ANGIOPLASTY AND STENT PLACEMENT    Discharged Condition: stable    Hospital Course: Jose Al Jr. went to the Operating Room and underwent right common femoral endarterectomy. Patient has no ischemic rest pain and also has otherwise done well and has recovered uneventfully postop in CVT ICU    Discharge Exam: GEN: alert, cooperative, no distress, appears stated age  LUNG: clear to auscultation bilaterally  HEART: regular rate and rhythm, S1, S2 normal, no murmur, click, rub or gallop  EXTREMITIES:   Right leg warm and well perfused, no edema  Incision:  Right groin incision c/d/i but some mild edema and ecchymosis    Disposition: home    Patient Instructions:   Current Discharge Medication List      START taking these medications    Details   clopidogrel (PLAVIX) 75 mg tab Take 1 Tab by mouth daily. Qty: 30 Tab, Refills: 3      oxyCODONE-acetaminophen (PERCOCET) 5-325 mg per tablet Take 1 Tab by mouth every four (4) hours as needed. Max Daily Amount: 6 Tabs. Qty: 30 Tab, Refills: 0    Associated Diagnoses: PAD (peripheral artery disease) ([address] Acute post-operative pain         CONTINUE these medications which have NOT CHANGED    Details   hydroxychloroquine (PLAQUENIL) 200 mg tablet Take 200 mg by mouth daily.       leflunomide (ARAVA) 20 mg tablet Take 20 mg by mouth daily. predniSONE (DELTASONE) 5 mg tablet Take  by mouth daily. Take 1 to 3 tabs daily      albuterol (PROVENTIL HFA, VENTOLIN HFA, PROAIR HFA) 90 mcg/actuation inhaler Take 2 Puffs by inhalation every four (4) hours as needed for Wheezing or Shortness of Breath. Indications: BRONCHOSPASM PREVENTION, Chronic Obstructive Pulmonary Disease  Qty: 1 Inhaler, Refills: 0      atorvastatin (LIPITOR) 20 mg tablet Take  by mouth daily. cholecalciferol, VITAMIN D3, (VITAMIN D3) 5,000 unit tab tablet Take 5,000 Units by mouth daily. esomeprazole (NEXIUM) 20 mg capsule Take 20 mg by mouth daily. aspirin (ASPIRIN) 325 mg tablet Take 325 mg by mouth daily. STOP taking these medications       ketorolac (TORADOL) 10 mg tablet Comments:   Reason for Stopping:               Reference discharge instructions as provided by nursing for diet, labs, medications, activity, wound care and any outpatient referrals.     Follow-up with vascular clinic in 10-14 days     Signed:  Dairl Boast, PA  10/24/2018  8:05 AM

## 2018-10-24 NOTE — OP NOTES
[address] [place] [address] Dr  OPERATIVE REPORT    Emil Jennifer, [facility].  MR#: [id number]  : 1955  ACCOUNT #: [de-identified]   DATE OF SERVICE: 10/23/2018    SURGEON:  Gene Reese MD      PREOPERATIVE DIAGNOSIS:  Claudication of the right lower extremity with peripheral arterial disease. POSTOPERATIVE DIAGNOSIS:  Claudication of the right lower extremity with peripheral arterial disease. PROCEDURES PERFORMED:    1. Right common femoral artery endarterectomy and patch angioplasty. 2.  Remote endarterectomy of the right superficial femoral artery and above knee popliteal artery using 7 mm endarterectomy tool. 3.  Patch angioplasty using Axios patch. 4.  Right lower extremity angiogram.    5.  Transluminal intravascular placement of stent within the above knee popliteal artery and superficial femoral artery, 7 x 150 Viabahn stents x2. CULTURES:  None. SPECIMENS REMOVED:  None. DRAINS:  None. ESTIMATED BLOOD LOSS:  200 mL. ANESTHESIA:  General.      ASSISTANT:  None. IMPLANTS:  As above. COMPLICATIONS:  Unable to remove the entirety of the plaque, so we then had to place our stents in place. A sheath also back walled through the superficial femoral artery, which was repaired primarily. INDICATIONS FOR THE PROCEDURE:  The patient is a 76-year-old gentleman with claudication of the right lower extremity. The patient was given the risks and benefits of the procedure including but not limited to bleeding, infection, damage to adjacent structures, MI, stroke, and death as well as loss of lower extremity, need for further surgery. The patient was understanding of all the risks and underwent the procedure. PROCEDURE:  The patient was correctly identified in the preoperative holding area and taken to the operating room in stable condition. The patient had a preincision timeout prior to the incision.   The patient was prepped and draped in a sterile fashion according to CDC guidelines using aseptic technique. Patient also has had preoperative antibiotics prior to any incision. We then were able to make an incision obliquely overlying the common femoral artery, dissected down to the common femoral artery. We looped the common femoral artery with a red Vesseloop in a Garcia fashion both proximally as well as distally around the profunda femoris artery and the superficial femoral artery about 5 or 6 cm away from the bifurcation. We then heparinized the patient appropriately, created an arteriotomy using 11 blade and Garcia scissors, and then performed our endarterectomy of the distal common femoral artery, removed it out of both profunda branches as well as the SFA and then measured showing need for a 7 mm endarterectomy tool. We did this. The tool seemed a little broken, but we were able to kind of get it to work, so we were able to get it down. We did have some good backbleeding. We were down where we figured it would be opened at, and then we were able to click our tool and had some difficulty pulling back, and we were able to remove about half the plaque, but half did still remain so we then were able to place a 6-Jordanian sheath and this had some difficulty at first, and then we noticed that there was some extravasation of contrast so we dissected everything out further, removed our sheath, and slowly placed our sheath in a little bit further, and then we were able to perform the angio, saw where the plaque was. We were able to get around it using a Ramirez catheter and an angled Glidewire, confirmed that we were within the popliteal artery with the angiogram, and then we were able to place a V-18 wire in and then stent using a 7 x 150 Viabahn and an additional 7 x 150 Viabahn. This went over from the mid above knee popliteal artery all the way to the kind of proximal superficial femoral artery. We postdilated with a 7 mm balloon.   Repeat angiogram shows complete resolution of the occlusion. There is no stenosis within the above knee or below knee popliteal artery. There is no stenosis within the proximal anterior tibial artery, tibioperoneal trunk artery, posterior tibial artery, or the peroneal artery. The patient had palpable pulses once we were completed with the procedure. So we then removed our access. We brought up our patch. We cut it to appropriate size and then created an anastomosis using 5-0 Prolene suture. Four repair sutures were required. We did finish the endarterectomy on the posterior plaque of the common femoral artery prior to closure to be able to remove all the leftover plaque within the common femoral artery itself. Patient tolerated the procedure well, again had distal palpable pulses. We copiously irrigated the wound, closed with a multilayer 3-0 Vicryl suture in layers including the deep dermal and then a 4-0 Vicryl subcuticular layer for skin closure and Dermabond for dressing. The patient tolerated the procedure well without any issues.       MD ADONIS Stevens /   D: 10/23/2018 15:30     T: 10/24/2018 05:35  JOB #: [id number]

## 2018-10-24 NOTE — PROGRESS NOTES
Care Management Interventions  PCP Verified by CM: Yes  Last Visit to PCP: 10/22/18  Mode of Transport at Discharge: Self  Transition of Care Consult (CM Consult): Discharge Planning  Discharge Durable Medical Equipment: No  Current Support Network:  Other(His brother lives with him)  Confirm Follow Up Transport: Family(Richie, sister can provide transportation upon discharge)  Plan discussed with Pt/Family/Caregiver: Yes  Discharge Location  Discharge Placement: Home with family assistance

## 2018-10-26 ENCOUNTER — APPOINTMENT (OUTPATIENT)
Dept: GENERAL RADIOLOGY | Age: 63
End: 2018-10-26
Attending: EMERGENCY MEDICINE
Payer: MEDICARE

## 2018-10-26 ENCOUNTER — HOSPITAL ENCOUNTER (EMERGENCY)
Age: 63
Discharge: HOME | End: 2018-10-26
Attending: EMERGENCY MEDICINE
Payer: MEDICARE

## 2018-10-26 VITALS
TEMPERATURE: 98 F | OXYGEN SATURATION: 98 % | WEIGHT: 192 LBS | RESPIRATION RATE: 18 BRPM | BODY MASS INDEX: 26.01 KG/M2 | DIASTOLIC BLOOD PRESSURE: 70 MMHG | SYSTOLIC BLOOD PRESSURE: 132 MMHG | HEART RATE: 93 BPM | HEIGHT: 72 IN

## 2018-10-26 DIAGNOSIS — R06.6 HICCOUGHS: Primary | ICD-10-CM

## 2018-10-26 LAB
ALBUMIN SERPL-MCNC: 3.4 G/DL (ref 3.4–5)
ALBUMIN/GLOB SERPL: 1 {RATIO} (ref 0.8–1.7)
ALP SERPL-CCNC: 87 U/L (ref 45–117)
ALT SERPL-CCNC: 15 U/L (ref 16–61)
ANION GAP SERPL CALC-SCNC: 9 MMOL/L (ref 3–18)
AST SERPL-CCNC: 16 U/L (ref 15–37)
BASOPHILS # BLD: 0 K/UL (ref 0–0.1)
BASOPHILS NFR BLD: 0 % (ref 0–2)
BILIRUB SERPL-MCNC: 0.6 MG/DL (ref 0.2–1)
BUN SERPL-MCNC: 8 MG/DL (ref 7–18)
BUN/CREAT SERPL: 8 (ref 12–20)
CALCIUM SERPL-MCNC: 8.8 MG/DL (ref 8.5–10.1)
CHLORIDE SERPL-SCNC: 101 MMOL/L (ref 100–108)
CK MB CFR SERPL CALC: 2 % (ref 0–4)
CK MB SERPL-MCNC: 1.5 NG/ML (ref 5–25)
CK SERPL-CCNC: 74 U/L (ref 39–308)
CO2 SERPL-SCNC: 28 MMOL/L (ref 21–32)
CREAT SERPL-MCNC: 1.02 MG/DL (ref 0.6–1.3)
DIFFERENTIAL METHOD BLD: ABNORMAL
EOSINOPHIL # BLD: 0.2 K/UL (ref 0–0.4)
EOSINOPHIL NFR BLD: 2 % (ref 0–5)
ERYTHROCYTE [DISTWIDTH] IN BLOOD BY AUTOMATED COUNT: 15.6 % (ref 11.6–14.5)
GLOBULIN SER CALC-MCNC: 3.4 G/DL (ref 2–4)
GLUCOSE SERPL-MCNC: 90 MG/DL (ref 74–99)
HCT VFR BLD AUTO: 41.3 % (ref 36–48)
HGB BLD-MCNC: 13.2 G/DL (ref 13–16)
LYMPHOCYTES # BLD: 1.8 K/UL (ref 0.9–3.6)
LYMPHOCYTES NFR BLD: 22 % (ref 21–52)
MCH RBC QN AUTO: 26.5 PG (ref 24–34)
MCHC RBC AUTO-ENTMCNC: 32 G/DL (ref 31–37)
MCV RBC AUTO: 82.8 FL (ref 74–97)
MONOCYTES # BLD: 0.6 K/UL (ref 0.05–1.2)
MONOCYTES NFR BLD: 7 % (ref 3–10)
NEUTS SEG # BLD: 5.6 K/UL (ref 1.8–8)
NEUTS SEG NFR BLD: 69 % (ref 40–73)
PLATELET # BLD AUTO: 201 K/UL (ref 135–420)
PMV BLD AUTO: 9.8 FL (ref 9.2–11.8)
POTASSIUM SERPL-SCNC: 4.1 MMOL/L (ref 3.5–5.5)
PROT SERPL-MCNC: 6.8 G/DL (ref 6.4–8.2)
RBC # BLD AUTO: 4.99 M/UL (ref 4.7–5.5)
SODIUM SERPL-SCNC: 138 MMOL/L (ref 136–145)
TROPONIN I SERPL-MCNC: <0.02 NG/ML (ref 0–0.06)
WBC # BLD AUTO: 8.2 K/UL (ref 4.6–13.2)

## 2018-10-26 PROCEDURE — 82550 ASSAY OF CK (CPK): CPT | Performed by: EMERGENCY MEDICINE

## 2018-10-26 PROCEDURE — 71045 X-RAY EXAM CHEST 1 VIEW: CPT

## 2018-10-26 PROCEDURE — 96372 THER/PROPH/DIAG INJ SC/IM: CPT

## 2018-10-26 PROCEDURE — 80053 COMPREHEN METABOLIC PANEL: CPT | Performed by: EMERGENCY MEDICINE

## 2018-10-26 PROCEDURE — 74011250636 HC RX REV CODE- 250/636: Performed by: EMERGENCY MEDICINE

## 2018-10-26 PROCEDURE — 74011250637 HC RX REV CODE- 250/637: Performed by: EMERGENCY MEDICINE

## 2018-10-26 PROCEDURE — 85025 COMPLETE CBC W/AUTO DIFF WBC: CPT | Performed by: EMERGENCY MEDICINE

## 2018-10-26 PROCEDURE — 93005 ELECTROCARDIOGRAM TRACING: CPT

## 2018-10-26 PROCEDURE — 99285 EMERGENCY DEPT VISIT HI MDM: CPT

## 2018-10-26 RX ORDER — CHLORPROMAZINE HYDROCHLORIDE 25 MG/ML
50 INJECTION INTRAMUSCULAR ONCE
Status: COMPLETED | OUTPATIENT
Start: 2018-10-26 | End: 2018-10-26

## 2018-10-26 RX ORDER — BACLOFEN 10 MG/1
5 TABLET ORAL
Status: DISCONTINUED | OUTPATIENT
Start: 2018-10-26 | End: 2018-10-26

## 2018-10-26 RX ORDER — CYCLOBENZAPRINE HCL 10 MG
10 TABLET ORAL
Status: COMPLETED | OUTPATIENT
Start: 2018-10-26 | End: 2018-10-26

## 2018-10-26 RX ORDER — CHLORPROMAZINE HYDROCHLORIDE 10 MG/1
20 TABLET, FILM COATED ORAL
Qty: 10 TAB | Refills: 0 | Status: SHIPPED | OUTPATIENT
Start: 2018-10-26

## 2018-10-26 RX ADMIN — CHLORPROMAZINE HYDROCHLORIDE 50 MG: 25 INJECTION INTRAMUSCULAR at 12:10

## 2018-10-26 RX ADMIN — CYCLOBENZAPRINE HYDROCHLORIDE 10 MG: 10 TABLET, FILM COATED ORAL at 13:37

## 2018-10-26 NOTE — ED TRIAGE NOTES
Pt reports shortness of breath last night while sitting on the couch, hiccups day after surgical procedure on Tuesday this week, hx of hiccups after surgery and worsens at night; denies any complications with surgical site, right groin accessed for bypass of right leg but was not needed once accessed

## 2018-10-26 NOTE — ED PROVIDER NOTES
EMERGENCY DEPARTMENT HISTORY AND PHYSICAL EXAM    11:41 AM      Date: 10/26/2018  Patient Name: Elizabeth Alanis. History of Presenting Illness     Chief Complaint   Patient presents with    Shortness of Breath    Aphagia         History Provided By: Patient    Chief Complaint: Hiccoughs  Duration: 2 Days  Timing:  Constant  Quality: \"throat closing\"  Severity: Moderate  Modifying Factors: Sx temporarily relieved when taking Thorazine and Oxycodone. Previously occurred following anesthesia. Associated Symptoms: SOB      Additional History (Context): 12:22 PM Gene F Dali Wheat is a 58 y.o. male with h/o COPD and smoking presents to ED complaining of constant, moderate hiccoughs which started to occur 2 days ago with SOB. He describes it as \"throat closing\" and that sx are worsened at night. Pt records that he had 2 stents placed in his femoral artery 3 days ago, and that sx began to appear following anesthesia from the procedure. Pt states that he has had hiccoughs in the past following anesthesia. Pt adds that he was prescribed Thorazine 2 years ago which alleviates sx when taken; he claims to have taken \"4\" yesterday. Also adds taking Oxycodone this morning about x 3 hours ago. NKDA. Pt notes bruising in femoral area. Pt is on Plavix. Denies chest pain, cough, wheezing, fever, and vomiting. No other concerns or symptoms at this time.       PCP: Jovanna Gray MD      Past History     Past Medical History:  Past Medical History:   Diagnosis Date    Arthritis     CAD (coronary artery disease)     Cancer of bone ([address]     COPD     Heart failure ([address]     stents x2 in 2008    Hypertension     Non Hodgkin's lymphoma ([address] 2013    had chemo and rad       Past Surgical History:  Past Surgical History:   Procedure Laterality Date    CARDIAC SURG PROCEDURE UNLIST      Stented x2- 2008    HX CORONARY STENT PLACEMENT  6-5-13    HX HEART CATHETERIZATION  6-5-13    HX HEENT      Deviated septum,     HX OTHER SURGICAL      skin cancer excision face    REPAIR ING HERNIA,5+Y/O,REDUCIBL Right 2017    Dr. Dayna Clarke       Family History:  Family History   Problem Relation Age of Onset    Stroke Mother     Heart Disease Father        Social History:  Social History     Tobacco Use    Smoking status: Former Smoker     Packs/day: 2.00     Years: 39.00     Pack years: 78.00     Last attempt to quit: 8/10/2012     Years since quittin.2    Smokeless tobacco: Never Used   Substance Use Topics    Alcohol use: No    Drug use: No       Allergies:  No Known Allergies      Review of Systems       Review of Systems   Constitutional: Negative for fever. HENT: Negative for sore throat. Eyes: Negative for redness and visual disturbance. Respiratory: Positive for shortness of breath. Negative for cough and wheezing. Cardiovascular: Negative for chest pain. Gastrointestinal: Negative for abdominal pain, nausea and vomiting. Endocrine: Negative for polyuria. Genitourinary: Negative for dysuria. Musculoskeletal: Negative for arthralgias and neck stiffness. Skin: Negative for rash. Neurological: Negative for headaches. Hematological: Bruises/bleeds easily. All other systems reviewed and are negative. Physical Exam     Visit Vitals  /65   Pulse 89   Temp 98 °F (36.7 °C)   Resp 16   Ht 6' (1.829 m)   Wt 87.1 kg (192 lb)   SpO2 97%   BMI 26.04 kg/m²         Physical Exam   Constitutional: He is oriented to person, place, and time. He appears well-developed and well-nourished. No distress. Actively, continuous hiccoughs bedside   HENT:   Head: Normocephalic and atraumatic. Mouth/Throat: Oropharynx is clear and moist.   Eyes: Conjunctivae are normal. Pupils are equal, round, and reactive to light. No scleral icterus. Neck: Normal range of motion. Neck supple. Cardiovascular: Normal rate and intact distal pulses.    Capillary refill < 3 seconds   Pulmonary/Chest: Effort normal and breath sounds normal. No respiratory distress. He has no wheezes. He has no rales. Lungs clear   Abdominal: Soft. Bowel sounds are normal. He exhibits no distension. There is no tenderness. Musculoskeletal: Normal range of motion. He exhibits no edema. Lymphadenopathy:     He has no cervical adenopathy. Neurological: He is alert and oriented to person, place, and time. No cranial nerve deficit. Skin: Skin is warm and dry. Bruising noted. He is not diaphoretic. No erythema. Post-surgical wound right femoral area  No dehiscence  No drainage  No erythema   Some bruising femoral area   Nursing note and vitals reviewed. Diagnostic Study Results     Labs -  Recent Results (from the past 12 hour(s))   EKG, 12 LEAD, INITIAL    Collection Time: 10/26/18 11:40 AM   Result Value Ref Range    Ventricular Rate 87 BPM    Atrial Rate 87 BPM    P-R Interval 154 ms    QRS Duration 82 ms    Q-T Interval 362 ms    QTC Calculation (Bezet) 435 ms    Calculated P Axis 35 degrees    Calculated R Axis 13 degrees    Calculated T Axis 51 degrees    Diagnosis       Normal sinus rhythm  Nonspecific ST and T wave abnormality  Abnormal ECG  When compared with ECG of 10-DEC-2017 09:57,  Vent. rate has increased BY  38 BPM  Nonspecific T wave abnormality now evident in Anterior leads     CBC WITH AUTOMATED DIFF    Collection Time: 10/26/18 11:45 AM   Result Value Ref Range    WBC 8.2 4.6 - 13.2 K/uL    RBC 4.99 4.70 - 5.50 M/uL    HGB 13.2 13.0 - 16.0 g/dL    HCT 41.3 36.0 - 48.0 %    MCV 82.8 74.0 - 97.0 FL    MCH 26.5 24.0 - 34.0 PG    MCHC 32.0 31.0 - 37.0 g/dL    RDW 15.6 (H) 11.6 - 14.5 %    PLATELET 442 563 - 091 K/uL    MPV 9.8 9.2 - 11.8 FL    NEUTROPHILS 69 40 - 73 %    LYMPHOCYTES 22 21 - 52 %    MONOCYTES 7 3 - 10 %    EOSINOPHILS 2 0 - 5 %    BASOPHILS 0 0 - 2 %    ABS. NEUTROPHILS 5.6 1.8 - 8.0 K/UL    ABS. LYMPHOCYTES 1.8 0.9 - 3.6 K/UL    ABS. MONOCYTES 0.6 0.05 - 1.2 K/UL    ABS. EOSINOPHILS 0.2 0.0 - 0.4 K/UL    ABS. BASOPHILS 0.0 0.0 - 0.1 K/UL    DF AUTOMATED     METABOLIC PANEL, COMPREHENSIVE    Collection Time: 10/26/18 11:45 AM   Result Value Ref Range    Sodium 138 136 - 145 mmol/L    Potassium 4.1 3.5 - 5.5 mmol/L    Chloride 101 100 - 108 mmol/L    CO2 28 21 - 32 mmol/L    Anion gap 9 3.0 - 18 mmol/L    Glucose 90 74 - 99 mg/dL    BUN 8 7.0 - 18 MG/DL    Creatinine 1.02 0.6 - 1.3 MG/DL    BUN/Creatinine ratio 8 (L) 12 - 20      GFR est AA >60 >60 ml/min/1.73m2    GFR est non-AA >60 >60 ml/min/1.73m2    Calcium 8.8 8.5 - 10.1 MG/DL    Bilirubin, total 0.6 0.2 - 1.0 MG/DL    ALT (SGPT) 15 (L) 16 - 61 U/L    AST (SGOT) 16 15 - 37 U/L    Alk. phosphatase 87 45 - 117 U/L    Protein, total 6.8 6.4 - 8.2 g/dL    Albumin 3.4 3.4 - 5.0 g/dL    Globulin 3.4 2.0 - 4.0 g/dL    A-G Ratio 1.0 0.8 - 1.7     CARDIAC PANEL,(CK, CKMB & TROPONIN)    Collection Time: 10/26/18 11:45 AM   Result Value Ref Range    CK 74 39 - 308 U/L    CK - MB 1.5 <3.6 ng/ml    CK-MB Index 2.0 0.0 - 4.0 %    Troponin-I, Qt. <0.02 0.00 - 0.06 NG/ML       Radiologic Studies -   XR CHEST PORT   Final Result         IMPRESSION:  1.  No significant interval change. 2.  No evidence of acute cardiopulmonary process. 3.  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 Medical Decision Making   I am the first provider for this patient. I reviewed the vital signs, available nursing notes, past medical history, past surgical history, family history and social history. Vital Signs-Reviewed the patient's vital signs. Pulse Oximetry Analysis -  100% on room air      EKG: Interpreted by the EP.    Time Interpreted: 11:41am    Rate: 87 bpm   Rhythm: Normal Sinus Rhythm    Interpretation: nml qrs duration, nml qtc, nml axis, no ST elevation, nonspecific t wave change, some artifact     Records Reviewed: Nursing Notes and Old Medical Records (Time of Review: 11:41 AM)    Provider Notes (Medical Decision Making):  MDM  Number of Diagnoses or Management Options  Diagnosis management comments: Hiccups s/p anesthesia, COPD, cardiac. Will check labs EKG, x-ray. Will give thorazine. Reassess. Medications   chlorproMAZINE (THORAZINE) injection 50 mg (50 mg IntraMUSCular Given 10/26/18 1210)   cyclobenzaprine (FLEXERIL) tablet 10 mg (10 mg Oral Given 10/26/18 1337)       ED Course: Progress Notes, Reevaluation, and Consults:  12:55 PM Hiccups significantly improved. Pt was sleeping. Feeling better. Vitals stable. Labs reassuring. Hiccups returned some but still improved from arrival. Did have pt breath into bag, gave muscle relaxer. Plan to dc home with Px thorazine. Fu pcp. I have reassessed the patient. I have discussed the workup, results and plan with the patient and patient is in agreement. Patient is feeling better. Patient will be prescribed thorazine. Patient was discharge in stable condition. Patient was given outpatient follow up. Patient is to return to emergency department if any new or worsening condition. Diagnosis     Clinical Impression:   1. Hiccoughs        Disposition: Discharged. Follow-up Information     Follow up With Specialties Details Why Contact Info    Nabila Washington MD Family Practice Schedule an appointment as soon as possible for a visit in 2 days  [address]      [id number] [place] [address] EMERGENCY DEPT Emergency Medicine  As needed, If symptoms worsen [address] [facility]  [phone]              Medication List      START taking these medications    chlorproMAZINE 10 mg tablet  Commonly known as:  THORAZINE  Take 2 Tabs by mouth every six (6) hours as needed. ASK your doctor about these medications    albuterol 90 mcg/actuation inhaler  Commonly known as:  PROVENTIL HFA, VENTOLIN HFA, PROAIR HFA  Take 2 Puffs by inhalation every four (4) hours as needed for Wheezing or Shortness of Breath.  Indications: BRONCHOSPASM PREVENTION, Chronic Obstructive Pulmonary Disease     aspirin 325 mg tablet  Commonly known as:  ASPIRIN     cholecalciferol (VITAMIN D3) 5,000 unit Tab tablet  Commonly known as:  VITAMIN D3     clopidogrel 75 mg Tab  Commonly known as:  PLAVIX  Take 1 Tab by mouth daily. leflunomide 20 mg tablet  Commonly known as:  ARAVA     LIPITOR 20 mg tablet  Generic drug:  atorvastatin     NexIUM 20 mg capsule  Generic drug:  esomeprazole     oxyCODONE-acetaminophen 5-325 mg per tablet  Commonly known as:  PERCOCET  Take 1 Tab by mouth every four (4) hours as needed. Max Daily Amount: 6 Tabs. PLAQUENIL 200 mg tablet  Generic drug:  hydroxychloroquine     predniSONE 5 mg tablet  Commonly known as:  Will Gomez           Where to Get Your Medications      Information about where to get these medications is not yet available    Ask your nurse or doctor about these medications  · chlorproMAZINE 10 mg tablet       _______________________________    Attestations:  Scribe Attestation     Chioma Bernabe and Sherine Laughlin acting as a scribe for and in the presence of Lala Sequeira DO      October 26, 2018 at 11:41 AM       Provider Attestation:      I personally performed the services described in the documentation, reviewed the documentation, as recorded by the scribe in my presence, and it accurately and completely records my words and actions.  October 26, 2018 at 11:41 AM - Lala Sequeira DO    __________________  _____________

## 2018-10-26 NOTE — DISCHARGE INSTRUCTIONS
Hiccups: Care Instructions  Your Care Instructions    Hiccups occur when a spasm contracts the diaphragm, a large sheet of muscle that separates the chest cavity from the abdominal cavity. The spasm causes an intake of breath that is suddenly stopped by the closure of the vocal cords (glottis). This closure causes the \"hiccup\" sound. A very full stomach can cause hiccups that go away on their own. A full stomach can be caused by things like eating too much food too quickly or swallowing too much air. Most hiccups go away on their own within a few minutes to a few hours and do not require any treatment. Hiccups that last longer than 48 hours are called persistent hiccups. Hiccups that last longer than a month are called intractable hiccups. Both persistent and intractable hiccups may be a sign of a more serious health problem. Follow-up care is a key part of your treatment and safety. Be sure to make and go to all appointments, and call your doctor if you are having problems. It's also a good idea to know your test results and keep a list of the medicines you take. How can you care for yourself at home? · Try these safe and easy home remedies if your hiccups are making you uncomfortable. ? Eat a teaspoon of sugar or honey. ? Hold your breath and count slowly to 10.  ? Quickly drink a glass of cold water. · If your doctor prescribed medicine, take it as directed. Call your doctor if you think you are having a problem with your medicine. To help prevent hiccups  · Take steps to avoid swallowing air:  ? Eat slowly. Avoid gulping food or beverages. ? Chew your food thoroughly before you swallow. ? Avoid drinking through a straw. ? Avoid chewing gum or eating hard candy. ? Do not smoke or use other tobacco products. ? If you wear dentures, check with a dentist to make sure they fit properly. · Do not eat large meals. · Do not drink alcohol.   · Avoid sudden changes in stomach temperature, such as drinking a hot beverage and then a cold beverage. · Avoid emotional stress or excitement. When should you call for help? Call your doctor now or seek immediate medical care if:    · You have trouble swallowing and are unable to swallow food or fluids.     · You have hiccups for more than 2 days.     · You have new symptoms, such as belly pain, constipation, diarrhea, heartburn, or vomiting.    Watch closely for changes in your health, and be sure to contact your doctor if:    · You have trouble swallowing but are able to swallow food and fluids.     · Hiccups occur often and get in the way of your activities.     · You think medicine may be causing your symptoms.     · You do not get better as expected. Where can you learn more? Go to http://messi-thang.info/. Enter Q526 in the search box to learn more about \"Hiccups: Care Instructions. \"  Current as of: November 20, 2017  Content Version: 11.8  © 0930-3714 [employer]. Care instructions adapted under license by [employer] (which disclaims liability or warranty for this information). If you have questions about a medical condition or this instruction, always ask your healthcare professional. [address] any warranty or liability for your use of this information.

## 2018-10-26 NOTE — ED NOTES
NRB placed on pt by Tomasz Marcos RN per MD request to assist with returning hiccups; mask set up only no air

## 2018-10-26 NOTE — ED NOTES
Patient armband removed and shreddedI have reviewed discharge instructions with the patient. The patient and brother verbalized understanding.  rx x 1 given; pt to car via wc

## 2018-10-27 LAB
ATRIAL RATE: 87 BPM
CALCULATED P AXIS, ECG09: 35 DEGREES
CALCULATED R AXIS, ECG10: 13 DEGREES
CALCULATED T AXIS, ECG11: 51 DEGREES
DIAGNOSIS, 93000: NORMAL
P-R INTERVAL, ECG05: 154 MS
Q-T INTERVAL, ECG07: 362 MS
QRS DURATION, ECG06: 82 MS
QTC CALCULATION (BEZET), ECG08: 435 MS
VENTRICULAR RATE, ECG03: 87 BPM

## 2018-10-28 ENCOUNTER — HOSPITAL ENCOUNTER (EMERGENCY)
Age: 63
Discharge: HOME | End: 2018-10-28
Attending: EMERGENCY MEDICINE | Admitting: EMERGENCY MEDICINE
Payer: MEDICARE

## 2018-10-28 VITALS
SYSTOLIC BLOOD PRESSURE: 139 MMHG | HEIGHT: 72 IN | RESPIRATION RATE: 26 BRPM | DIASTOLIC BLOOD PRESSURE: 65 MMHG | OXYGEN SATURATION: 98 % | WEIGHT: 192 LBS | HEART RATE: 92 BPM | BODY MASS INDEX: 26.01 KG/M2 | TEMPERATURE: 98.4 F

## 2018-10-28 DIAGNOSIS — R06.6 HICCUPS: Primary | ICD-10-CM

## 2018-10-28 PROCEDURE — 74011000250 HC RX REV CODE- 250: Performed by: EMERGENCY MEDICINE

## 2018-10-28 PROCEDURE — 99284 EMERGENCY DEPT VISIT MOD MDM: CPT

## 2018-10-28 PROCEDURE — 74011250637 HC RX REV CODE- 250/637: Performed by: EMERGENCY MEDICINE

## 2018-10-28 PROCEDURE — 74011250636 HC RX REV CODE- 250/636: Performed by: EMERGENCY MEDICINE

## 2018-10-28 PROCEDURE — 93005 ELECTROCARDIOGRAM TRACING: CPT

## 2018-10-28 PROCEDURE — 96372 THER/PROPH/DIAG INJ SC/IM: CPT

## 2018-10-28 RX ORDER — CYCLOBENZAPRINE HCL 5 MG
10 TABLET ORAL
Qty: 9 TAB | Refills: 0 | Status: SHIPPED | OUTPATIENT
Start: 2018-10-28

## 2018-10-28 RX ORDER — CYCLOBENZAPRINE HCL 10 MG
10 TABLET ORAL
Status: COMPLETED | OUTPATIENT
Start: 2018-10-28 | End: 2018-10-28

## 2018-10-28 RX ORDER — CHLORPROMAZINE HYDROCHLORIDE 25 MG/ML
25 INJECTION INTRAMUSCULAR ONCE
Status: COMPLETED | OUTPATIENT
Start: 2018-10-28 | End: 2018-10-28

## 2018-10-28 RX ORDER — LIDOCAINE HYDROCHLORIDE 20 MG/ML
15 SOLUTION OROPHARYNGEAL
Status: COMPLETED | OUTPATIENT
Start: 2018-10-28 | End: 2018-10-28

## 2018-10-28 RX ADMIN — LIDOCAINE HYDROCHLORIDE 15 ML: 20 SOLUTION ORAL; TOPICAL at 23:14

## 2018-10-28 RX ADMIN — CYCLOBENZAPRINE HYDROCHLORIDE 10 MG: 10 TABLET, FILM COATED ORAL at 22:40

## 2018-10-28 RX ADMIN — CHLORPROMAZINE HYDROCHLORIDE 25 MG: 25 INJECTION INTRAMUSCULAR at 22:41

## 2018-10-29 LAB
ATRIAL RATE: 88 BPM
CALCULATED P AXIS, ECG09: 27 DEGREES
CALCULATED R AXIS, ECG10: 32 DEGREES
CALCULATED T AXIS, ECG11: 47 DEGREES
DIAGNOSIS, 93000: NORMAL
P-R INTERVAL, ECG05: 150 MS
Q-T INTERVAL, ECG07: 378 MS
QRS DURATION, ECG06: 90 MS
QTC CALCULATION (BEZET), ECG08: 457 MS
VENTRICULAR RATE, ECG03: 88 BPM

## 2018-10-29 NOTE — ED TRIAGE NOTES
Pt states ongoing hiccupping since last Thursday; seen here but states he was given a prescription that pharmacy states they are out of.   States consistent hiccupping interfering with ability to take a deep breath/eat

## 2018-10-29 NOTE — ED PROVIDER NOTES
EMERGENCY DEPARTMENT HISTORY AND PHYSICAL EXAM    10:18 PM      Date: 10/28/2018  Patient Name: Iban Leiva. History of Presenting Illness     Chief Complaint   Patient presents with    Hiccups    Shortness of Breath         History Provided By: Patient    Chief Complaint: Hiccups   Duration: 5 Days  Timing:  Constant  Location: NA  Quality: NA  Severity: Moderate  Modifying Factors: recent surgery   Associated Symptoms: None       Additional History (Context): Dave Correa. is a 58 y.o. male with PMHx of COPD, bone cancer, HTN, CAD, Non Hodgkin's lymphoma who presents with c/o moderate constant hiccups that started 5 days ago. Pt states he has tried drinking fluids and holding his breath, with no relief of Sx. He states he gets the constant hiccups after a major surgery. Pt had stents put in his leg by Dr. Shiv Rubio 6 days ago. Pt was here in the ED before when he had constant hiccups and was seen by Dr. Cyndy Rosado and was given a shot and a prescription that he states was not at the pharmacy when he tried to get it filled. Pt's PCP is Dr. Janie Bronson and has not been seen by her about his Sx because he has a follow-up with Dr. Shiv Rubio soon. Denies any further complaints or symptoms at the moment. PCP: Frida Holm MD    Current Outpatient Medications   Medication Sig Dispense Refill    cyclobenzaprine (FLEXERIL) 5 mg tablet Take 2 Tabs by mouth three (3) times daily (with meals). 9 Tab 0    cyclobenzaprine (FLEXERIL) 5 mg tablet Take 2 Tabs by mouth three (3) times daily (with meals). 9 Tab 0    chlorproMAZINE (THORAZINE) 10 mg tablet Take 2 Tabs by mouth every six (6) hours as needed. 10 Tab 0    clopidogrel (PLAVIX) 75 mg tab Take 1 Tab by mouth daily. 30 Tab 3    oxyCODONE-acetaminophen (PERCOCET) 5-325 mg per tablet Take 1 Tab by mouth every four (4) hours as needed. Max Daily Amount: 6 Tabs. 30 Tab 0    hydroxychloroquine (PLAQUENIL) 200 mg tablet Take 200 mg by mouth daily.       leflunomide (ARAVA) 20 mg tablet Take 20 mg by mouth daily.  predniSONE (DELTASONE) 5 mg tablet Take  by mouth daily. Take 1 to 3 tabs daily      albuterol (PROVENTIL HFA, VENTOLIN HFA, PROAIR HFA) 90 mcg/actuation inhaler Take 2 Puffs by inhalation every four (4) hours as needed for Wheezing or Shortness of Breath. Indications: BRONCHOSPASM PREVENTION, Chronic Obstructive Pulmonary Disease 1 Inhaler 0    atorvastatin (LIPITOR) 20 mg tablet Take  by mouth daily.  cholecalciferol, VITAMIN D3, (VITAMIN D3) 5,000 unit tab tablet Take 5,000 Units by mouth daily.  aspirin (ASPIRIN) 325 mg tablet Take 325 mg by mouth daily.  esomeprazole (NEXIUM) 20 mg capsule Take 20 mg by mouth daily.          Past History     Past Medical History:  Past Medical History:   Diagnosis Date    Arthritis     CAD (coronary artery disease)     [facility]     COPD     Heart failure ([facility]     stents x2 in     History of chemotherapy     History of radiation therapy     Hypertension     Non Hodgkin's lymphoma ([facility] [facility] 2013    had chemo and rad       Past Surgical History:  Past Surgical History:   Procedure Laterality Date    CARDIAC SURG PROCEDURE UNLIST      Stented x2-     HX CORONARY STENT PLACEMENT  13    HX HEART CATHETERIZATION  13    HX HEENT      Deviated septum,     HX OTHER SURGICAL      skin cancer excision face    HX VASCULAR STENT      right leg x 2018 (Dr Maryann Mendez vascular surgeon)    REPAIR ING HERNIA,5+Y/O,REDUCIBL Right 2017    Dr. Dayna Clarke       Family History:  Family History   Problem Relation Age of Onset    Stroke Mother     Heart Disease Father        Social History:  Social History     Tobacco Use    Smoking status: Former Smoker     Packs/day: 2.00     Years: 39.00     Pack years: 78.00     Last attempt to quit: 8/10/2012     Years since quittin.2    Smokeless tobacco: Never Used   Substance Use Topics    Alcohol use: No    Drug use: No       Allergies:  No Known Allergies      Review of Systems       Review of Systems   Constitutional: Negative. Negative for chills, diaphoresis and fever. Positive for hiccups   HENT: Negative. Negative for congestion, rhinorrhea and sore throat. Eyes: Negative. Negative for pain, discharge and redness. Respiratory: Negative. Negative for cough, chest tightness, shortness of breath and wheezing. Cardiovascular: Negative. Negative for chest pain. Gastrointestinal: Negative. Negative for abdominal pain, constipation, diarrhea, nausea and vomiting. Genitourinary: Negative. Negative for dysuria, flank pain, frequency, hematuria and urgency. Musculoskeletal: Negative. Negative for back pain and neck pain. Skin: Negative. Negative for rash. Neurological: Negative. Negative for syncope, weakness, numbness and headaches. Psychiatric/Behavioral: Negative. All other systems reviewed and are negative. Physical Exam     Visit Vitals  /65 (BP 1 Location: Left arm, BP Patient Position: At rest)   Pulse 92   Temp 98.4 °F (36.9 °C)   Resp 26   Ht 6' (1.829 m)   Wt 87.1 kg (192 lb)   SpO2 98%   BMI 26.04 kg/m²         Physical Exam   Constitutional: He appears well-developed and well-nourished. Non-toxic appearance. He does not have a sickly appearance. He does not appear ill. No distress. Hiccups    HENT:   Head: Normocephalic and atraumatic. Mouth/Throat: Oropharynx is clear and moist. No oropharyngeal exudate. Eyes: Conjunctivae and EOM are normal. Pupils are equal, round, and reactive to light. No scleral icterus. Neck: Normal range of motion. Neck supple. No hepatojugular reflux and no JVD present. No tracheal deviation present. No thyromegaly present. Cardiovascular: Normal rate, regular rhythm, S1 normal, S2 normal, normal heart sounds, intact distal pulses and normal pulses. Exam reveals no gallop, no S3 and no S4. No murmur heard.   Pulses:       Radial pulses are 2+ on the right side, and 2+ on the left side. Dorsalis pedis pulses are 2+ on the right side, and 2+ on the left side. Pulmonary/Chest: Effort normal and breath sounds normal. No accessory muscle usage. No respiratory distress. He has no decreased breath sounds. He has no wheezes. He has no rhonchi. He has no rales. Tachypnea secondary to hiccups    Abdominal: Soft. Normal appearance and bowel sounds are normal. He exhibits no distension and no mass. There is no hepatosplenomegaly. There is no tenderness. There is no rigidity, no rebound, no guarding, no CVA tenderness, no tenderness at McBurney's point and negative Holguin's sign. Musculoskeletal: Normal range of motion. Strength 5/5 throughout    Lymphadenopathy:        Head (right side): No submental, no submandibular, no preauricular and no occipital adenopathy present. Head (left side): No submental, no submandibular, no preauricular and no occipital adenopathy present. He has no cervical adenopathy. Right: No supraclavicular adenopathy present. Left: No supraclavicular adenopathy present. Neurological: He is alert. He has normal strength and normal reflexes. He is not disoriented. No cranial nerve deficit or sensory deficit. Coordination and gait normal. GCS eye subscore is 4. GCS verbal subscore is 5. GCS motor subscore is 6. Grossly intact    Skin: Skin is warm, dry and intact. No rash noted. He is not diaphoretic. Psychiatric: He has a normal mood and affect. His speech is normal and behavior is normal. Judgment and thought content normal. Cognition and memory are normal.   Nursing note and vitals reviewed.         Diagnostic Study Results     Labs -  Recent Results (from the past 12 hour(s))   EKG, 12 LEAD, INITIAL    Collection Time: 10/28/18  9:42 PM   Result Value Ref Range    Ventricular Rate 88 BPM    Atrial Rate 88 BPM    P-R Interval 150 ms    QRS Duration 90 ms    Q-T Interval 378 ms    QTC Calculation (Bezet) 457 ms    Calculated P Axis 27 degrees    Calculated R Axis 32 degrees    Calculated T Axis 47 degrees    Diagnosis       Normal sinus rhythm  Nonspecific ST abnormality  Abnormal ECG  When compared with ECG of 26-OCT-2018 11:40,  No significant change was found         Radiologic Studies -   No orders to display         Medical Decision Making   Provider Notes (Medical Decision Making):  MDM  Number of Diagnoses or Management Options  Diagnosis management comments: Paige       I am the first provider for this patient. I reviewed the vital signs, available nursing notes, past medical history, past surgical history, family history and social history. Vital Signs-Reviewed the patient's vital signs. Records Reviewed: Nursing Notes (Time of Review: 10:18 PM)    ED Course: Progress Notes, Reevaluation, and Consults:    EKG showed NSR with rate of 88 bpm. With no ST elevations or depression and non specific T wave changes. 11:15 PM 10/28/2018    Diagnosis       I have reassessed the patient. Patient is feeling the same. Patient will be prescribed Flexirol. Patient was discharged in stable condition. Patient is to return to emergency department if any new or worsening condition. Clinical Impression:   1.  Hiccups        Disposition: Discharged     Follow-up Information     Follow up With Specialties Details Why Contact Info    Trent Martin MD Gastroenterology Schedule an appointment as soon as possible for a visit in 2 days As needed, For follow-up [facility] [address] EMERGENCY DEPT Emergency Medicine Go to As needed, If symptoms worsen [id number] [facility]  [phone]           _______________________________    Attestations:  Isabell 2 acting as a scribe for and in the presence of [address] [place] [address], Ron Jorge, DO      October 28, 2018 at 10:18 PM       Provider Attestation:      I personally performed the services described in the documentation, reviewed the documentation, as recorded by the scribe in my presence, and it accurately and completely records my words and actions.  October 28, 2018 at 10:18 PM - Gabriel Ley,     _______________________________

## 2018-10-29 NOTE — DISCHARGE INSTRUCTIONS
Hiccups: Care Instructions  Your Care Instructions    Hiccups occur when a spasm contracts the diaphragm, a large sheet of muscle that separates the chest cavity from the abdominal cavity. The spasm causes an intake of breath that is suddenly stopped by the closure of the vocal cords (glottis). This closure causes the \"hiccup\" sound. A very full stomach can cause hiccups that go away on their own. A full stomach can be caused by things like eating too much food too quickly or swallowing too much air. Most hiccups go away on their own within a few minutes to a few hours and do not require any treatment. Hiccups that last longer than 48 hours are called persistent hiccups. Hiccups that last longer than a month are called intractable hiccups. Both persistent and intractable hiccups may be a sign of a more serious health problem. Follow-up care is a key part of your treatment and safety. Be sure to make and go to all appointments, and call your doctor if you are having problems. It's also a good idea to know your test results and keep a list of the medicines you take. How can you care for yourself at home? · Try these safe and easy home remedies if your hiccups are making you uncomfortable. ? Eat a teaspoon of sugar or honey. ? Hold your breath and count slowly to 10.  ? Quickly drink a glass of cold water. · If your doctor prescribed medicine, take it as directed. Call your doctor if you think you are having a problem with your medicine. To help prevent hiccups  · Take steps to avoid swallowing air:  ? Eat slowly. Avoid gulping food or beverages. ? Chew your food thoroughly before you swallow. ? Avoid drinking through a straw. ? Avoid chewing gum or eating hard candy. ? Do not smoke or use other tobacco products. ? If you wear dentures, check with a dentist to make sure they fit properly. · Do not eat large meals. · Do not drink alcohol.   · Avoid sudden changes in stomach temperature, such as drinking a hot beverage and then a cold beverage. · Avoid emotional stress or excitement. When should you call for help? Call your doctor now or seek immediate medical care if:    · You have trouble swallowing and are unable to swallow food or fluids.     · You have hiccups for more than 2 days.     · You have new symptoms, such as belly pain, constipation, diarrhea, heartburn, or vomiting.    Watch closely for changes in your health, and be sure to contact your doctor if:    · You have trouble swallowing but are able to swallow food and fluids.     · Hiccups occur often and get in the way of your activities.     · You think medicine may be causing your symptoms.     · You do not get better as expected. Where can you learn more? Go to http://messi-thang.info/. Enter I164 in the search box to learn more about \"Hiccups: Care Instructions. \"  Current as of: November 20, 2017  Content Version: 11.8  © 0332-0810 [employer]. Care instructions adapted under license by [employer] (which disclaims liability or warranty for this information). If you have questions about a medical condition or this instruction, always ask your healthcare professional. [address] any warranty or liability for your use of this information.

## 2018-10-29 NOTE — ED NOTES
Both written and verbal discharge instructions given to pt with verbalized understanding of home care and follow up. Prescription given. Pt has a ride home.

## 2018-11-05 ENCOUNTER — OFFICE VISIT (OUTPATIENT)
Dept: VASCULAR SURGERY | Age: 63
End: 2018-11-05

## 2018-11-05 VITALS
WEIGHT: 192 LBS | DIASTOLIC BLOOD PRESSURE: 66 MMHG | BODY MASS INDEX: 26.01 KG/M2 | HEIGHT: 72 IN | RESPIRATION RATE: 16 BRPM | HEART RATE: 68 BPM | SYSTOLIC BLOOD PRESSURE: 120 MMHG

## 2018-11-05 DIAGNOSIS — I73.9 PAD (PERIPHERAL ARTERY DISEASE) (HCC): ICD-10-CM

## 2018-11-05 DIAGNOSIS — I70.213 ATHEROSCLEROSIS OF NATIVE ARTERY OF BOTH LOWER EXTREMITIES WITH INTERMITTENT CLAUDICATION (HCC): Primary | ICD-10-CM

## 2018-11-05 NOTE — PROGRESS NOTES
Dave PURCELL Servando Tolbert. Chief Complaint   Patient presents with    Surgical Follow-up        History and Physical    Dave Torres. is a 58 y.o. male with bilateral lower extremity claudication who presents today status post right common femoral endarterectomy with remote endarterectomy of the right superficial femoral artery and above knee popliteal artery and transluminal intravascular placement of stent within the above knee popliteal artery and superficial femoral artery, 7 x 150 Viabahn stents x 2. He is also status post left lower extremity angiogram with SFA and popliteal atherectomy and balloon angioplasty in August of this year. He is doing quite well postoperatively. Unfortunately he did have severe hiccups postoperatively resulting in 2 emergency room visits. He states that today is the first day he has not had hiccups. He denies any fevers or chills. He does have some incisional pain and soreness in the right leg but feels this is slowly improving. He has no ischemic rest pain.         Past Medical History:   Diagnosis Date    Arthritis     CAD (coronary artery disease)     Cancer of bone ([address]     COPD     Heart failure ([address]     stents x2 in 2008    History of chemotherapy     History of radiation therapy     Hypertension     Non Hodgkin's lymphoma ([address] 2013    had chemo and rad     Past Surgical History:   Procedure Laterality Date    CARDIAC SURG PROCEDURE UNLIST      Stented x2- 2008    HX CORONARY STENT PLACEMENT  6-5-13    HX HEART CATHETERIZATION  6-5-13    HX HEENT      Deviated septum,     HX OTHER SURGICAL      skin cancer excision face    HX VASCULAR STENT      right leg x 2 October 2018 (Dr Melinda Ling vascular surgeon)    REPAIR ING HERNIA,5+Y/O,REDUCIBL Right 12/05/2017    Dr. Coni Motley     Patient Active Problem List   Diagnosis Code    Sepsis(995.91) A41.9    Immunocompromised state ([address] D84.9    Anemia, unspecified D64.9    Bone cancer ([address] C41.9    PAD (peripheral artery disease) ([facility]) I73.9    Aortic ectasia, abdominal ([facility]) I77.811    Iliac artery aneurysm, left ([facility]) I72.3    Right inguinal hernia K40.90    Atherosclerosis of native artery of both lower extremities with intermittent claudication ([facility]) I70.213     Current Outpatient Medications   Medication Sig Dispense Refill    cyclobenzaprine (FLEXERIL) 5 mg tablet Take 2 Tabs by mouth three (3) times daily (with meals). 9 Tab 0    cyclobenzaprine (FLEXERIL) 5 mg tablet Take 2 Tabs by mouth three (3) times daily (with meals). 9 Tab 0    chlorproMAZINE (THORAZINE) 10 mg tablet Take 2 Tabs by mouth every six (6) hours as needed. 10 Tab 0    clopidogrel (PLAVIX) 75 mg tab Take 1 Tab by mouth daily. 30 Tab 3    oxyCODONE-acetaminophen (PERCOCET) 5-325 mg per tablet Take 1 Tab by mouth every four (4) hours as needed. Max Daily Amount: 6 Tabs. 30 Tab 0    hydroxychloroquine (PLAQUENIL) 200 mg tablet Take 200 mg by mouth daily.  leflunomide (ARAVA) 20 mg tablet Take 20 mg by mouth daily.  predniSONE (DELTASONE) 5 mg tablet Take  by mouth daily. Take 1 to 3 tabs daily      albuterol (PROVENTIL HFA, VENTOLIN HFA, PROAIR HFA) 90 mcg/actuation inhaler Take 2 Puffs by inhalation every four (4) hours as needed for Wheezing or Shortness of Breath. Indications: BRONCHOSPASM PREVENTION, Chronic Obstructive Pulmonary Disease 1 Inhaler 0    atorvastatin (LIPITOR) 20 mg tablet Take  by mouth daily.  cholecalciferol, VITAMIN D3, (VITAMIN D3) 5,000 unit tab tablet Take 5,000 Units by mouth daily.  aspirin (ASPIRIN) 325 mg tablet Take 325 mg by mouth daily.  esomeprazole (NEXIUM) 20 mg capsule Take 20 mg by mouth daily.        No Known Allergies    Physical Exam:    Visit Vitals  /66 (BP 1 Location: Left arm, BP Patient Position: Sitting)   Pulse 68   Resp 16   Ht 6' (1.829 m)   Wt 192 lb (87.1 kg)   BMI 26.04 kg/m²      General: Well-appearing male in no acute distress   HEENT: EOMI, no scleral icterus is noted. Pulmonary: No increased work or breathing is noted. Abdomen: nondistended. Extremities: Warm and well perfused bilaterally. Pt has no BLE edema. Right groin soft, incision c/d/i. Neuro: Cranial nerves II through XII are grossly intact   Integument: No ulcerations are identified visibly      Impression and Plan:  Jain Yohannes. is a 58 y.o. male with bilateral lower extremity claudication now status post left leg angiogram in August of this year and presents today status post right common femoral endarterectomy with SFA and popliteal artery stenting. He is doing very well postoperatively. Discussed that we will obtain follow-up imaging in the next 3-4 weeks and he will follow-up afterwards. He will certainly call the office sooner as needed. He will continue his antiplatelet therapy along with his statin. Plan was discussed. Patient expresses understanding and agrees. Follow-up Disposition:  Return in about 1 month (around 12/5/2018). Marie Christianson [facility]  [phone]        PLEASE NOTE:  This document has been produced using voice recognition software. Unrecognized errors in transcription may be present.

## 2018-11-05 NOTE — PROGRESS NOTES
1. Have you been to an emergency room or urgent care clinic since your last visit? Yes, hiccups  Hospitalized since your last visit? If yes, where, when, and reason for visit? No   2. Have you seen or consulted any other health care providers outside of the [facility] since your last visit including any procedures, health maintenance items. If yes, where, when and reason for visit?

## 2018-12-12 ENCOUNTER — HOSPITAL ENCOUNTER (OUTPATIENT)
Age: 63
Discharge: HOME | End: 2018-12-12
Attending: INTERNAL MEDICINE
Payer: MEDICARE

## 2018-12-12 ENCOUNTER — OFFICE VISIT (OUTPATIENT)
Dept: VASCULAR SURGERY | Age: 63
End: 2018-12-12

## 2018-12-12 VITALS
WEIGHT: 192 LBS | DIASTOLIC BLOOD PRESSURE: 72 MMHG | HEIGHT: 72 IN | BODY MASS INDEX: 26.01 KG/M2 | SYSTOLIC BLOOD PRESSURE: 110 MMHG | HEART RATE: 92 BPM | RESPIRATION RATE: 17 BRPM

## 2018-12-12 DIAGNOSIS — I73.9 PAD (PERIPHERAL ARTERY DISEASE) (HCC): ICD-10-CM

## 2018-12-12 DIAGNOSIS — M54.50 LOW BACK PAIN: ICD-10-CM

## 2018-12-12 DIAGNOSIS — M54.50 LOW BACK PAIN WITHOUT SCIATICA, UNSPECIFIED BACK PAIN LATERALITY, UNSPECIFIED CHRONICITY: ICD-10-CM

## 2018-12-12 DIAGNOSIS — C85.90 NON-HODGKIN'S LYMPHOMA, UNSPECIFIED BODY REGION, UNSPECIFIED NON-HODGKIN LYMPHOMA TYPE (HCC): ICD-10-CM

## 2018-12-12 DIAGNOSIS — I70.213 ATHEROSCLEROSIS OF NATIVE ARTERY OF BOTH LOWER EXTREMITIES WITH INTERMITTENT CLAUDICATION (HCC): Primary | ICD-10-CM

## 2018-12-12 PROCEDURE — 72148 MRI LUMBAR SPINE W/O DYE: CPT

## 2018-12-12 NOTE — PROGRESS NOTES
1. Have you been to an emergency room or urgent care clinic since your last visit? NO    Hospitalized since your last visit? If yes, where, when, and reason for visit? No  2. Have you seen or consulted any other health care providers outside of the [facility] since your last visit including any procedures, health maintenance items. If yes, where, when and reason for visit?  NO

## 2018-12-12 NOTE — PROGRESS NOTES
Dave Al Jr. Chief Complaint   Patient presents with    Leg Pain        History and Physical    Dave Zazueta is a 58 y.o. male with bilateral lower extremity claudication who presents today in follow up. He is status post right common femoral endarterectomy with remote endarterectomy of the right superficial femoral artery and above knee popliteal artery and transluminal intravascular placement of stent within the above knee popliteal artery and superficial femoral artery, 7 x 150 Viabahn stents x 2 in October of this year. He is also status post left lower extremity angiogram with SFA and popliteal atherectomy and balloon angioplasty in August of this year. He is doing very well with no further claudication symptoms. Unfortunately he is having worsening low back pain and is scheduled for an MRI this morning. He does have a history of bone cancer as well as non-Hodgkin's lymphoma. Otherwise he is without complaints in the office today. No fevers or chills. No rest pain. No skin changes or tissue loss reported. He did have follow-up ultrasounds which showed patent right SFA and popliteal stents. Diffuse mild heterogeneous plaque noted throughout right lower extremity with less than 50% stenosis. Multiphasic waveforms noted throughout left lower extremity. Mild heterogeneous plaque seen left lower extremity with less than 50% stenosis.   No evidence of critical arterial insufficiency noted bilateral lower extremity      Past Medical History:   Diagnosis Date    Arthritis     CAD (coronary artery disease)     Cancer of bone ([address]     COPD     Heart failure ([address]     stents x2 in 2008    History of chemotherapy     History of radiation therapy     Hypertension     Non Hodgkin's lymphoma ([address] 2013    had chemo and rad     Past Surgical History:   Procedure Laterality Date    CARDIAC SURG PROCEDURE UNLIST      Stented x2- 2008    COLONOSCOPY N/A 8/15/2016    COLONOSCOPY with polypectomy, biopsy and clip performed by Yumiko Pisano MD at [address]   6-5-13    HX HEART CATHETERIZATION  6-5-13    HX HEENT      Deviated septum,     HX OTHER SURGICAL      skin cancer excision face    HX VASCULAR STENT      right leg x 2 October 2018 (Dr Jossy Evans vascular surgeon)    REPAIR ING HERNIA,5+Y/O,REDUCIBL Right 12/05/2017    Dr. Franklyn Leonardo     Patient Active Problem List   Diagnosis Code    Sepsis(995.91) A41.9    Immunocompromised state ([address] D84.9    Anemia, unspecified D64.9    Bone cancer ([address] C41.9    PAD (peripheral artery disease) ([address] I73.9    Aortic ectasia, abdominal ([address] I77.811    Iliac artery aneurysm, left ([address] I72.3    Right inguinal hernia K40.90    Atherosclerosis of native artery of both lower extremities with intermittent claudication (HCC) I70.213     Current Outpatient Medications   Medication Sig Dispense Refill    cyclobenzaprine (FLEXERIL) 5 mg tablet Take 2 Tabs by mouth three (3) times daily (with meals). 9 Tab 0    cyclobenzaprine (FLEXERIL) 5 mg tablet Take 2 Tabs by mouth three (3) times daily (with meals). 9 Tab 0    chlorproMAZINE (THORAZINE) 10 mg tablet Take 2 Tabs by mouth every six (6) hours as needed. 10 Tab 0    clopidogrel (PLAVIX) 75 mg tab Take 1 Tab by mouth daily. 30 Tab 3    oxyCODONE-acetaminophen (PERCOCET) 5-325 mg per tablet Take 1 Tab by mouth every four (4) hours as needed. Max Daily Amount: 6 Tabs. 30 Tab 0    hydroxychloroquine (PLAQUENIL) 200 mg tablet Take 200 mg by mouth daily.  leflunomide (ARAVA) 20 mg tablet Take 20 mg by mouth daily.  predniSONE (DELTASONE) 5 mg tablet Take  by mouth daily. Take 1 to 3 tabs daily      albuterol (PROVENTIL HFA, VENTOLIN HFA, PROAIR HFA) 90 mcg/actuation inhaler Take 2 Puffs by inhalation every four (4) hours as needed for Wheezing or Shortness of Breath.  Indications: BRONCHOSPASM PREVENTION, Chronic Obstructive Pulmonary Disease 1 Inhaler 0    atorvastatin (LIPITOR) 20 mg tablet Take  by mouth daily.  cholecalciferol, VITAMIN D3, (VITAMIN D3) 5,000 unit tab tablet Take 5,000 Units by mouth daily.  aspirin (ASPIRIN) 325 mg tablet Take 325 mg by mouth daily.  esomeprazole (NEXIUM) 20 mg capsule Take 20 mg by mouth daily. No Known Allergies    Physical Exam:    Visit Vitals  /72 (BP 1 Location: Left arm, BP Patient Position: Sitting)   Pulse 92   Resp 17   Ht 6' (1.829 m)   Wt 192 lb (87.1 kg)   BMI 26.04 kg/m²      General: Well-appearing male in no acute distress   HEENT: EOMI, no scleral icterus is noted. Pulmonary: No increased work or breathing is noted. Abdomen: nondistended. Extremities: Warm and well perfused bilaterally. Pt has no BLE edema. Neuro: Cranial nerves II through XII are grossly intact   Integument: No ulcerations are identified visibly      Impression and Plan:  Dave Peacock. is a 58 y.o. male with bilateral lower extremity claudication. He is now status post left leg angiogram in August of this year as well as right common femoral endarterectomy with SFA and popliteal artery stenting. He is doing very well from a vascular standpoint. He denies any claudication symptoms at this time and no rest pain. Unfortunately he is having worsening low back pain and is scheduled for an MRI today. Discussed that we will obtain follow-up ultrasounds in 6 months and he will follow-up afterwards. He is certainly understanding to call our office sooner as needed. He will continue his antiplatelet therapy as well as his statin. Plan was discussed. Patient expresses understanding and agrees. Follow-up Disposition:  Return in about 6 months (around 6/12/2019). Hilda Hernandez  [phone]        PLEASE NOTE:  This document has been produced using voice recognition software. Unrecognized errors in transcription may be present.

## 2019-01-01 ENCOUNTER — HOSPITAL ENCOUNTER (EMERGENCY)
Age: 64
Discharge: HOME | End: 2019-01-01
Attending: EMERGENCY MEDICINE
Payer: MEDICARE

## 2019-01-01 VITALS
BODY MASS INDEX: 26.01 KG/M2 | WEIGHT: 192 LBS | SYSTOLIC BLOOD PRESSURE: 141 MMHG | OXYGEN SATURATION: 97 % | TEMPERATURE: 97.3 F | DIASTOLIC BLOOD PRESSURE: 85 MMHG | HEART RATE: 89 BPM | RESPIRATION RATE: 18 BRPM | HEIGHT: 72 IN

## 2019-01-01 DIAGNOSIS — K04.7 DENTAL ABSCESS: Primary | ICD-10-CM

## 2019-01-01 PROCEDURE — 99282 EMERGENCY DEPT VISIT SF MDM: CPT

## 2019-01-01 RX ORDER — ACETAMINOPHEN AND CODEINE PHOSPHATE 300; 30 MG/1; MG/1
1 TABLET ORAL
Qty: 12 TAB | Refills: 0 | Status: SHIPPED | OUTPATIENT
Start: 2019-01-01

## 2019-01-01 RX ORDER — CLINDAMYCIN HYDROCHLORIDE 300 MG/1
300 CAPSULE ORAL 4 TIMES DAILY
Qty: 40 CAP | Refills: 0 | Status: SHIPPED | OUTPATIENT
Start: 2019-01-01 | End: 2019-01-11

## 2019-01-01 NOTE — DISCHARGE INSTRUCTIONS
Abscessed Tooth: Care Instructions  Your Care Instructions    An abscessed tooth is a tooth that has a pocket of pus in the tissues around it. Pus forms when the body tries to fight an infection caused by bacteria. If the pus cannot drain, it forms an abscess. An abscessed tooth can cause red, swollen gums and throbbing pain, especially when you chew. You may have a bad taste in your mouth and a fever, and your jaw may swell. Damage to the tooth, untreated tooth decay, or gum disease can cause an abscessed tooth. An abscessed tooth needs to be treated by a dental professional right away. If it is not treated, the infection could spread to other parts of your body. Your dentist will give you antibiotics to stop the infection. He or she may make a hole in the tooth or cut open (mandy) the abscess inside your mouth so that the infection can drain, which should relieve your pain. You may need to have a root canal treatment, which tries to save your tooth by taking out the infected pulp and replacing it with a healing medicine and/or a filling. If these treatments do not work, your tooth may have to be removed. Follow-up care is a key part of your treatment and safety. Be sure to make and go to all appointments, and call your doctor if you are having problems. It's also a good idea to know your test results and keep a list of the medicines you take. How can you care for yourself at home? · Reduce pain and swelling in your face and jaw by putting ice or a cold pack on the outside of your cheek for 10 to 20 minutes at a time. Put a thin cloth between the ice and your skin. · Take pain medicines exactly as directed. ? If the doctor gave you a prescription medicine for pain, take it as prescribed. ? If you are not taking a prescription pain medicine, ask your doctor if you can take an over-the-counter medicine. · Take your antibiotics as directed. Do not stop taking them just because you feel better.  You need to take the full course of antibiotics. To prevent tooth abscess  · Brush and floss every day, and have regular dental checkups. · Eat a healthy diet, and avoid sugary foods and drinks. · Do not smoke, use e-cigarettes with nicotine, or use spit tobacco. Tobacco and nicotine slow your ability to heal. Tobacco also increases your risk for gum disease and cancer of the mouth and throat. If you need help quitting, talk to your doctor about stop-smoking programs and medicines. These can increase your chances of quitting for good. When should you call for help? Call 911 anytime you think you may need emergency care. For example, call if:    · You have trouble breathing.    Call your doctor now or seek immediate medical care if:    · You have new or worse symptoms of infection, such as:  ? Increased pain, swelling, warmth, or redness. ? Red streaks leading from the area. ? Pus draining from the area. ? A fever.    Watch closely for changes in your health, and be sure to contact your doctor if:    · You do not get better as expected. Where can you learn more? Go to http://messi-thang.info/. Enter D476 in the search box to learn more about \"Abscessed Tooth: Care Instructions. \"  Current as of: March 28, 2018  Content Version: 11.8  © 4420-0975 [employer]. Care instructions adapted under license by [employer] (which disclaims liability or warranty for this information). If you have questions about a medical condition or this instruction, always ask your healthcare professional. [place] [address] any warranty or liability for your use of this information.

## 2019-01-01 NOTE — ED PROVIDER NOTES
[address] EMERGENCY DEPT      2:06 PM    Date: 1/1/2019  Patient Name: Cally Hatch. History of Presenting Illness     Chief Complaint   Patient presents with    Dental Pain     58 y.o. male with noted past medical history who presents to the emergency department c/o pain and swelling to his right lower jaw for the past 2 days. He has not taken anything for pain. Describes it as a constant severe throbbing pain. Notes that the region has drained pus and blood into his mouth. Worse pain with eating, improved with resting. Pt does not have a dentist.  Denies fever, chills, difficulty breathing/swallowing, or other symptoms at this time. No other complaints. Nursing notes regarding the HPI and triage nursing notes were reviewed. Prior medical records were reviewed. Current Outpatient Medications   Medication Sig Dispense Refill    clindamycin (CLEOCIN) 300 mg capsule Take 1 Cap by mouth four (4) times daily for 10 days. 40 Cap 0    acetaminophen-codeine (TYLENOL-CODEINE #3) 300-30 mg per tablet Take 1 Tab by mouth every four (4) hours as needed for Pain. Max Daily Amount: 6 Tabs. 12 Tab 0    cyclobenzaprine (FLEXERIL) 5 mg tablet Take 2 Tabs by mouth three (3) times daily (with meals). 9 Tab 0    cyclobenzaprine (FLEXERIL) 5 mg tablet Take 2 Tabs by mouth three (3) times daily (with meals). 9 Tab 0    chlorproMAZINE (THORAZINE) 10 mg tablet Take 2 Tabs by mouth every six (6) hours as needed. 10 Tab 0    clopidogrel (PLAVIX) 75 mg tab Take 1 Tab by mouth daily. 30 Tab 3    hydroxychloroquine (PLAQUENIL) 200 mg tablet Take 200 mg by mouth daily.  leflunomide (ARAVA) 20 mg tablet Take 20 mg by mouth daily.  predniSONE (DELTASONE) 5 mg tablet Take  by mouth daily. Take 1 to 3 tabs daily      albuterol (PROVENTIL HFA, VENTOLIN HFA, PROAIR HFA) 90 mcg/actuation inhaler Take 2 Puffs by inhalation every four (4) hours as needed for Wheezing or Shortness of Breath. Indications: BRONCHOSPASM PREVENTION, Chronic Obstructive Pulmonary Disease 1 Inhaler 0    atorvastatin (LIPITOR) 20 mg tablet Take  by mouth daily.  cholecalciferol, VITAMIN D3, (VITAMIN D3) 5,000 unit tab tablet Take 5,000 Units by mouth daily.  aspirin (ASPIRIN) 325 mg tablet Take 325 mg by mouth daily.  esomeprazole (NEXIUM) 20 mg capsule Take 20 mg by mouth daily. Past History     Past Medical History:  Past Medical History:   Diagnosis Date    Arthritis     CAD (coronary artery disease)     Cancer of bone ([address]     COPD     Heart failure ([address]     stents x2 in     History of chemotherapy     History of radiation therapy     Hypertension     Non Hodgkin's lymphoma ([address] 2013    had chemo and rad       Past Surgical History:  Past Surgical History:   Procedure Laterality Date    CARDIAC SURG PROCEDURE UNLIST      Stented x2-     COLONOSCOPY N/A 8/15/2016    COLONOSCOPY with polypectomy, biopsy and clip performed by Idania Carr MD at [address]   13    HX HEART CATHETERIZATION  13    HX HEENT      Deviated septum,     HX OTHER SURGICAL      skin cancer excision face    HX VASCULAR STENT      right leg x 2018 (Dr Storm Screws vascular surgeon)   [facility] REPAIR ING HERNIA,5+Y/O,REDUCIBL Right 2017    Dr. Halie Vidal       Family History:  Family History   Problem Relation Age of Onset    Stroke Mother     Heart Disease Father        Social History:  Social History     Tobacco Use    Smoking status: Former Smoker     Packs/day: 2.00     Years: 39.00     Pack years: 78.00     Last attempt to quit: 8/10/2012     Years since quittin.3    Smokeless tobacco: Never Used   Substance Use Topics    Alcohol use: No    Drug use: No       Allergies:  No Known Allergies    Patient's primary care provider (as noted in EPIC):  Aye Rossi MD    Review of Systems   Constitutional:  Denies malaise, fever, chills. ENMT:  + right lower toothache/swelling. Neck:  Denies injury or pain. Neuro:  Denies headache, LOC, dizziness, neurologic symptoms/deficits/paresthesias. Skin: Denies injury, rash, itching or skin changes. All other systems negative as reviewed. Visit Vitals  /85 (BP 1 Location: Left arm, BP Patient Position: At rest)   Pulse 89   Temp 97.3 °F (36.3 °C)   Resp 18   Ht 6' (1.829 m)   Wt 87.1 kg (192 lb)   SpO2 97%   BMI 26.04 kg/m²       PHYSICAL EXAM:    CONSTITUTIONAL:  Alert, in no apparent distress;  well developed;  well nourished. HEAD:  Normocephalic, atraumatic. EYES:  EOMI. Non-icteric sclera. Normal conjunctiva. ENTM:  Nose:  no rhinorrhea. Throat:  no erythema or exudate, mucous membranes moist, uvula midline, airway patent, poor dentition. Face: right lower mandibular region with mild edema present. NECK:   Supple  RESPIRATORY:  Chest clear, equal breath sounds, good air movement. CARDIOVASCULAR:  Regular rate and rhythm. No murmurs, rubs, or gallops. NEURO:  Moves all four extremities, and grossly normal motor exam.  SKIN:  No rashes;  Normal for age. PSYCH:  Alert and normal affect. Oropharynx/ teeth:  SUPERIOR/ADJACENT to tooth # 28 (which is decayed and broken) there is noted erythema and fluctuance and tenderness to palpation, consistent with abscess that is actively draining. Oropharynx is otherwise normal and symmetric. IMPRESSION AND MEDICAL DECISION MAKING:  Based upon the patient's presentation with noted HPI and PE, along with the work up done in the emergency department, I believe that the patient is having noted abscess. I was able to express about 5cc's of bloody purulence. Rx provided for clindamycin and tylenol 3. Pt to call dentist tomorrow to make an appointment to be seen. Diagnosis:   1.  Dental abscess      Disposition: Discharge    Follow-up Information     Follow up With Specialties Details Why [address]  In 1 day 64 [facility] EMERGENCY DEPT Emergency Medicine  If symptoms worsen [address] [id number] [phone]             Medication List      START taking these medications    acetaminophen-codeine 300-30 mg per tablet  Commonly known as:  TYLENOL-CODEINE #3  Take 1 Tab by mouth every four (4) hours as needed for Pain. Max Daily Amount: 6 Tabs. clindamycin 300 mg capsule  Commonly known as:  CLEOCIN  Take 1 Cap by mouth four (4) times daily for 10 days. STOP taking these medications    oxyCODONE-acetaminophen 5-325 mg per tablet  Commonly known as:  PERCOCET        ASK your doctor about these medications    albuterol 90 mcg/actuation inhaler  Commonly known as:  PROVENTIL HFA, VENTOLIN HFA, PROAIR HFA  Take 2 Puffs by inhalation every four (4) hours as needed for Wheezing or Shortness of Breath. Indications: BRONCHOSPASM PREVENTION, Chronic Obstructive Pulmonary Disease     aspirin 325 mg tablet  Commonly known as:  ASPIRIN     chlorproMAZINE 10 mg tablet  Commonly known as:  THORAZINE  Take 2 Tabs by mouth every six (6) hours as needed. cholecalciferol (VITAMIN D3) 5,000 unit Tab tablet  Commonly known as:  VITAMIN D3     clopidogrel 75 mg Tab  Commonly known as:  PLAVIX  Take 1 Tab by mouth daily. * cyclobenzaprine 5 mg tablet  Commonly known as:  FLEXERIL  Take 2 Tabs by mouth three (3) times daily (with meals). * cyclobenzaprine 5 mg tablet  Commonly known as:  FLEXERIL  Take 2 Tabs by mouth three (3) times daily (with meals). leflunomide 20 mg tablet  Commonly known as:  ARAVA     LIPITOR 20 mg tablet  Generic drug:  atorvastatin     NexIUM 20 mg capsule  Generic drug:  esomeprazole     PLAQUENIL 200 mg tablet  Generic drug:  hydroxychloroquine     predniSONE 5 mg tablet  Commonly known as:  [place]         * This list has 2 medication(s) that are the same as other medications prescribed for you.  Read the directions carefully, and ask your doctor or other care provider to review them with you.                Where to Get Your Medications      Information about where to get these medications is not yet available    Ask your nurse or doctor about these medications  · acetaminophen-codeine 300-30 mg per tablet  · clindamycin 300 mg capsule       ROSENDA Haile

## 2019-01-08 ENCOUNTER — HOSPITAL ENCOUNTER (OUTPATIENT)
Dept: GENERAL RADIOLOGY | Age: 64
Discharge: HOME | End: 2019-01-08
Attending: PHYSICIAN ASSISTANT
Payer: MEDICARE

## 2019-01-08 DIAGNOSIS — R13.10 DYSPHAGIA: ICD-10-CM

## 2019-01-08 DIAGNOSIS — R06.6 HICCOUGH: ICD-10-CM

## 2019-01-08 DIAGNOSIS — R22.1 MASS OF NECK: ICD-10-CM

## 2019-01-08 PROCEDURE — 74011000255 HC RX REV CODE- 255: Performed by: PHYSICIAN ASSISTANT

## 2019-01-08 PROCEDURE — 74011000250 HC RX REV CODE- 250: Performed by: PHYSICIAN ASSISTANT

## 2019-01-08 PROCEDURE — 74220 X-RAY XM ESOPHAGUS 1CNTRST: CPT

## 2019-01-08 RX ADMIN — ANTACID/ANTIFLATULENT 4 G: 380; 550; 10; 10 GRANULE, EFFERVESCENT ORAL at 08:00

## 2019-01-08 RX ADMIN — BARIUM SULFATE 176 G: 960 POWDER, FOR SUSPENSION ORAL at 08:00

## 2019-01-08 RX ADMIN — BARIUM SULFATE 700 MG: 700 TABLET ORAL at 08:00

## 2019-01-08 RX ADMIN — BARIUM SULFATE 135 ML: 980 POWDER, FOR SUSPENSION ORAL at 08:00

## 2019-01-22 ENCOUNTER — TELEPHONE (OUTPATIENT)
Dept: VASCULAR SURGERY | Age: 64
End: 2019-01-22

## 2019-01-22 NOTE — TELEPHONE ENCOUNTER
Obdulia from Dr [employer] office called, wants to know how long the pt has to be on blood thinner before can come off for a procedure? Please call her.

## 2019-01-22 NOTE — TELEPHONE ENCOUNTER
Discussed with provider and ok for patient to be off of plavix for 5 days but needs to restart as soon as he can, left detailed message with Obdulia .

## 2019-02-21 ENCOUNTER — HOSPITAL ENCOUNTER (OUTPATIENT)
Dept: PET IMAGING | Age: 64
Discharge: HOME | End: 2019-02-21
Attending: INTERNAL MEDICINE
Payer: MEDICARE

## 2019-02-21 DIAGNOSIS — C83.38: ICD-10-CM

## 2019-02-21 PROCEDURE — A9552 F18 FDG: HCPCS

## 2019-05-16 ENCOUNTER — HOSPITAL ENCOUNTER (OUTPATIENT)
Dept: PET IMAGING | Age: 64
Discharge: HOME | End: 2019-05-16
Attending: INTERNAL MEDICINE
Payer: MEDICARE

## 2019-05-16 DIAGNOSIS — C83.38 RETICULOSARCOMA OF LYMPH NODES OF MULTIPLE SITES (HCC): ICD-10-CM

## 2019-05-16 PROCEDURE — A9552 F18 FDG: HCPCS

## 2019-05-24 ENCOUNTER — HOSPITAL ENCOUNTER (OUTPATIENT)
Dept: LAB | Age: 64
Discharge: HOME | End: 2019-05-24
Payer: MEDICARE

## 2019-05-24 ENCOUNTER — HOSPITAL ENCOUNTER (OUTPATIENT)
Dept: LAB | Age: 64
Discharge: HOME | End: 2019-05-24

## 2019-05-24 PROCEDURE — 88305 TISSUE EXAM BY PATHOLOGIST: CPT

## 2019-05-24 PROCEDURE — 88173 CYTOPATH EVAL FNA REPORT: CPT

## 2019-05-29 ENCOUNTER — HOSPITAL ENCOUNTER (OUTPATIENT)
Dept: LAB | Age: 64
Discharge: HOME | End: 2019-05-29
Payer: MEDICARE

## 2019-05-29 DIAGNOSIS — Z01.818 PRE-OP EVALUATION: ICD-10-CM

## 2019-05-29 LAB
ANION GAP SERPL CALC-SCNC: 6 MMOL/L (ref 3–18)
ATRIAL RATE: 70 BPM
BASOPHILS # BLD: 0.1 K/UL (ref 0–0.1)
BASOPHILS NFR BLD: 1 % (ref 0–2)
BUN SERPL-MCNC: 9 MG/DL (ref 7–18)
BUN/CREAT SERPL: 11 (ref 12–20)
CALCIUM SERPL-MCNC: 9.4 MG/DL (ref 8.5–10.1)
CALCULATED P AXIS, ECG09: 49 DEGREES
CALCULATED R AXIS, ECG10: 37 DEGREES
CALCULATED T AXIS, ECG11: 60 DEGREES
CHLORIDE SERPL-SCNC: 106 MMOL/L (ref 100–108)
CO2 SERPL-SCNC: 26 MMOL/L (ref 21–32)
CREAT SERPL-MCNC: 0.79 MG/DL (ref 0.6–1.3)
DIAGNOSIS, 93000: NORMAL
DIFFERENTIAL METHOD BLD: ABNORMAL
EOSINOPHIL # BLD: 0.2 K/UL (ref 0–0.4)
EOSINOPHIL NFR BLD: 4 % (ref 0–5)
ERYTHROCYTE [DISTWIDTH] IN BLOOD BY AUTOMATED COUNT: 15.5 % (ref 11.6–14.5)
GLUCOSE SERPL-MCNC: 86 MG/DL (ref 74–99)
HCT VFR BLD AUTO: 38.8 % (ref 36–48)
HGB BLD-MCNC: 12.2 G/DL (ref 13–16)
LYMPHOCYTES # BLD: 1.4 K/UL (ref 0.9–3.6)
LYMPHOCYTES NFR BLD: 22 % (ref 21–52)
MCH RBC QN AUTO: 24.5 PG (ref 24–34)
MCHC RBC AUTO-ENTMCNC: 31.4 G/DL (ref 31–37)
MCV RBC AUTO: 77.9 FL (ref 74–97)
MONOCYTES # BLD: 0.4 K/UL (ref 0.05–1.2)
MONOCYTES NFR BLD: 7 % (ref 3–10)
NEUTS SEG # BLD: 4 K/UL (ref 1.8–8)
NEUTS SEG NFR BLD: 66 % (ref 40–73)
P-R INTERVAL, ECG05: 156 MS
PLATELET # BLD AUTO: 257 K/UL (ref 135–420)
PMV BLD AUTO: 9.7 FL (ref 9.2–11.8)
POTASSIUM SERPL-SCNC: 4.5 MMOL/L (ref 3.5–5.5)
Q-T INTERVAL, ECG07: 394 MS
QRS DURATION, ECG06: 86 MS
QTC CALCULATION (BEZET), ECG08: 425 MS
RBC # BLD AUTO: 4.98 M/UL (ref 4.7–5.5)
SODIUM SERPL-SCNC: 138 MMOL/L (ref 136–145)
VENTRICULAR RATE, ECG03: 70 BPM
WBC # BLD AUTO: 6.1 K/UL (ref 4.6–13.2)

## 2019-05-29 PROCEDURE — 36415 COLL VENOUS BLD VENIPUNCTURE: CPT

## 2019-05-29 PROCEDURE — 80048 BASIC METABOLIC PNL TOTAL CA: CPT

## 2019-05-29 PROCEDURE — 85025 COMPLETE CBC W/AUTO DIFF WBC: CPT

## 2019-05-29 PROCEDURE — 93005 ELECTROCARDIOGRAM TRACING: CPT

## 2019-05-31 ENCOUNTER — ANESTHESIA EVENT (OUTPATIENT)
Dept: SURGERY | Age: 64
End: 2019-05-31
Payer: MEDICARE

## 2019-06-03 ENCOUNTER — HOSPITAL ENCOUNTER (OUTPATIENT)
Age: 64
Setting detail: OUTPATIENT SURGERY
Discharge: HOME | End: 2019-06-03
Attending: OTOLARYNGOLOGY | Admitting: OTOLARYNGOLOGY
Payer: MEDICARE

## 2019-06-03 ENCOUNTER — ANESTHESIA (OUTPATIENT)
Dept: SURGERY | Age: 64
End: 2019-06-03
Payer: MEDICARE

## 2019-06-03 VITALS
OXYGEN SATURATION: 94 % | DIASTOLIC BLOOD PRESSURE: 78 MMHG | HEART RATE: 81 BPM | BODY MASS INDEX: 23.99 KG/M2 | SYSTOLIC BLOOD PRESSURE: 118 MMHG | RESPIRATION RATE: 14 BRPM | WEIGHT: 177.13 LBS | TEMPERATURE: 97.2 F | HEIGHT: 72 IN

## 2019-06-03 DIAGNOSIS — R22.1 NECK MASS: Primary | ICD-10-CM

## 2019-06-03 PROCEDURE — 77030040356 HC CORD BPLR FRCP COVD -A: Performed by: OTOLARYNGOLOGY

## 2019-06-03 PROCEDURE — 88305 TISSUE EXAM BY PATHOLOGIST: CPT

## 2019-06-03 PROCEDURE — 76010000149 HC OR TIME 1 TO 1.5 HR: Performed by: OTOLARYNGOLOGY

## 2019-06-03 PROCEDURE — 88342 IMHCHEM/IMCYTCHM 1ST ANTB: CPT

## 2019-06-03 PROCEDURE — 76210000021 HC REC RM PH II 0.5 TO 1 HR: Performed by: OTOLARYNGOLOGY

## 2019-06-03 PROCEDURE — 74011250636 HC RX REV CODE- 250/636

## 2019-06-03 PROCEDURE — 76210000006 HC OR PH I REC 0.5 TO 1 HR: Performed by: OTOLARYNGOLOGY

## 2019-06-03 PROCEDURE — 74011000250 HC RX REV CODE- 250: Performed by: OTOLARYNGOLOGY

## 2019-06-03 PROCEDURE — 77030031139 HC SUT VCRL2 J&J -A: Performed by: OTOLARYNGOLOGY

## 2019-06-03 PROCEDURE — 88184 FLOWCYTOMETRY/ TC 1 MARKER: CPT

## 2019-06-03 PROCEDURE — 88333 PATH CONSLTJ SURG CYTO XM 1: CPT

## 2019-06-03 PROCEDURE — 88185 FLOWCYTOMETRY/TC ADD-ON: CPT

## 2019-06-03 PROCEDURE — 87075 CULTR BACTERIA EXCEPT BLOOD: CPT

## 2019-06-03 PROCEDURE — 88341 IMHCHEM/IMCYTCHM EA ADD ANTB: CPT

## 2019-06-03 PROCEDURE — 76060000033 HC ANESTHESIA 1 TO 1.5 HR: Performed by: OTOLARYNGOLOGY

## 2019-06-03 PROCEDURE — 77030008462 HC STPLR SKN PROX J&J -A: Performed by: OTOLARYNGOLOGY

## 2019-06-03 PROCEDURE — 88312 SPECIAL STAINS GROUP 1: CPT

## 2019-06-03 PROCEDURE — 74011250637 HC RX REV CODE- 250/637: Performed by: OTOLARYNGOLOGY

## 2019-06-03 PROCEDURE — 88307 TISSUE EXAM BY PATHOLOGIST: CPT

## 2019-06-03 PROCEDURE — 77030008683 HC TU ET CUF COVD -A: Performed by: ANESTHESIOLOGY

## 2019-06-03 PROCEDURE — 74011250636 HC RX REV CODE- 250/636: Performed by: NURSE ANESTHETIST, CERTIFIED REGISTERED

## 2019-06-03 PROCEDURE — 87070 CULTURE OTHR SPECIMN AEROBIC: CPT

## 2019-06-03 PROCEDURE — 77030002996 HC SUT SLK J&J -A: Performed by: OTOLARYNGOLOGY

## 2019-06-03 PROCEDURE — 77030002916 HC SUT ETHLN J&J -A: Performed by: OTOLARYNGOLOGY

## 2019-06-03 PROCEDURE — 74011250637 HC RX REV CODE- 250/637: Performed by: NURSE ANESTHETIST, CERTIFIED REGISTERED

## 2019-06-03 RX ORDER — ONDANSETRON 2 MG/ML
INJECTION INTRAMUSCULAR; INTRAVENOUS AS NEEDED
Status: DISCONTINUED | OUTPATIENT
Start: 2019-06-03 | End: 2019-06-03 | Stop reason: HOSPADM

## 2019-06-03 RX ORDER — LIDOCAINE HYDROCHLORIDE 20 MG/ML
INJECTION, SOLUTION EPIDURAL; INFILTRATION; INTRACAUDAL; PERINEURAL AS NEEDED
Status: DISCONTINUED | OUTPATIENT
Start: 2019-06-03 | End: 2019-06-03 | Stop reason: HOSPADM

## 2019-06-03 RX ORDER — SODIUM CHLORIDE 0.9 % (FLUSH) 0.9 %
5-40 SYRINGE (ML) INJECTION AS NEEDED
Status: DISCONTINUED | OUTPATIENT
Start: 2019-06-03 | End: 2019-06-03 | Stop reason: HOSPADM

## 2019-06-03 RX ORDER — CEPHALEXIN 500 MG/1
500 CAPSULE ORAL 3 TIMES DAILY
Qty: 30 CAP | Refills: 0 | Status: SHIPPED | OUTPATIENT
Start: 2019-06-03 | End: 2019-06-13

## 2019-06-03 RX ORDER — SODIUM CHLORIDE 0.9 % (FLUSH) 0.9 %
5-40 SYRINGE (ML) INJECTION EVERY 8 HOURS
Status: DISCONTINUED | OUTPATIENT
Start: 2019-06-03 | End: 2019-06-03 | Stop reason: HOSPADM

## 2019-06-03 RX ORDER — FAMOTIDINE 20 MG/1
20 TABLET, FILM COATED ORAL ONCE
Status: COMPLETED | OUTPATIENT
Start: 2019-06-03 | End: 2019-06-03

## 2019-06-03 RX ORDER — PROPOFOL 10 MG/ML
INJECTION, EMULSION INTRAVENOUS AS NEEDED
Status: DISCONTINUED | OUTPATIENT
Start: 2019-06-03 | End: 2019-06-03 | Stop reason: HOSPADM

## 2019-06-03 RX ORDER — FENTANYL CITRATE 50 UG/ML
50 INJECTION, SOLUTION INTRAMUSCULAR; INTRAVENOUS AS NEEDED
Status: DISCONTINUED | OUTPATIENT
Start: 2019-06-03 | End: 2019-06-03 | Stop reason: HOSPADM

## 2019-06-03 RX ORDER — ONDANSETRON 2 MG/ML
4 INJECTION INTRAMUSCULAR; INTRAVENOUS ONCE
Status: DISCONTINUED | OUTPATIENT
Start: 2019-06-03 | End: 2019-06-03 | Stop reason: HOSPADM

## 2019-06-03 RX ORDER — HYDROMORPHONE HYDROCHLORIDE 1 MG/ML
0.5 INJECTION, SOLUTION INTRAMUSCULAR; INTRAVENOUS; SUBCUTANEOUS
Status: DISCONTINUED | OUTPATIENT
Start: 2019-06-03 | End: 2019-06-03 | Stop reason: HOSPADM

## 2019-06-03 RX ORDER — FENTANYL CITRATE 50 UG/ML
INJECTION, SOLUTION INTRAMUSCULAR; INTRAVENOUS AS NEEDED
Status: DISCONTINUED | OUTPATIENT
Start: 2019-06-03 | End: 2019-06-03 | Stop reason: HOSPADM

## 2019-06-03 RX ORDER — MIDAZOLAM HYDROCHLORIDE 1 MG/ML
INJECTION, SOLUTION INTRAMUSCULAR; INTRAVENOUS AS NEEDED
Status: DISCONTINUED | OUTPATIENT
Start: 2019-06-03 | End: 2019-06-03 | Stop reason: HOSPADM

## 2019-06-03 RX ORDER — SODIUM CHLORIDE, SODIUM LACTATE, POTASSIUM CHLORIDE, CALCIUM CHLORIDE 600; 310; 30; 20 MG/100ML; MG/100ML; MG/100ML; MG/100ML
75 INJECTION, SOLUTION INTRAVENOUS CONTINUOUS
Status: DISCONTINUED | OUTPATIENT
Start: 2019-06-04 | End: 2019-06-03 | Stop reason: HOSPADM

## 2019-06-03 RX ORDER — SUCCINYLCHOLINE CHLORIDE 20 MG/ML
INJECTION INTRAMUSCULAR; INTRAVENOUS AS NEEDED
Status: DISCONTINUED | OUTPATIENT
Start: 2019-06-03 | End: 2019-06-03 | Stop reason: HOSPADM

## 2019-06-03 RX ORDER — HYDROCODONE BITARTRATE AND ACETAMINOPHEN 5; 325 MG/1; MG/1
1 TABLET ORAL
Qty: 30 TAB | Refills: 0 | Status: SHIPPED | OUTPATIENT
Start: 2019-06-03 | End: 2019-06-10

## 2019-06-03 RX ORDER — HYDROCODONE BITARTRATE AND ACETAMINOPHEN 5; 325 MG/1; MG/1
1 TABLET ORAL ONCE
Status: COMPLETED | OUTPATIENT
Start: 2019-06-03 | End: 2019-06-03

## 2019-06-03 RX ORDER — PHENYLEPHRINE HCL IN 0.9% NACL 1 MG/10 ML
SYRINGE (ML) INTRAVENOUS AS NEEDED
Status: DISCONTINUED | OUTPATIENT
Start: 2019-06-03 | End: 2019-06-03 | Stop reason: HOSPADM

## 2019-06-03 RX ORDER — DEXAMETHASONE SODIUM PHOSPHATE 4 MG/ML
INJECTION, SOLUTION INTRA-ARTICULAR; INTRALESIONAL; INTRAMUSCULAR; INTRAVENOUS; SOFT TISSUE AS NEEDED
Status: DISCONTINUED | OUTPATIENT
Start: 2019-06-03 | End: 2019-06-03 | Stop reason: HOSPADM

## 2019-06-03 RX ADMIN — DEXAMETHASONE SODIUM PHOSPHATE 4 MG: 4 INJECTION, SOLUTION INTRA-ARTICULAR; INTRALESIONAL; INTRAMUSCULAR; INTRAVENOUS; SOFT TISSUE at 11:16

## 2019-06-03 RX ADMIN — MIDAZOLAM HYDROCHLORIDE 2 MG: 1 INJECTION, SOLUTION INTRAMUSCULAR; INTRAVENOUS at 11:07

## 2019-06-03 RX ADMIN — PROPOFOL 150 MG: 10 INJECTION, EMULSION INTRAVENOUS at 11:12

## 2019-06-03 RX ADMIN — SUCCINYLCHOLINE CHLORIDE 100 MG: 20 INJECTION INTRAMUSCULAR; INTRAVENOUS at 11:13

## 2019-06-03 RX ADMIN — LIDOCAINE HYDROCHLORIDE 100 MG: 20 INJECTION, SOLUTION EPIDURAL; INFILTRATION; INTRACAUDAL; PERINEURAL at 11:12

## 2019-06-03 RX ADMIN — SODIUM CHLORIDE, SODIUM LACTATE, POTASSIUM CHLORIDE, AND CALCIUM CHLORIDE 75 ML/HR: 600; 310; 30; 20 INJECTION, SOLUTION INTRAVENOUS at 09:04

## 2019-06-03 RX ADMIN — Medication 100 MCG: at 11:54

## 2019-06-03 RX ADMIN — ONDANSETRON 4 MG: 2 INJECTION INTRAMUSCULAR; INTRAVENOUS at 11:16

## 2019-06-03 RX ADMIN — PROPOFOL 100 MG: 10 INJECTION, EMULSION INTRAVENOUS at 11:49

## 2019-06-03 RX ADMIN — FENTANYL CITRATE 50 MCG: 50 INJECTION, SOLUTION INTRAMUSCULAR; INTRAVENOUS at 11:49

## 2019-06-03 RX ADMIN — FENTANYL CITRATE 50 MCG: 50 INJECTION, SOLUTION INTRAMUSCULAR; INTRAVENOUS at 11:12

## 2019-06-03 RX ADMIN — Medication 100 MCG: at 11:34

## 2019-06-03 RX ADMIN — HYDROCODONE BITARTRATE AND ACETAMINOPHEN 1 TABLET: 5; 325 TABLET ORAL at 14:38

## 2019-06-03 RX ADMIN — FAMOTIDINE 20 MG: 20 TABLET, FILM COATED ORAL at 09:04

## 2019-06-03 NOTE — ANESTHESIA POSTPROCEDURE EVALUATION
Procedure(s):  OPEN NECK BIOPSY/EXCISION OF LYMPH NODE.    general    Anesthesia Post Evaluation      Multimodal analgesia: multimodal analgesia used between 6 hours prior to anesthesia start to PACU discharge  Patient location during evaluation: bedside  Patient participation: complete - patient participated  Level of consciousness: awake  Pain management: adequate  Airway patency: patent  Anesthetic complications: no  Cardiovascular status: stable  Respiratory status: acceptable  Hydration status: acceptable  Post anesthesia nausea and vomiting:  controlled      Vitals Value Taken Time   /72 6/3/2019  1:01 PM   Temp 36.7 °C (98 °F) 6/3/2019 12:23 PM   Pulse 83 6/3/2019  1:11 PM   Resp 22 6/3/2019  1:11 PM   SpO2 95 % 6/3/2019  1:11 PM   Vitals shown include unvalidated device data.

## 2019-06-03 NOTE — DISCHARGE INSTRUCTIONS
DISCHARGE SUMMARY from Nurse    PATIENT INSTRUCTIONS:    After general anesthesia or intravenous sedation, for 24 hours or while taking prescription Narcotics:  · Limit your activities  · Do not drive and operate hazardous machinery  · Do not make important personal or business decisions  · Do  not drink alcoholic beverages  · If you have not urinated within 8 hours after discharge, please contact your surgeon on call. Report the following to your surgeon:  · Excessive pain, swelling, redness or odor of or around the surgical area  · Temperature over 100.5  · Nausea and vomiting lasting longer than 4 hours or if unable to take medications  · Any signs of decreased circulation or nerve impairment to extremity: change in color, persistent  numbness, tingling, coldness or increase pain  · Any questions    *  Please give a list of your current medications to your Primary Care Provider. *  Please update this list whenever your medications are discontinued, doses are      changed, or new medications (including over-the-counter products) are added. *  Please carry medication information at all times in case of emergency situations. These are general instructions for a healthy lifestyle:    No smoking/ No tobacco products/ Avoid exposure to second hand smoke  Surgeon General's Warning:  Quitting smoking now greatly reduces serious risk to your health. Obesity, smoking, and sedentary lifestyle greatly increases your risk for illness    A healthy diet, regular physical exercise & weight monitoring are important for maintaining a healthy lifestyle    You may be retaining fluid if you have a history of heart failure or if you experience any of the following symptoms:  Weight gain of 3 pounds or more overnight or 5 pounds in a week, increased swelling in our hands or feet or shortness of breath while lying flat in bed.   Please call your doctor as soon as you notice any of these symptoms; do not wait until your next office visit. Recognize signs and symptoms of STROKE:    F-face looks uneven    A-arms unable to move or move unevenly    S-speech slurred or non-existent    T-time-call 911 as soon as signs and symptoms begin-DO NOT go       Back to bed or wait to see if you get better-TIME IS BRAIN. Warning Signs of HEART ATTACK     Call 911 if you have these symptoms:   Chest discomfort. Most heart attacks involve discomfort in the center of the chest that lasts more than a few minutes, or that goes away and comes back. It can feel like uncomfortable pressure, squeezing, fullness, or pain.  Discomfort in other areas of the upper body. Symptoms can include pain or discomfort in one or both arms, the back, neck, jaw, or stomach.  Shortness of breath with or without chest discomfort.  Other signs may include breaking out in a cold sweat, nausea, or lightheadedness. Don't wait more than five minutes to call 911 - MINUTES MATTER! Fast action can save your life. Calling 911 is almost always the fastest way to get lifesaving treatment. Emergency Medical Services staff can begin treatment when they arrive -- up to an hour sooner than if someone gets to the hospital by car. The discharge information has been reviewed with the patient/sister. The patient/sister verbalized understanding. Discharge medications reviewed with the patient and appropriate educational materials and side effects teaching were provided.   ___________________________________________________________________________________________________________________________________

## 2019-06-03 NOTE — ANESTHESIA PREPROCEDURE EVALUATION
Relevant Problems   No relevant active problems       Anesthetic History   No history of anesthetic complications            Review of Systems / Medical History  Patient summary reviewed and pertinent labs reviewed    Pulmonary    COPD: moderate               Neuro/Psych   Within defined limits           Cardiovascular    Hypertension: well controlled          Past MI, CAD and cardiac stents    Exercise tolerance: <4 METS     GI/Hepatic/Renal  Within defined limits              Endo/Other        Arthritis and cancer     Other Findings              Physical Exam    Airway  Mallampati: III  TM Distance: 4 - 6 cm  Neck ROM: decreased range of motion   Mouth opening: Normal     Cardiovascular    Rhythm: regular  Rate: normal         Dental    Dentition: Full lower dentures and Full upper dentures     Pulmonary  Breath sounds clear to auscultation               Abdominal  GI exam deferred       Other Findings            Anesthetic Plan    ASA: 3  Anesthesia type: general          Induction: Intravenous  Anesthetic plan and risks discussed with: Patient

## 2019-06-03 NOTE — BRIEF OP NOTE
BRIEF OPERATIVE NOTE    Date of Procedure: 6/3/2019   Preoperative Diagnosis: Neck mass [R22.1]  Postoperative Diagnosis: Neck mass [R22.1]    Procedure(s):  OPEN NECK BIOPSY/EXCISION OF LYMPH NODE  Surgeon(s) and Role:     Charley Caldwell MD - Primary         Surgical Assistant:none*    Surgical Staff:  Circ-1: Meryle Romp  Scrub Tech-1: Kittredge Hortensia  Surg Asst-1: Jamaal Lay  Event Time In Time Out   Incision Start 1126    Incision Close       Anesthesia: General   Estimated Blood Loss: 50ml*  Specimens:   ID Type Source Tests Collected by Time Destination   1 : anterior neck mass Fresh Neck  Edu Joseph MD 6/3/2019 1139 Pathology   2 : level 3 lymph node Fresh Neck  Edu Joseph MD 6/3/2019 1157 Pathology   1 : contents of neck mass Wound Neck CULTURE, AEROBIC AND ANAEROBIC, CULTURE, WOUND W Abram Bhatti MD 6/3/2019 1128 Microbiology      Findings: pos abscess, deep neck mass   Complications: *none  Implants: * No implants in log *

## 2019-06-04 NOTE — OP NOTES
[address] [place] [address]   OPERATIVE REPORT    Name:  Bradford Jefferson  MR#:   [id number]  :  1955  ACCOUNT #:  [de-identified]  DATE OF SERVICE:  2019    PREOPERATIVE DIAGNOSIS:  Right-sided neck mass. POSTOPERATIVE DIAGNOSIS:  Right-sided neck mass. PROCEDURES PERFORMED:  1. Incision and drainage, neck mass. 2.  Excision, abscess cavity from number 1.  3.  Excision, deep lymph node, right side. SURGEON:  Jamari Acosta MD    ASSISTANT:  None. ANESTHESIA:  General endotracheal anesthesia. COMPLICATIONS:  None. SPECIMENS REMOVED:  To path. IMPLANTS:  None. DRAINS:  One Amye Peguero drain. ESTIMATED BLOOD LOSS:  50 mL. OPERATIVE PROCEDURE:  The patient was brought to the operating room and placed supine on the operating room table. General endotracheal anesthesia was given per Anesthesiology Department. Once the patient was sufficiently anesthetized, an incision was made over the neck mass on the patient's right side. The neck mass was noted to be just anterior to the sternocleidomastoid level 3. Incision was made over the mass itself and just below the mass was a large abscess cavity, which was drained. This did appear to be a sterile abscess; however, cultures were obtained and sent. This was both for anaerobic and aerobic cultures. The abscess cavity itself was noted to be fairly thick walled, it was decided to remove this. I was uncertain whether the patient, who has had a history of a lymphoma in the past, might be having some necrotic-appearing lymph node. The mass was completely excised without any difficulty. This was noted to be at the level of the sternocleidomastoid and just below this. This was sent for Pathology. The pathologist via frozen section did not reveal any obvious worrisome features; hence, it was decided to ensure no other significant findings were noted deeper to this area.   In the jugulodigastric area, there was a smaller lymph node just above the jugular vein. This was resected and removed, no difficulties were encountered. Some bleeding points were noted as well, no other significant findings were seen. Once this was complete, the wound was irrigated and closed in layers after a drain was placed. A 10-Ghanaian fluted drain was placed. Two-layer closure was performed. The patient tolerated this well. The patient was taken from the operating room to the recovery room in stable condition after correct needle and sponge counts were obtained.         Bandar Santoro MD      [id number]  D:  06/03/2019 12:18  T:  06/03/2019 16:05  JOB #:  [id number]

## 2019-06-08 LAB
BACTERIA SPEC CULT: NORMAL
BACTERIA SPEC CULT: NORMAL
GRAM STN SPEC: NORMAL
GRAM STN SPEC: NORMAL
SERVICE CMNT-IMP: NORMAL
SERVICE CMNT-IMP: NORMAL

## 2019-06-19 ENCOUNTER — TELEPHONE (OUTPATIENT)
Dept: VASCULAR SURGERY | Age: 64
End: 2019-06-19

## 2019-06-19 NOTE — TELEPHONE ENCOUNTER
Patient ultrasound done in our office today shows critical arterial insufficiency of the right lower extremity with SFA and popliteal artery occlusions  This is a significant change from his previous studies done in December  Patient states that he is feeling okay and is not having any rest pain in his legs  He states that he did have to stop his blood thinners due to a recent surgery that he had 2 weeks ago  He is scheduled to come in next week and I discussed that seeing as he is feeling okay and not having any signs or symptoms of critical limb ischemia we can talk further at his follow-up visit. Patient expresses understanding and agrees to this plan.   He will call the office sooner as needed

## 2019-06-27 ENCOUNTER — OFFICE VISIT (OUTPATIENT)
Dept: VASCULAR SURGERY | Age: 64
End: 2019-06-27

## 2019-06-27 VITALS
HEIGHT: 72 IN | SYSTOLIC BLOOD PRESSURE: 120 MMHG | RESPIRATION RATE: 16 BRPM | HEART RATE: 67 BPM | DIASTOLIC BLOOD PRESSURE: 70 MMHG | WEIGHT: 177 LBS | BODY MASS INDEX: 23.98 KG/M2

## 2019-06-27 DIAGNOSIS — I70.201 POPLITEAL ARTERY OCCLUSION, RIGHT (HCC): ICD-10-CM

## 2019-06-27 DIAGNOSIS — D84.9 IMMUNOCOMPROMISED STATE (HCC): ICD-10-CM

## 2019-06-27 DIAGNOSIS — I70.221 ATHEROSCLEROSIS OF NATIVE ARTERY OF RIGHT LOWER EXTREMITY WITH REST PAIN (HCC): Primary | ICD-10-CM

## 2019-06-27 DIAGNOSIS — C85.90 NON-HODGKIN'S LYMPHOMA, UNSPECIFIED BODY REGION, UNSPECIFIED NON-HODGKIN LYMPHOMA TYPE (HCC): ICD-10-CM

## 2019-06-27 DIAGNOSIS — M19.90 ARTHRITIS: ICD-10-CM

## 2019-06-27 DIAGNOSIS — I70.209 SUPERFICIAL FEMORAL ARTERY OCCLUSION (HCC): ICD-10-CM

## 2019-06-27 RX ORDER — ASPIRIN 325 MG
325 TABLET ORAL DAILY
COMMUNITY

## 2019-06-27 RX ORDER — CLOPIDOGREL BISULFATE 75 MG/1
TABLET ORAL
COMMUNITY
End: 2019-08-07 | Stop reason: SDUPTHER

## 2019-06-27 NOTE — PROGRESS NOTES
Dave Al Jr. Chief Complaint   Patient presents with    Leg Pain        History and Physical    Dave Barker. is a 58 y.o. male with bilateral lower extremity claudication who presents today in follow up. He is status post right common femoral endarterectomy with remote endarterectomy of the right superficial femoral artery and above knee popliteal artery and transluminal intravascular placement of stent within the above knee popliteal artery and superficial femoral artery, 7 x 150 Viabahn stents x 2 in October of 2018. He is also status post left lower extremity angiogram with SFA and popliteal atherectomy and balloon angioplasty in August of 2018. He presents to the office today for his six-month follow-up. He did have a recent neck surgery and was taken off of his antiplatelet medications for a week. He has noticed worsening right leg claudication and he has been having some rest pain in his foot mostly at nighttime. He denies any skin changes and no ulcers. No fevers or chills. This is all new since his neck surgery. He does also report that he is to start chemotherapy infusions for his severe rheumatoid arthritis this coming Monday. He did have follow-up arterial studies which revealed critical arterial insufficiency notable right lower extremity. Mid to distal superficial femoral artery and popliteal artery appear occluded right lower extremity. No evidence of arterial insufficiency left lower extremity. Left ankle-brachial index is normal with triphasic waveforms.       Past Medical History:   Diagnosis Date    Arthritis     CAD (coronary artery disease)     Cancer of bone ([address]     COPD     Heart failure ([address]     stents x2 in 2008    History of chemotherapy     History of radiation therapy     Hypertension     Non Hodgkin's lymphoma ([address] 2013    had chemo and rad     Past Surgical History:   Procedure Laterality Date    CARDIAC SURG PROCEDURE UNLIST      Stented x2- 2008    COLONOSCOPY N/A 8/15/2016    COLONOSCOPY with polypectomy, biopsy and clip performed by Batool Landaverde MD at [address]   6-5-13    HX HEART CATHETERIZATION  6-5-13    HX HEENT      Deviated septum,     HX OTHER SURGICAL      skin cancer excision face    HX VASCULAR STENT      right leg x 2 October 2018 (Dr Arnold Min vascular surgeon)   [facility] ING HERNIA,5+Y/O,REDUCIBL Right 12/05/2017    Dr. Dorian Cook     Patient Active Problem List   Diagnosis Code    Sepsis(995.91) A41.9    Immunocompromised state ([address] D84.9    Anemia, unspecified D64.9    Bone cancer ([address] C41.9    PAD (peripheral artery disease) ([address] I73.9    Aortic ectasia, abdominal ([address] I77.811    Iliac artery aneurysm, left ([address] I72.3    Right inguinal hernia K40.90    Atherosclerosis of native artery of both lower extremities with intermittent claudication (HCC) I70.213     Current Outpatient Medications   Medication Sig Dispense Refill    cyclobenzaprine (FLEXERIL) 5 mg tablet Take 2 Tabs by mouth three (3) times daily (with meals). 9 Tab 0    cyclobenzaprine (FLEXERIL) 5 mg tablet Take 2 Tabs by mouth three (3) times daily (with meals). 9 Tab 0    chlorproMAZINE (THORAZINE) 10 mg tablet Take 2 Tabs by mouth every six (6) hours as needed. 10 Tab 0    clopidogrel (PLAVIX) 75 mg tab Take 1 Tab by mouth daily. 30 Tab 3    oxyCODONE-acetaminophen (PERCOCET) 5-325 mg per tablet Take 1 Tab by mouth every four (4) hours as needed. Max Daily Amount: 6 Tabs. 30 Tab 0    hydroxychloroquine (PLAQUENIL) 200 mg tablet Take 200 mg by mouth daily.  leflunomide (ARAVA) 20 mg tablet Take 20 mg by mouth daily.  predniSONE (DELTASONE) 5 mg tablet Take  by mouth daily. Take 1 to 3 tabs daily      albuterol (PROVENTIL HFA, VENTOLIN HFA, PROAIR HFA) 90 mcg/actuation inhaler Take 2 Puffs by inhalation every four (4) hours as needed for Wheezing or Shortness of Breath.  Indications: BRONCHOSPASM PREVENTION, Chronic Obstructive Pulmonary Disease 1 Inhaler 0    atorvastatin (LIPITOR) 20 mg tablet Take  by mouth daily.  cholecalciferol, VITAMIN D3, (VITAMIN D3) 5,000 unit tab tablet Take 5,000 Units by mouth daily.  aspirin (ASPIRIN) 325 mg tablet Take 325 mg by mouth daily.  esomeprazole (NEXIUM) 20 mg capsule Take 20 mg by mouth daily. No Known Allergies    Physical Exam:    Visit Vitals  /72 (BP 1 Location: Left arm, BP Patient Position: Sitting)   Pulse 92   Resp 17   Ht 6' (1.829 m)   Wt 192 lb (87.1 kg)   BMI 26.04 kg/m²      General: Well-appearing male in no acute distress   HEENT: EOMI, no scleral icterus is noted. Right neck incision c/d/i. Pulmonary: No increased work or breathing is noted. Abdomen: nondistended. Extremities: Warm and well perfused bilaterally. Pt has no BLE edema. No ulcers. Neuro: Cranial nerves II through XII are grossly intact   Integument: No ulcerations are identified visibly      Impression and Plan:  Dave Castillo is a 58 y.o. male with bilateral lower extremity claudication. He is status post left leg angiogram as well as right common femoral endarterectomy with SFA and popliteal artery stenting as above. Unfortunately he has a reocclusion of the right superficial femoral artery and popliteal arteries with critical arterial insufficiency noted as above. He is complaining of worsening right leg claudication and now having rest pain in the right foot. He has restarted his antiplatelet therapy with aspirin 325 mg daily and Plavix. I did recommend intervention with right leg angiogram at this time. We discussed the possibility of bypass surgery if his occlusions are unable to be resolved with endovascular surgery. He expresses understanding to all of this and would like to move forward with angiogram intervention at this time. He will follow-up afterwards as scheduled. Plan was discussed.   Patient expresses understanding and agrees. Suzanne Hernandez  [phone]        PLEASE NOTE:  This document has been produced using voice recognition software. Unrecognized errors in transcription may be present.

## 2019-06-27 NOTE — PROGRESS NOTES
1. Have you been to an emergency room or urgent care clinic since your last visit? NO    Hospitalized since your last visit? If yes, where, when, and reason for visit? No  2. Have you seen or consulted any other [facility] providers [facility] since your [facility] visit including any procedures, health maintenance items. If yes, where, when and reason for visit?  Planned SURGERY

## 2019-06-27 NOTE — H&P (VIEW-ONLY)
Dave JAZZY Servando Tolbert. Chief Complaint Patient presents with  Leg Pain History and Physical   
Dave Meehan. is a 58 y.o. male with bilateral lower extremity claudication who presents today in follow up. He is status post right common femoral endarterectomy with remote endarterectomy of the right superficial femoral artery and above knee popliteal artery and transluminal intravascular placement of stent within the above knee popliteal artery and superficial femoral artery, 7 x 150 Viabahn stents x 2 in October of 2018. He is also status post left lower extremity angiogram with SFA and popliteal atherectomy and balloon angioplasty in August of 2018. He presents to the office today for his six-month follow-up. He did have a recent neck surgery and was taken off of his antiplatelet medications for a week. He has noticed worsening right leg claudication and he has been having some rest pain in his foot mostly at nighttime. He denies any skin changes and no ulcers. No fevers or chills. This is all new since his neck surgery. He does also report that he is to start chemotherapy infusions for his severe rheumatoid arthritis this coming Monday. He did have follow-up arterial studies which revealed critical arterial insufficiency notable right lower extremity. Mid to distal superficial femoral artery and popliteal artery appear occluded right lower extremity. No evidence of arterial insufficiency left lower extremity. Left ankle-brachial index is normal with triphasic waveforms. Past Medical History:  
Diagnosis Date  Arthritis  CAD (coronary artery disease)  Cancer of bone ([address]  COPD  Heart failure ([address] stents x2 in 2008  History of chemotherapy  History of radiation therapy  Hypertension  Non Hodgkin's lymphoma ([address] 2013  
 had chemo and rad Past Surgical History:  
Procedure Laterality Date  CARDIAC SURG PROCEDURE UNLIST Stented x2- 2008  COLONOSCOPY N/A 8/15/2016 COLONOSCOPY with polypectomy, biopsy and clip performed by Therese Haskins MD at [place] [address]  6-5-13  
 HX HEART CATHETERIZATION  6-5-13  
 HX HEENT Deviated septum,   
 HX OTHER SURGICAL    
 skin cancer excision face  HX VASCULAR STENT    
 right leg x 2 October 2018 (Dr Edie Clemente vascular surgeon)  REPAIR ING HERNIA,5+Y/O,REDUCIBL Right 12/05/2017 Dr. Martha Jackson Patient Active Problem List  
Diagnosis Code  Sepsis(995.91) A41.9  Immunocompromised state ([facility] D84.9  Anemia, unspecified D64.9  Bone cancer (HCC) C41.9  
 PAD (peripheral artery disease) ([facility]) I73.9  Aortic ectasia, abdominal ([facility] N78.154  Iliac artery aneurysm, left ([facility]) I72.3  Right inguinal hernia K40.90  Atherosclerosis of native artery of both lower extremities with intermittent claudication ([facility] L19.535 Current Outpatient Medications Medication Sig Dispense Refill  cyclobenzaprine (FLEXERIL) 5 mg tablet Take 2 Tabs by mouth three (3) times daily (with meals). 9 Tab 0  cyclobenzaprine (FLEXERIL) 5 mg tablet Take 2 Tabs by mouth three (3) times daily (with meals). 9 Tab 0  chlorproMAZINE (THORAZINE) 10 mg tablet Take 2 Tabs by mouth every six (6) hours as needed. 10 Tab 0  clopidogrel (PLAVIX) 75 mg tab Take 1 Tab by mouth daily. 30 Tab 3  
 oxyCODONE-acetaminophen (PERCOCET) 5-325 mg per tablet Take 1 Tab by mouth every four (4) hours as needed. Max Daily Amount: 6 Tabs. 30 Tab 0  
 hydroxychloroquine (PLAQUENIL) 200 mg tablet Take 200 mg by mouth daily.  leflunomide (ARAVA) 20 mg tablet Take 20 mg by mouth daily.  predniSONE (DELTASONE) 5 mg tablet Take  by mouth daily. Take 1 to 3 tabs daily  albuterol (PROVENTIL HFA, VENTOLIN HFA, PROAIR HFA) 90 mcg/actuation inhaler Take 2 Puffs by inhalation every four (4) hours as needed for Wheezing or Shortness of Breath. Indications: BRONCHOSPASM PREVENTION, Chronic Obstructive Pulmonary Disease 1 Inhaler 0  
 atorvastatin (LIPITOR) 20 mg tablet Take  by mouth daily.  cholecalciferol, VITAMIN D3, (VITAMIN D3) 5,000 unit tab tablet Take 5,000 Units by mouth daily.  aspirin (ASPIRIN) 325 mg tablet Take 325 mg by mouth daily.  esomeprazole (NEXIUM) 20 mg capsule Take 20 mg by mouth daily. No Known Allergies Physical Exam:   
Visit Vitals /72 (BP 1 Location: Left arm, BP Patient Position: Sitting) Pulse 92 Resp 17 Ht 6' (1.829 m) Wt 192 lb (87.1 kg) BMI 26.04 kg/m² General: Well-appearing male in no acute distress HEENT: EOMI, no scleral icterus is noted. Right neck incision c/d/i. Pulmonary: No increased work or breathing is noted. Abdomen: nondistended. Extremities: Warm and well perfused bilaterally. Pt has no BLE edema. No ulcers. Neuro: Cranial nerves II through XII are grossly intact Integument: No ulcerations are identified visibly Impression and Plan: 
Dave Donovan. is a 58 y.o. male with bilateral lower extremity claudication. He is status post left leg angiogram as well as right common femoral endarterectomy with SFA and popliteal artery stenting as above. Unfortunately he has a reocclusion of the right superficial femoral artery and popliteal arteries with critical arterial insufficiency noted as above. He is complaining of worsening right leg claudication and now having rest pain in the right foot. He has restarted his antiplatelet therapy with aspirin 325 mg daily and Plavix. I did recommend intervention with right leg angiogram at this time. We discussed the possibility of bypass surgery if his occlusions are unable to be resolved with endovascular surgery. He expresses understanding to all of this and would like to move forward with angiogram intervention at this time.   He will follow-up afterwards as scheduled. Plan was discussed. Patient expresses understanding and agrees. Stylesnina Uriostegui 
[phone] PLEASE NOTE: 
This document has been produced using voice recognition software. Unrecognized errors in transcription may be present.

## 2019-06-27 NOTE — LETTER
6/27/19 Patient: Dave Al Jr. YOB: 1955 Date of Visit: 6/27/2019 MD Ángel Brown [address] [place] [id number] VIA Facsimile: [phone] Dear Shawna Stevenson MD, Thank you for referring Mr. Brenda Alejo to [facility] for evaluation. My notes for this consultation are attached. If you have questions, please do not hesitate to call me. I look forward to following your patient along with you. Sincerely, [address] [place] [address]

## 2019-07-05 ENCOUNTER — HOSPITAL ENCOUNTER (OUTPATIENT)
Age: 64
Setting detail: OUTPATIENT SURGERY
Discharge: HOME | End: 2019-07-05
Attending: SURGERY | Admitting: SURGERY
Payer: MEDICARE

## 2019-07-05 VITALS
HEART RATE: 68 BPM | OXYGEN SATURATION: 99 % | WEIGHT: 182 LBS | RESPIRATION RATE: 18 BRPM | DIASTOLIC BLOOD PRESSURE: 69 MMHG | SYSTOLIC BLOOD PRESSURE: 104 MMHG | BODY MASS INDEX: 24.12 KG/M2 | HEIGHT: 73 IN

## 2019-07-05 DIAGNOSIS — I70.211 ATHEROSCLEROSIS OF NATIVE ARTERY OF RIGHT LOWER EXTREMITY WITH INTERMITTENT CLAUDICATION (HCC): ICD-10-CM

## 2019-07-05 LAB
ANION GAP BLD CALC-SCNC: 18 MMOL/L (ref 10–20)
BUN BLD-MCNC: 10 MG/DL (ref 7–18)
CA-I BLD-MCNC: 1.25 MMOL/L (ref 1.12–1.32)
CHLORIDE BLD-SCNC: 104 MMOL/L (ref 100–108)
CO2 BLD-SCNC: 23 MMOL/L (ref 19–24)
CREAT UR-MCNC: 0.9 MG/DL (ref 0.6–1.3)
GLUCOSE BLD STRIP.AUTO-MCNC: 98 MG/DL (ref 74–106)
HCT VFR BLD CALC: 35 % (ref 36–49)
HGB BLD-MCNC: 11.9 G/DL (ref 12–16)
POTASSIUM BLD-SCNC: 4.3 MMOL/L (ref 3.5–5.5)
SODIUM BLD-SCNC: 139 MMOL/L (ref 136–145)

## 2019-07-05 PROCEDURE — C1894 INTRO/SHEATH, NON-LASER: HCPCS | Performed by: SURGERY

## 2019-07-05 PROCEDURE — 74011250636 HC RX REV CODE- 250/636

## 2019-07-05 PROCEDURE — C1769 GUIDE WIRE: HCPCS | Performed by: SURGERY

## 2019-07-05 PROCEDURE — 77030013744: Performed by: SURGERY

## 2019-07-05 PROCEDURE — 74011636320 HC RX REV CODE- 636/320: Performed by: SURGERY

## 2019-07-05 PROCEDURE — C1760 CLOSURE DEV, VASC: HCPCS | Performed by: SURGERY

## 2019-07-05 PROCEDURE — 76937 US GUIDE VASCULAR ACCESS: CPT | Performed by: SURGERY

## 2019-07-05 PROCEDURE — 77030028790 HC ACC ST CTRL VLV COPLT ABBT -B: Performed by: SURGERY

## 2019-07-05 PROCEDURE — 75625 CONTRAST EXAM ABDOMINL AORTA: CPT | Performed by: SURGERY

## 2019-07-05 PROCEDURE — 36246 INS CATH ABD/L-EXT ART 2ND: CPT | Performed by: SURGERY

## 2019-07-05 PROCEDURE — 74011250636 HC RX REV CODE- 250/636: Performed by: SURGERY

## 2019-07-05 PROCEDURE — 75710 ARTERY X-RAYS ARM/LEG: CPT | Performed by: SURGERY

## 2019-07-05 PROCEDURE — 77030004530 HC CATH ANGI DX IMGR BSC -A: Performed by: SURGERY

## 2019-07-05 PROCEDURE — 80047 BASIC METABLC PNL IONIZED CA: CPT

## 2019-07-05 PROCEDURE — 99152 MOD SED SAME PHYS/QHP 5/>YRS: CPT | Performed by: SURGERY

## 2019-07-05 RX ORDER — ONDANSETRON 2 MG/ML
4 INJECTION INTRAMUSCULAR; INTRAVENOUS
Status: DISCONTINUED | OUTPATIENT
Start: 2019-07-05 | End: 2019-07-05 | Stop reason: HOSPADM

## 2019-07-05 RX ORDER — SODIUM CHLORIDE 0.9 % (FLUSH) 0.9 %
5-40 SYRINGE (ML) INJECTION EVERY 8 HOURS
Status: DISCONTINUED | OUTPATIENT
Start: 2019-07-05 | End: 2019-07-05 | Stop reason: HOSPADM

## 2019-07-05 RX ORDER — OXYCODONE AND ACETAMINOPHEN 5; 325 MG/1; MG/1
1 TABLET ORAL
Status: DISCONTINUED | OUTPATIENT
Start: 2019-07-05 | End: 2019-07-05 | Stop reason: HOSPADM

## 2019-07-05 RX ORDER — ACETAMINOPHEN 325 MG/1
650 TABLET ORAL
Status: DISCONTINUED | OUTPATIENT
Start: 2019-07-05 | End: 2019-07-05 | Stop reason: HOSPADM

## 2019-07-05 RX ORDER — MIDAZOLAM HYDROCHLORIDE 1 MG/ML
INJECTION, SOLUTION INTRAMUSCULAR; INTRAVENOUS AS NEEDED
Status: DISCONTINUED | OUTPATIENT
Start: 2019-07-05 | End: 2019-07-05 | Stop reason: HOSPADM

## 2019-07-05 RX ORDER — DIPHENHYDRAMINE HYDROCHLORIDE 50 MG/ML
12.5 INJECTION, SOLUTION INTRAMUSCULAR; INTRAVENOUS
Status: DISCONTINUED | OUTPATIENT
Start: 2019-07-05 | End: 2019-07-05 | Stop reason: HOSPADM

## 2019-07-05 RX ORDER — FENTANYL CITRATE 50 UG/ML
INJECTION, SOLUTION INTRAMUSCULAR; INTRAVENOUS AS NEEDED
Status: DISCONTINUED | OUTPATIENT
Start: 2019-07-05 | End: 2019-07-05 | Stop reason: HOSPADM

## 2019-07-05 RX ORDER — SODIUM CHLORIDE 0.9 % (FLUSH) 0.9 %
5-40 SYRINGE (ML) INJECTION AS NEEDED
Status: DISCONTINUED | OUTPATIENT
Start: 2019-07-05 | End: 2019-07-05 | Stop reason: HOSPADM

## 2019-07-05 RX ORDER — HEPARIN SODIUM 1000 [USP'U]/ML
INJECTION, SOLUTION INTRAVENOUS; SUBCUTANEOUS AS NEEDED
Status: DISCONTINUED | OUTPATIENT
Start: 2019-07-05 | End: 2019-07-05 | Stop reason: HOSPADM

## 2019-07-05 RX ORDER — HEPARIN SODIUM 200 [USP'U]/100ML
INJECTION, SOLUTION INTRAVENOUS
Status: COMPLETED | OUTPATIENT
Start: 2019-07-05 | End: 2019-07-05

## 2019-07-05 RX ORDER — MORPHINE SULFATE 10 MG/ML
1 INJECTION, SOLUTION INTRAMUSCULAR; INTRAVENOUS
Status: DISCONTINUED | OUTPATIENT
Start: 2019-07-05 | End: 2019-07-05 | Stop reason: HOSPADM

## 2019-07-05 RX ORDER — LIDOCAINE HYDROCHLORIDE 10 MG/ML
INJECTION, SOLUTION EPIDURAL; INFILTRATION; INTRACAUDAL; PERINEURAL AS NEEDED
Status: DISCONTINUED | OUTPATIENT
Start: 2019-07-05 | End: 2019-07-05 | Stop reason: HOSPADM

## 2019-07-05 NOTE — Clinical Note
Sheath #1: Sheath: inserted. Sheath inserted/placed in the left femoral  artery. Hemostasis achieved.

## 2019-07-05 NOTE — DISCHARGE INSTRUCTIONS
Patient Education        Angiogram: What to Expect at Home  Your Recovery  An angiogram is an X-ray test that uses dye and a camera to take pictures of the blood flow in an artery or a vein. The doctor inserted a thin, flexible tube (catheter) into a blood vessel in your groin. In some cases, the catheter is placed in a blood vessel in the arm. An angiogram is done for many reasons. For example, you may have an angiogram to find the source of bleeding, such as an ulcer. Or it may be done to look for blocked blood vessels in your lungs. After an angiogram, your groin or arm may have a bruise and feel sore for a day or two. You can do light activities around the house but nothing strenuous for several days. Your doctor may give you specific instructions on when you can do your normal activities again, such as driving and going back to work. This care sheet gives you a general idea about how long it will take for you to recover. But each person recovers at a different pace. Follow the steps below to feel better as quickly as possible. How can you care for yourself at home? Activity    · Do not do strenuous exercise and do not lift, pull, or push anything heavy until your doctor says it is okay. This may be for a day or two. You can walk around the house and do light activity, such as cooking.     · You may shower 24 to 48 hours after the procedure, if your doctor okays it. Pat the incision dry. Do not take a bath for 1 week, or until your doctor tells you it is okay.     · If the catheter was placed in your groin, try not to walk up stairs for the first couple of days.     · If the catheter was placed in your arm near your wrist, do not bend your wrist deeply for the first couple of days. Be careful using your hand to get into and out of a chair or bed.     · If your doctor recommends it, get more exercise. Walking is a good choice. Bit by bit, increase the amount you walk every day.  Try for at least 30 minutes on most days of the week. Diet    · Drink plenty of fluids to help your body flush out the dye. If you have kidney, heart, or liver disease and have to limit fluids, talk with your doctor before you increase the amount of fluids you drink.     · You can eat your normal diet. If your stomach is upset, try bland, low-fat foods like plain rice, broiled chicken, toast, and yogurt. Medicines    · Be safe with medicines. Read and follow all instructions on the label. ? If the doctor gave you a prescription medicine for pain, take it as prescribed. ? If you are not taking a prescription pain medicine, ask your doctor if you can take an over-the-counter medicine.     · If you take blood thinners, such as warfarin (Coumadin), clopidogrel (Plavix), or aspirin, be sure to talk to your doctor. He or she will tell you if and when to start taking those medicines again. Make sure that you understand exactly what your doctor wants you to do.     · Your doctor will tell you if and when you can restart your medicines. He or she will also give you instructions about taking any new medicines.    Care of the catheter site    · You will have a dressing over the cut (incision). A dressing helps the incision heal and protects it. Your doctor will tell you how to take care of this.     · Put ice or a cold pack on the area for 10 to 20 minutes at a time to help with soreness or swelling. Put a thin cloth between the ice and your skin. Follow-up care is a key part of your treatment and safety. Be sure to make and go to all appointments, and call your doctor if you are having problems. It's also a good idea to know your test results and keep a list of the medicines you take. When should you call for help? Call 911 anytime you think you may need emergency care.  For example, call if:    · You passed out (lost consciousness).     · You have severe trouble breathing.     · You have sudden chest pain and shortness of breath, or you cough up blood.    Call your doctor now or seek immediate medical care if:    · You are bleeding from the area where the catheter was put in your artery.     · You have a fast-growing, painful lump at the catheter site.     · You have signs of infection, such as:  ? Increased pain, swelling, warmth, or redness. ? Red streaks leading from the incision. ? Pus draining from the incision. ? A fever.    Watch closely for any changes in your health, and be sure to contact your doctor if:    · You don't get better as expected. Where can you learn more? Go to http://messi-thang.info/. Enter U552 in the search box to learn more about \"Angiogram: What to Expect at Home. \"  Current as of: June 25, 2018  Content Version: 11.9  © 5641-5708 [employer]. Care instructions adapted under license by [employer] (which disclaims liability or warranty for this information). If you have questions about a medical condition or this instruction, always ask your healthcare professional. [place] [address] any warranty or liability for your use of this information.

## 2019-07-05 NOTE — Clinical Note
TRANSFER - OUT REPORT:  
 
Verbal report given to: Geovanny Banks. Report consisted of patient's Situation, Background, Assessment and  
Recommendations(SBAR). Opportunity for questions and clarification was provided. Patient transported with a Cardiac Cath Tech / Patient Care Tech. Patient transported to: [address]

## 2019-07-05 NOTE — Clinical Note
TRANSFER - IN REPORT:  
 
Verbal report received from: [facility]. Report consisted of patient's Situation, Background, Assessment and  
Recommendations(SBAR). Opportunity for questions and clarification was provided. Assessment completed upon patient's arrival to unit and care assumed. Patient transported with a Cardiac Cath Tech / Patient Care Tech.

## 2019-07-05 NOTE — INTERVAL H&P NOTE
H&P Update:  Dave PURCELL Servando Armenta was seen and examined. History and physical has been reviewed. The patient has been examined.  There have been no significant clinical changes since the completion of the originally dated History and Physical.

## 2019-07-05 NOTE — Clinical Note
Bilateral groin clipped, and draped. Wet prep solution applied at: 927. Wet prep solution dried at: 931. Wet prep elapsed drying time: 4 mins.

## 2019-07-05 NOTE — Clinical Note
Vessel: right PFA, SFA, popliteal, PTA, peroneal and DOUGLAS, Power injection to the artery. PSI = 600. Rate of rise = 0.5 sec. Injection rate = 4 mL/sec. Total injected volume = 40 mL.

## 2019-07-05 NOTE — Clinical Note
Contrast Dose Calculator:  
Patient's age: 71.  
Patient's sex: Male. Patient weight (kg) = 82.6. Creatinine level (mg/dL) = 0.9. Creatinine clearance (mL/min): 95.6. Contrast concentration (mg/mL) = 300. MACD = 300 mL. Max Contrast dose per Creatinine Cl calculator = 215.1 mL.

## 2019-07-05 NOTE — Clinical Note
Vessel: bilateral AA w/ Runoff, AA w/ Runoff, Power injection to the artery. Single view taken. PSI = 1000. Rate of rise = 0.5 sec. Injection rate = 15 mL/sec. Total injected volume = 30 mL.

## 2019-07-05 NOTE — PROGRESS NOTES
2087 Pt admitted to Cath holding to [facility], [facility] 1. Pt placed on monitors. Reviewed pt hx, pt meds, pt allergies and assessment completed. PIVx 1 obtained see opassessment. Pt noted to be in reynaldo cardiac rhythm. Prepped groin bialterally for procedure. MD notified of need of H&P and consent. Family at bedside. No acute distress noted No c/o verbalized. 5853 Dr. Marlys Weiner at bedside questions answered and consent signed. H7P completed. 2000 Pre-timeout completed at bedside with JIMENEZ Kellogg from Cath lab    1435 TRANSFER - OUT REPORT:    Verbal report given to JIMENEZ Kellogg on [address].  being transferred to cath lab for ordered procedure       Report consisted of patients Situation, Background, Assessment and   Recommendations(SBAR). Information from the following report(s) Pre Procedure Checklist was reviewed with the receiving nurse. Lines:   Peripheral IV 07/05/19 Right Antecubital (Active)        Opportunity for questions and clarification was provided. Patient transported with:   Tech    1005 report received from [address] - [place] REPORT:    [facility]  being received from cath lab for routine post - op      Report consisted of patients Situation, Background, Assessment and   Recommendations(SBAR). Information from the following report(s) SBAR was reviewed with the receiving nurse. Opportunity for questions and clarification was provided. Assessment completed upon patients arrival to unit and care assumed. Pt placed on monitor and site assessed. Left groin dressing clean dry and intact no bleeding or hematoma noted. Pt without c/o and no acute distress noted at this time. 1028 pt resting with eyes closed snoring opens eyes spontaneously no acute distress noted no c/o verbalized dressing CDI attempted to notify family of pt's condition family not in waiting area will re-attempt at later time.     1109 pt alert and awake family at bedside pt eating sandwich and tolerating po fluids.  No acute distress noted no c/o verbalized will  Cont to monitor    1228 pt allowed to get dressed PIV removed and pt escorted to parking lot without incident

## 2019-07-18 ENCOUNTER — OFFICE VISIT (OUTPATIENT)
Dept: VASCULAR SURGERY | Age: 64
End: 2019-07-18

## 2019-07-18 VITALS
DIASTOLIC BLOOD PRESSURE: 82 MMHG | SYSTOLIC BLOOD PRESSURE: 122 MMHG | RESPIRATION RATE: 17 BRPM | HEART RATE: 75 BPM | WEIGHT: 182 LBS | BODY MASS INDEX: 24.12 KG/M2 | HEIGHT: 73 IN

## 2019-07-18 DIAGNOSIS — I70.221 ATHEROSCLEROSIS OF NATIVE ARTERY OF RIGHT LOWER EXTREMITY WITH REST PAIN (HCC): Primary | ICD-10-CM

## 2019-07-18 DIAGNOSIS — M06.9 RHEUMATOID ARTHRITIS INVOLVING MULTIPLE SITES, UNSPECIFIED RHEUMATOID FACTOR PRESENCE: ICD-10-CM

## 2019-07-18 DIAGNOSIS — I70.209 SUPERFICIAL FEMORAL ARTERY OCCLUSION (HCC): ICD-10-CM

## 2019-07-18 DIAGNOSIS — I70.201 POPLITEAL ARTERY OCCLUSION, RIGHT (HCC): ICD-10-CM

## 2019-07-18 DIAGNOSIS — M19.90 ARTHRITIS: ICD-10-CM

## 2019-07-18 NOTE — LETTER
7/18/19 Patient: Dave Al Jr. YOB: 1955 Date of Visit: 7/18/2019 MD Ángel Guzman [address] [place] [id number] VIA Facsimile: [phone] Dear Sunni Garcia MD, Thank you for referring Mr. Devaughn Frankel to [facility] for evaluation. My notes for this consultation are attached. If you have questions, please do not hesitate to call me. I look forward to following your patient along with you. Sincerely, [address] [place] [address]

## 2019-07-18 NOTE — PROGRESS NOTES
Dave Al Jr. Chief Complaint   Patient presents with    Leg Pain        History and Physical    Dave Selby is a 58 y.o. male with bilateral lower extremity claudication who presents today in follow up. He is status post right common femoral endarterectomy with remote endarterectomy of the right superficial femoral artery and above knee popliteal artery and transluminal intravascular placement of stent within the above knee popliteal artery and superficial femoral artery, 7 x 150 Viabahn stents x 2 in October of 2018. He is also status post left lower extremity angiogram with SFA and popliteal atherectomy and balloon angioplasty in August of 2018. At his last visit he was complaining of right leg rest pain and worsening claudication. He had had a neck surgery and was taken off of his antiplatelet medications for a week. Studies showed \"critical arterial insufficiency notable right lower extremity. Mid to distal superficial femoral artery and popliteal artery appear occluded right lower extremity. No evidence of arterial insufficiency left lower extremity. Left ankle-brachial index is normal with triphasic waveforms\". He therefore underwent right leg angiogram and presents today in follow. Unfortunately his symptoms are unchanged. He is understanding that no intervention was performed and that this was a diagnostic study. Angiogram findings were discussed with him as follows.     Angio Findings:   Operative findings:   #1 aorta is patent without any evidence of stenosis  #2 bilateral renal arteries are patent without stenosis  #3 bilateral common iliac arteries are patent without stenosis  #4 bilateral internal iliac arteries are patent without stenosis  #5 bilateral external iliac arteries are patent without stenosis     Right lower extremity  #1 common femoral artery is patent without any evidence of stenosis is large secondary to previous patch angioplasty  #2 profunda femoris artery is patent without any evidence of stenosis  #3 superficial femoral artery is flush occluded  #4 above-knee popliteal artery is occluded  #5 below-knee popliteal artery is patent without any evidence of stenosis  #6 anterior tibial artery is patent proximally but then appears to be occluded  #7 tibioperoneal trunk artery is patent without stenosis  #8 posterior tibial artery is patent without stenosis and is a dominant vessel to the foot  #9 peroneal artery is patent without stenosis although a small diminutive in size      Past Medical History:   Diagnosis Date    Arthritis     CAD (coronary artery disease)     Cancer of bone ([address]     COPD     Heart failure ([address]     stents x2 in 2008    History of chemotherapy     History of radiation therapy     Hypertension     Non Hodgkin's lymphoma ([address] 2013    had chemo and rad     Past Surgical History:   Procedure Laterality Date    CARDIAC SURG PROCEDURE UNLIST      Stented x2- 2008    COLONOSCOPY N/A 8/15/2016    COLONOSCOPY with polypectomy, biopsy and clip performed by Maikel Lora MD at [facility] ENDOSCOPY    HX [address]  6-5-13    HX HEART CATHETERIZATION  6-5-13    HX HEENT      Deviated septum,     HX OTHER SURGICAL      skin cancer excision face    HX VASCULAR STENT      right leg x 2 October 2018 (Dr Nataliia Alex vascular surgeon)    REPAIR ING HERNIA,5+Y/O,REDUCIBL Right 12/05/2017    Dr. Lisa Taylor     Patient Active Problem List   Diagnosis Code    Sepsis(995.91) A41.9    Immunocompromised state ([address] D84.9    Anemia, unspecified D64.9    Bone cancer ([address] C41.9    PAD (peripheral artery disease) ([address] I73.9    Aortic ectasia, abdominal ([address] I77.811    Iliac artery aneurysm, left ([address] I72.3    Right inguinal hernia K40.90    Atherosclerosis of native artery of both lower extremities with intermittent claudication (HCC) I70.213     Current Outpatient Medications   Medication Sig Dispense Refill    cyclobenzaprine (FLEXERIL) 5 mg tablet Take 2 Tabs by mouth three (3) times daily (with meals). 9 Tab 0    cyclobenzaprine (FLEXERIL) 5 mg tablet Take 2 Tabs by mouth three (3) times daily (with meals). 9 Tab 0    chlorproMAZINE (THORAZINE) 10 mg tablet Take 2 Tabs by mouth every six (6) hours as needed. 10 Tab 0    clopidogrel (PLAVIX) 75 mg tab Take 1 Tab by mouth daily. 30 Tab 3    oxyCODONE-acetaminophen (PERCOCET) 5-325 mg per tablet Take 1 Tab by mouth every four (4) hours as needed. Max Daily Amount: 6 Tabs. 30 Tab 0    hydroxychloroquine (PLAQUENIL) 200 mg tablet Take 200 mg by mouth daily.  leflunomide (ARAVA) 20 mg tablet Take 20 mg by mouth daily.  predniSONE (DELTASONE) 5 mg tablet Take  by mouth daily. Take 1 to 3 tabs daily      albuterol (PROVENTIL HFA, VENTOLIN HFA, PROAIR HFA) 90 mcg/actuation inhaler Take 2 Puffs by inhalation every four (4) hours as needed for Wheezing or Shortness of Breath. Indications: BRONCHOSPASM PREVENTION, Chronic Obstructive Pulmonary Disease 1 Inhaler 0    atorvastatin (LIPITOR) 20 mg tablet Take  by mouth daily.  cholecalciferol, VITAMIN D3, (VITAMIN D3) 5,000 unit tab tablet Take 5,000 Units by mouth daily.  aspirin (ASPIRIN) 325 mg tablet Take 325 mg by mouth daily.  esomeprazole (NEXIUM) 20 mg capsule Take 20 mg by mouth daily. No Known Allergies    Physical Exam:    Visit Vitals  /72 (BP 1 Location: Left arm, BP Patient Position: Sitting)   Pulse 92   Resp 17   Ht 6' (1.829 m)   Wt 192 lb (87.1 kg)   BMI 26.04 kg/m²      General: Well-appearing male in no acute distress   HEENT: EOMI, no scleral icterus is noted. Left neck skin nodule  Pulmonary: No increased work or breathing is noted. Abdomen: nondistended. Extremities: Warm and well perfused bilaterally. He does have arthritic deformities in both hands. Pt has no BLE edema.    Neuro: Cranial nerves II through XII are grossly intact   Integument: No ulcerations are identified visibly      Impression and Plan:  Jeffrey Eddy is a 58 y.o. male with right lower extremity claudication and rest pain. He presents today after recent right leg angiogram.  Angiogram findings were discussed with him as above. I discussed that he has been recommended for a right common femoral artery to below-knee popliteal artery bypass. Patient expresses understanding and due to his rest pain would like to move forward however he states that he would prefer to wait until August due to some upcoming family events. We will obtain vein mapping of the lower extremities and I will call him with these results. Further surgical recommendations pending ultrasound results. We will schedule him for his surgery at the time I call him for his results. Risk versus benefits of the procedure were discussed with him. All questions answered. Patient expresses understanding and agrees to the plan. Al Hernandez  [phone]        PLEASE NOTE:  This document has been produced using voice recognition software. Unrecognized errors in transcription may be present.

## 2019-07-18 NOTE — PROGRESS NOTES
1. Have you been to an emergency room or urgent care clinic since your last visit? NO    Hospitalized since your last visit? If yes, where, when, and reason for visit? NO  2. Have you seen or consulted any other health care providers outside of the [facility] since your last visit including any procedures, health maintenance items. If yes, where, when and reason for visit?  NO

## 2019-08-07 RX ORDER — CLOPIDOGREL BISULFATE 75 MG/1
75 TABLET ORAL DAILY
Qty: 90 TAB | Refills: 3 | Status: SHIPPED | OUTPATIENT
Start: 2019-08-07 | End: 2022-10-26

## 2019-08-12 ENCOUNTER — OFFICE VISIT (OUTPATIENT)
Dept: VASCULAR SURGERY | Age: 64
End: 2019-08-12

## 2019-08-12 VITALS
DIASTOLIC BLOOD PRESSURE: 70 MMHG | BODY MASS INDEX: 24.12 KG/M2 | RESPIRATION RATE: 18 BRPM | HEART RATE: 72 BPM | WEIGHT: 182 LBS | HEIGHT: 73 IN | SYSTOLIC BLOOD PRESSURE: 126 MMHG

## 2019-08-12 DIAGNOSIS — I73.9 PAD (PERIPHERAL ARTERY DISEASE) (HCC): ICD-10-CM

## 2019-08-12 DIAGNOSIS — I72.3 ILIAC ARTERY ANEURYSM, LEFT (HCC): ICD-10-CM

## 2019-08-12 DIAGNOSIS — I77.811 AORTIC ECTASIA, ABDOMINAL (HCC): ICD-10-CM

## 2019-08-12 DIAGNOSIS — I70.221 ATHEROSCLEROSIS OF NATIVE ARTERY OF RIGHT LOWER EXTREMITY WITH REST PAIN (HCC): Primary | ICD-10-CM

## 2019-08-12 DIAGNOSIS — I70.213 ATHEROSCLEROSIS OF NATIVE ARTERY OF BOTH LOWER EXTREMITIES WITH INTERMITTENT CLAUDICATION (HCC): ICD-10-CM

## 2019-08-12 NOTE — LETTER
8/12/19 Patient: Dave Al Jr. YOB: 1955 Date of Visit: 8/12/2019 MD Ángel Parksibaldi [address] [place] [id number] VIA Facsimile: [phone] Dear Matt Castanon MD, Thank you for referring Mr. Tammy Osborne to [facility] for evaluation. My notes for this consultation are attached. If you have questions, please do not hesitate to call me. I look forward to following your patient along with you. Sincerely, Magalie Hicks MD

## 2019-08-12 NOTE — PROGRESS NOTES
Dave PURCELL Servando Tolbert. Chief Complaint   Patient presents with    Leg Pain       History and Physical    Dave Austin. is a 71 y.o. male with ischemic rest pain of right lower extremity. No fevers or chills. No ulcerations. Past Medical History:   Diagnosis Date    Arthritis     CAD (coronary artery disease)     Cancer of bone ([address]     COPD     Heart failure ([address]     stents x2 in 2008    History of chemotherapy     History of radiation therapy     Hypertension     Non Hodgkin's lymphoma ([address] 2013    had chemo and rad    PAD (peripheral artery disease) ([address]      Past Surgical History:   Procedure Laterality Date    CARDIAC SURG PROCEDURE UNLIST      Stented x2- 2008    COLONOSCOPY N/A 8/15/2016    COLONOSCOPY with polypectomy, biopsy and clip performed by Jan Perales MD at [address]   6-5-13    HX HEART CATHETERIZATION  6-5-13    HX HEENT      Deviated septum,     HX OTHER SURGICAL      skin cancer excision face    HX VASCULAR STENT      right leg x 2 October 2018 (Dr Seamus Butler vascular surgeon)    REPAIR ING HERNIA,5+Y/O,REDUCIBL Right 12/05/2017    Dr. Lan Russo     Patient Active Problem List   Diagnosis Code    Sepsis(995.91) A41.9    Immunocompromised state ([address] D84.9    Anemia, unspecified D64.9    Bone cancer ([address] C41.9    PAD (peripheral artery disease) ([address] I73.9    Aortic ectasia, abdominal ([address] I77.811    Iliac artery aneurysm, left ([address] I72.3    Right inguinal hernia K40.90    Atherosclerosis of native artery of both lower extremities with intermittent claudication (HCC) I70.213     Current Outpatient Medications   Medication Sig Dispense Refill    clopidogrel (PLAVIX) 75 mg tab Take 1 Tab by mouth daily. 90 Tab 3    aspirin (ASPIRIN) 325 mg tablet Take 325 mg by mouth daily.  acetaminophen-codeine (TYLENOL-CODEINE #3) 300-30 mg per tablet Take 1 Tab by mouth every four (4) hours as needed for Pain.  Max Daily Amount: 6 Tabs. 12 Tab 0    cyclobenzaprine (FLEXERIL) 5 mg tablet Take 2 Tabs by mouth three (3) times daily (with meals). 9 Tab 0    cyclobenzaprine (FLEXERIL) 5 mg tablet Take 2 Tabs by mouth three (3) times daily (with meals). 9 Tab 0    chlorproMAZINE (THORAZINE) 10 mg tablet Take 2 Tabs by mouth every six (6) hours as needed. 10 Tab 0    hydroxychloroquine (PLAQUENIL) 200 mg tablet Take 200 mg by mouth daily.  leflunomide (ARAVA) 20 mg tablet Take 20 mg by mouth daily.  predniSONE (DELTASONE) 5 mg tablet Take  by mouth daily. Take 1 to 3 tabs daily      albuterol (PROVENTIL HFA, VENTOLIN HFA, PROAIR HFA) 90 mcg/actuation inhaler Take 2 Puffs by inhalation every four (4) hours as needed for Wheezing or Shortness of Breath. Indications: BRONCHOSPASM PREVENTION, Chronic Obstructive Pulmonary Disease 1 Inhaler 0    atorvastatin (LIPITOR) 20 mg tablet Take  by mouth daily.  cholecalciferol, VITAMIN D3, (VITAMIN D3) 5,000 unit tab tablet Take 5,000 Units by mouth daily.  esomeprazole (NEXIUM) 20 mg capsule Take 20 mg by mouth daily.        No Known Allergies  Social History     Socioeconomic History    Marital status:      Spouse name: Not on file    Number of children: Not on file    Years of education: Not on file    Highest education level: Not on file   Occupational History    Not on file   Social Needs    Financial resource strain: Not on file    Food insecurity:     Worry: Not on file     Inability: Not on file    Transportation needs:     Medical: Not on file     Non-medical: Not on file   Tobacco Use    Smoking status: Former Smoker     Packs/day: 2.00     Years: 39.00     Pack years: 78.00     Last attempt to quit: 8/10/2012     Years since quittin.0    Smokeless tobacco: Never Used   Substance and Sexual Activity    Alcohol use: No    Drug use: No    Sexual activity: Never   Lifestyle    Physical activity:     Days per week: Not on file     Minutes per session: Not on file    Stress: Not on file   Relationships    Social connections:     Talks on phone: Not on file     Gets together: Not on file     Attends Synagogue service: Not on file     Active member of club or organization: Not on file     Attends meetings of clubs or organizations: Not on file     Relationship status: Not on file    Intimate partner violence:     Fear of current or ex partner: Not on file     Emotionally abused: Not on file     Physically abused: Not on file     Forced sexual activity: Not on file   Other Topics Concern    Not on file   Social History Narrative    Not on file      Family History   Problem Relation Age of Onset    Stroke Mother     Heart Disease Father        Physical Exam:    Visit Vitals  /70 (BP 1 Location: Left arm, BP Patient Position: Sitting)   Pulse 72   Resp 18   Ht 6' 1\" (1.854 m)   Wt 182 lb (82.6 kg)   BMI 24.01 kg/m²      General: Well-appearing male in no acute distress  HEENT: EOMI no scleral icterus is noted  Pulmonary: No increased work of breathing is noted  Extremities: Warm and perfused nonpalpable distal pulses of the right lower extremity with no visible ulcerations or edema  Neuro: Cranial nerves II through XII are grossly intact within normal gait    Impression and Plan:  Vivian Butler. is a 71 y.o. male with ischemic rest pain of the right lower extremity. I reviewed his vein mapping in clinic today showing that he is a great candidate for right femoral to below-knee popliteal artery bypass using greater saphenous vein. We talked about the risk benefits of the surgery including but not limited to bleeding, infection, damage to adjacent structures, MI, stroke, death, loss of lower extremity, need for further surgery. Patient is understanding all the risks and is willing to move forward with the surgery.   He wants to get this scheduled with his calendar and he does not have that with him today so we will give him a call and get him on the books for a time that works best for him. We reviewed the plan with the patient and the patient understands. We also gave the patient appropriate instructions on their disease process and when to call back. Greater than 50% of this visit was spent with face to face discussion. Magalie Hicks MD    PLEASE NOTE:  This document has been produced using voice recognition software. Unrecognized errors in transcription may be present.

## 2019-09-05 ENCOUNTER — HOSPITAL ENCOUNTER (OUTPATIENT)
Dept: PET IMAGING | Age: 64
Discharge: HOME | End: 2019-09-05
Attending: INTERNAL MEDICINE
Payer: MEDICARE

## 2019-09-05 DIAGNOSIS — C83.38 RETICULOSARCOMA OF LYMPH NODES OF MULTIPLE SITES (HCC): ICD-10-CM

## 2019-09-05 PROCEDURE — A9552 F18 FDG: HCPCS

## 2019-10-08 ENCOUNTER — HOSPITAL ENCOUNTER (OUTPATIENT)
Dept: GENERAL RADIOLOGY | Age: 64
Discharge: HOME | End: 2019-10-08
Payer: MEDICARE

## 2019-10-08 DIAGNOSIS — M06.09 RHEUMATOID ARTHRITIS OF MULTIPLE SITES WITHOUT RHEUMATOID FACTOR (HCC): ICD-10-CM

## 2019-10-08 PROCEDURE — 72040 X-RAY EXAM NECK SPINE 2-3 VW: CPT

## 2019-12-06 ENCOUNTER — ANESTHESIA EVENT (OUTPATIENT)
Dept: ENDOSCOPY | Age: 64
End: 2019-12-06
Payer: MEDICARE

## 2019-12-09 ENCOUNTER — ANESTHESIA (OUTPATIENT)
Dept: ENDOSCOPY | Age: 64
End: 2019-12-09
Payer: MEDICARE

## 2019-12-09 ENCOUNTER — HOSPITAL ENCOUNTER (OUTPATIENT)
Age: 64
Setting detail: OUTPATIENT SURGERY
Discharge: HOME | End: 2019-12-09
Attending: INTERNAL MEDICINE | Admitting: INTERNAL MEDICINE
Payer: MEDICARE

## 2019-12-09 VITALS
OXYGEN SATURATION: 98 % | BODY MASS INDEX: 24.38 KG/M2 | DIASTOLIC BLOOD PRESSURE: 71 MMHG | HEIGHT: 72 IN | RESPIRATION RATE: 14 BRPM | WEIGHT: 180 LBS | HEART RATE: 55 BPM | TEMPERATURE: 96.8 F | SYSTOLIC BLOOD PRESSURE: 126 MMHG

## 2019-12-09 PROCEDURE — 77030008565 HC TBNG SUC IRR ERBE -B: Performed by: INTERNAL MEDICINE

## 2019-12-09 PROCEDURE — 76040000007: Performed by: INTERNAL MEDICINE

## 2019-12-09 PROCEDURE — 74011250636 HC RX REV CODE- 250/636: Performed by: NURSE ANESTHETIST, CERTIFIED REGISTERED

## 2019-12-09 PROCEDURE — 77030029384 HC SNR POLYP CAPTVR II BSC -B: Performed by: INTERNAL MEDICINE

## 2019-12-09 PROCEDURE — 74011000250 HC RX REV CODE- 250: Performed by: NURSE ANESTHETIST, CERTIFIED REGISTERED

## 2019-12-09 PROCEDURE — 77030021593 HC FCPS BIOP ENDOSC BSC -A: Performed by: INTERNAL MEDICINE

## 2019-12-09 PROCEDURE — 76060000032 HC ANESTHESIA 0.5 TO 1 HR: Performed by: INTERNAL MEDICINE

## 2019-12-09 PROCEDURE — 77030018846 HC SOL IRR STRL H20 ICUM -A: Performed by: INTERNAL MEDICINE

## 2019-12-09 PROCEDURE — 74011250637 HC RX REV CODE- 250/637: Performed by: NURSE ANESTHETIST, CERTIFIED REGISTERED

## 2019-12-09 PROCEDURE — 88305 TISSUE EXAM BY PATHOLOGIST: CPT

## 2019-12-09 PROCEDURE — 77030013992 HC SNR POLYP ENDOSC BSC -B: Performed by: INTERNAL MEDICINE

## 2019-12-09 RX ORDER — FLUMAZENIL 0.1 MG/ML
0.2 INJECTION INTRAVENOUS
Status: CANCELLED | OUTPATIENT
Start: 2019-12-09 | End: 2019-12-09

## 2019-12-09 RX ORDER — SODIUM CHLORIDE, SODIUM LACTATE, POTASSIUM CHLORIDE, CALCIUM CHLORIDE 600; 310; 30; 20 MG/100ML; MG/100ML; MG/100ML; MG/100ML
75 INJECTION, SOLUTION INTRAVENOUS CONTINUOUS
Status: DISCONTINUED | OUTPATIENT
Start: 2019-12-09 | End: 2019-12-09 | Stop reason: HOSPADM

## 2019-12-09 RX ORDER — SODIUM CHLORIDE 0.9 % (FLUSH) 0.9 %
5-40 SYRINGE (ML) INJECTION EVERY 8 HOURS
Status: DISCONTINUED | OUTPATIENT
Start: 2019-12-09 | End: 2019-12-09 | Stop reason: HOSPADM

## 2019-12-09 RX ORDER — NALOXONE HYDROCHLORIDE 0.4 MG/ML
0.4 INJECTION, SOLUTION INTRAMUSCULAR; INTRAVENOUS; SUBCUTANEOUS
Status: CANCELLED | OUTPATIENT
Start: 2019-12-09 | End: 2019-12-09

## 2019-12-09 RX ORDER — SODIUM CHLORIDE 0.9 % (FLUSH) 0.9 %
5-40 SYRINGE (ML) INJECTION AS NEEDED
Status: DISCONTINUED | OUTPATIENT
Start: 2019-12-09 | End: 2019-12-09 | Stop reason: HOSPADM

## 2019-12-09 RX ORDER — EPINEPHRINE 0.1 MG/ML
1 INJECTION INTRACARDIAC; INTRAVENOUS
Status: CANCELLED | OUTPATIENT
Start: 2019-12-09 | End: 2019-12-10

## 2019-12-09 RX ORDER — LIDOCAINE HYDROCHLORIDE 10 MG/ML
0.1 INJECTION, SOLUTION EPIDURAL; INFILTRATION; INTRACAUDAL; PERINEURAL AS NEEDED
Status: DISCONTINUED | OUTPATIENT
Start: 2019-12-09 | End: 2019-12-09 | Stop reason: HOSPADM

## 2019-12-09 RX ORDER — SODIUM CHLORIDE, SODIUM LACTATE, POTASSIUM CHLORIDE, CALCIUM CHLORIDE 600; 310; 30; 20 MG/100ML; MG/100ML; MG/100ML; MG/100ML
50 INJECTION, SOLUTION INTRAVENOUS CONTINUOUS
Status: DISCONTINUED | OUTPATIENT
Start: 2019-12-09 | End: 2019-12-09 | Stop reason: HOSPADM

## 2019-12-09 RX ORDER — SODIUM CHLORIDE 0.9 % (FLUSH) 0.9 %
5-40 SYRINGE (ML) INJECTION EVERY 8 HOURS
Status: CANCELLED | OUTPATIENT
Start: 2019-12-09

## 2019-12-09 RX ORDER — DEXTROMETHORPHAN/PSEUDOEPHED 2.5-7.5/.8
1.2 DROPS ORAL
Status: CANCELLED | OUTPATIENT
Start: 2019-12-09

## 2019-12-09 RX ORDER — SODIUM CHLORIDE 0.9 % (FLUSH) 0.9 %
5-40 SYRINGE (ML) INJECTION AS NEEDED
Status: CANCELLED | OUTPATIENT
Start: 2019-12-09

## 2019-12-09 RX ORDER — FAMOTIDINE 20 MG/1
20 TABLET, FILM COATED ORAL ONCE
Status: COMPLETED | OUTPATIENT
Start: 2019-12-09 | End: 2019-12-09

## 2019-12-09 RX ORDER — PROPOFOL 10 MG/ML
INJECTION, EMULSION INTRAVENOUS AS NEEDED
Status: DISCONTINUED | OUTPATIENT
Start: 2019-12-09 | End: 2019-12-09 | Stop reason: HOSPADM

## 2019-12-09 RX ORDER — ATROPINE SULFATE 0.1 MG/ML
0.5 INJECTION INTRAVENOUS
Status: CANCELLED | OUTPATIENT
Start: 2019-12-09 | End: 2019-12-10

## 2019-12-09 RX ORDER — LIDOCAINE HYDROCHLORIDE 20 MG/ML
INJECTION, SOLUTION EPIDURAL; INFILTRATION; INTRACAUDAL; PERINEURAL AS NEEDED
Status: DISCONTINUED | OUTPATIENT
Start: 2019-12-09 | End: 2019-12-09 | Stop reason: HOSPADM

## 2019-12-09 RX ADMIN — PROPOFOL 50 MG: 10 INJECTION, EMULSION INTRAVENOUS at 11:40

## 2019-12-09 RX ADMIN — PROPOFOL 50 MG: 10 INJECTION, EMULSION INTRAVENOUS at 11:24

## 2019-12-09 RX ADMIN — LIDOCAINE HYDROCHLORIDE 100 MG: 20 INJECTION, SOLUTION EPIDURAL; INFILTRATION; INTRACAUDAL; PERINEURAL at 11:12

## 2019-12-09 RX ADMIN — PROPOFOL 50 MG: 10 INJECTION, EMULSION INTRAVENOUS at 11:35

## 2019-12-09 RX ADMIN — PROPOFOL 50 MG: 10 INJECTION, EMULSION INTRAVENOUS at 11:20

## 2019-12-09 RX ADMIN — PROPOFOL 70 MG: 10 INJECTION, EMULSION INTRAVENOUS at 11:12

## 2019-12-09 RX ADMIN — SODIUM CHLORIDE, SODIUM LACTATE, POTASSIUM CHLORIDE, AND CALCIUM CHLORIDE 75 ML/HR: 600; 310; 30; 20 INJECTION, SOLUTION INTRAVENOUS at 09:32

## 2019-12-09 RX ADMIN — FAMOTIDINE 20 MG: 20 TABLET ORAL at 09:32

## 2019-12-09 RX ADMIN — PROPOFOL 50 MG: 10 INJECTION, EMULSION INTRAVENOUS at 11:16

## 2019-12-09 RX ADMIN — PROPOFOL 50 MG: 10 INJECTION, EMULSION INTRAVENOUS at 11:30

## 2019-12-09 NOTE — ANESTHESIA POSTPROCEDURE EVALUATION
Procedure(s):  COLONOSCOPY with polypectomy. MAC    Anesthesia Post Evaluation      Multimodal analgesia: multimodal analgesia used between 6 hours prior to anesthesia start to PACU discharge  Patient location during evaluation: bedside  Patient participation: complete - patient participated  Level of consciousness: awake  Pain management: adequate  Airway patency: patent  Anesthetic complications: no  Cardiovascular status: stable  Respiratory status: acceptable  Hydration status: acceptable  Post anesthesia nausea and vomiting:  controlled      No vitals data found for the desired time range.

## 2019-12-09 NOTE — PROCEDURES
[email or web address] [address]  [place], [address]      Brief Procedure Note    Renée Keys  1955  [id number]    Date of Procedure: 12/9/2019    Preoperative diagnosis: 578.1 - K92.1,  Melena  530.81 - K21.9,   GERD  787.20 - R13.10,  Dysphagia  V12.72 - Z86.010,  Personal history of colonic polyps  536.8 - K30,  Indigestion    Postoperative diagnosis: ascending polyp x2, transverse polyp, sigmoid polyp, cecal polyp, sigmoid diverticulosis; internal hemorrhoids    Type of Anesthesia: MAC (Monitored anesthesia care)    Description of findings: same as post op dx    Procedure: Procedure(s):  COLONOSCOPY with polypectomy    :  Dr. Arvin Banegas MD    Assistant(s): Endoscopy Technician-1: Eros Cam RN-1: Gilford Hasten, RN; Denise Rangel    EBL:None    Specimens:   ID Type Source Tests Collected by Time Destination   1 : transverse polyp Preservative Colon, Mervat Brown MD 12/9/2019 1119 Pathology   2 : ascending polyp  Preservative Colon, Ascending  Delta Bowie MD 12/9/2019 1122 Pathology   3 : cecal polyp Preservative Cecum  Delta Bowie MD 12/9/2019 1126 Pathology   4 : sigmoid polyp Preservative Sigmoid  Delta Bowei MD 12/9/2019 1143 Pathology       Findings: See printed and scanned procedure note    Complications: None    Dr. Arvin Banegas MD  12/9/2019  11:52 AM

## 2019-12-09 NOTE — ANESTHESIA PREPROCEDURE EVALUATION
Relevant Problems   No relevant active problems       Anesthetic History   No history of anesthetic complications            Review of Systems / Medical History  Patient summary reviewed and pertinent labs reviewed    Pulmonary    COPD: mild               Neuro/Psych   Within defined limits           Cardiovascular    Hypertension          CAD, PAD and cardiac stents (2008)    Exercise tolerance: >4 METS     GI/Hepatic/Renal  Within defined limits              Endo/Other  Within defined limits           Other Findings   Comments:                  Physical Exam    Airway  Mallampati: II  TM Distance: 4 - 6 cm  Neck ROM: normal range of motion   Mouth opening: Normal     Cardiovascular  Regular rate and rhythm,  S1 and S2 normal,  no murmur, click, rub, or gallop  Rhythm: regular  Rate: normal         Dental    Dentition: Full upper dentures     Pulmonary  Breath sounds clear to auscultation               Abdominal  GI exam deferred       Other Findings            Anesthetic Plan    ASA: 3  Anesthesia type: MAC          Induction: Intravenous  Anesthetic plan and risks discussed with: Patient

## 2019-12-09 NOTE — H&P
[email or web address]  [phone]    GASTROENTEROLOGY Pre-Procedure H and P      Impression/Plan:   1. This patient is consented for a colonoscopy for personal h/o colon polyps. Chief Complaint: personal h/o colon polyps. HPI:  Rey Serrano is a 71 y.o. male who presents for a colonoscopy for personal h/o colon polyps.       PMH:   Past Medical History:   Diagnosis Date    Arthritis     CAD (coronary artery disease)     [facility] ([facility]     COPD     Heart failure ([facility]     stents x2 in     History of chemotherapy     History of radiation therapy     Hypertension     no meds now    Non Hodgkin's lymphoma ([facility] [facility] 2013    had chemo and rad    PAD (peripheral artery disease) (HCC)        PSH:   Past Surgical History:   Procedure Laterality Date    CARDIAC SURG PROCEDURE UNLIST      Stented x2-     COLONOSCOPY N/A 8/15/2016    COLONOSCOPY with polypectomy, biopsy and clip performed by Wardell Riedel, MD at [facility]    HX [address]  6-5-13    HX HEART CATHETERIZATION  --    HX HEENT      Deviated septum,     HX OTHER SURGICAL      skin cancer excision face    HX VASCULAR STENT      right leg x 2018 (Dr Jen Leyden vascular surgeon)   Brandie Christian ING HERNIA,5+Y/O,REDUCIBL Right 2017    Dr. Lesa Tamez HX:   Social History     Socioeconomic History    Marital status:      Spouse name: Not on file    Number of children: Not on file    Years of education: Not on file    Highest education level: Not on file   Occupational History    Not on file   Social Needs    Financial resource strain: Not on file    Food insecurity:     Worry: Not on file     Inability: Not on file    Transportation needs:     Medical: Not on file     Non-medical: Not on file   Tobacco Use    Smoking status: Former Smoker     Packs/day: 2.00     Years: 39.00     Pack years: 78.00     Last attempt to quit: 8/10/2012     Years since quittin.3    Smokeless tobacco: Never Used   Substance and Sexual Activity    Alcohol use: No    Drug use: No    Sexual activity: Never   Lifestyle    Physical activity:     Days per week: Not on file     Minutes per session: Not on file    Stress: Not on file   Relationships    Social connections:     Talks on phone: Not on file     Gets together: Not on file     Attends [employer] service: Not on file     Active member of club or organization: Not on file     Attends meetings of clubs or organizations: Not on file     Relationship status: Not on file    Intimate partner violence:     Fear of current or ex partner: Not on file     Emotionally abused: Not on file     Physically abused: Not on file     Forced sexual activity: Not on file   Other Topics Concern    Not on file   Social History Narrative    Not on file       FHX:   Family History   Problem Relation Age of Onset    Stroke Mother     Heart Disease Father        Allergy:   No Known Allergies    Home Medications:     Medications Prior to Admission   Medication Sig    leflunomide (ARAVA) 20 mg tablet Take 20 mg by mouth daily.  albuterol (PROVENTIL HFA, VENTOLIN HFA, PROAIR HFA) 90 mcg/actuation inhaler Take 2 Puffs by inhalation every four (4) hours as needed for Wheezing or Shortness of Breath. Indications: BRONCHOSPASM PREVENTION, Chronic Obstructive Pulmonary Disease    cholecalciferol, VITAMIN D3, (VITAMIN D3) 5,000 unit tab tablet Take 5,000 Units by mouth daily.  esomeprazole (NEXIUM) 20 mg capsule Take 20 mg by mouth daily.  clopidogrel (PLAVIX) 75 mg tab Take 1 Tab by mouth daily.  aspirin (ASPIRIN) 325 mg tablet Take 325 mg by mouth daily.  acetaminophen-codeine (TYLENOL-CODEINE #3) 300-30 mg per tablet Take 1 Tab by mouth every four (4) hours as needed for Pain. Max Daily Amount: 6 Tabs.  cyclobenzaprine (FLEXERIL) 5 mg tablet Take 2 Tabs by mouth three (3) times daily (with meals).     cyclobenzaprine (FLEXERIL) 5 mg tablet Take 2 Tabs by mouth three (3) times daily (with meals).  chlorproMAZINE (THORAZINE) 10 mg tablet Take 2 Tabs by mouth every six (6) hours as needed.  hydroxychloroquine (PLAQUENIL) 200 mg tablet Take 200 mg by mouth daily.  predniSONE (DELTASONE) 5 mg tablet Take  by mouth daily. Take 1 to 3 tabs daily    atorvastatin (LIPITOR) 20 mg tablet Take  by mouth daily. Review of Systems:     A complete 10 point review of systems was performed and pertinents are as per the HPI. Remainder of the review of systems was negative. Visit Vitals  /84   Pulse 85   Temp 97.7 °F (36.5 °C)   Resp 18   Ht 6' (1.829 m)   Wt 81.6 kg (180 lb)   SpO2 98%   BMI 24.41 kg/m²       Physical Assessment:     General: alert, cooperative, no acute distress, appears stated age. HEENT: normocephalic, no scleral icterus, moist mucous membranes, EOMs intact, no neck masses noted. Respiratory: lungs clear to auscultation bilaterally. Cardiovascular: regular rate and rhythm, no murmurs, rubs or gallops. Abdomen: normal bowel sounds, soft, non-tender to palpation. Extremities: no lower extremity edema, no cyanosis or clubbing. Neuro: alert and oriented x 3; non-focal exam.  Skin: no rashes. Psych: normal mood and affect. Gina Hahn MD  [facility]  [email or web address]  Phone: [phone]  Pager: [phone]  [email or web address]

## 2019-12-09 NOTE — DISCHARGE INSTRUCTIONS
Learning About Diverticulosis and Diverticulitis  What are diverticulosis and diverticulitis? In diverticulosis and diverticulitis, pouches called diverticula form in the wall of the large intestine, or colon. · In diverticulosis, the pouches do not cause any pain or other symptoms. · In diverticulitis, the pouches get inflamed or infected and cause symptoms. Doctors aren't sure what causes these pouches in the colon. But they think that a low-fiber diet may play a role. Without fiber to add bulk to the stool, the colon has to work harder than normal to push the stool forward. The pressure from this may cause pouches to form in weak spots along the colon. Some people with diverticulosis get diverticulitis. But experts don't know why this happens. What are the symptoms? · In diverticulosis, most people don't have symptoms. But pouches sometimes bleed. · In diverticulitis, symptoms may last from a few hours to a week or more. They include:  ? Belly pain. This is usually in the lower left side. It is sometimes worse when you move. This is the most common symptom. ? Fever and chills. ? Bloating and gas. ? Diarrhea or constipation. ? Nausea and sometimes vomiting.  ? Not feeling like eating. How can you prevent these problems? You may be able to lower your chance of getting diverticulitis. You can do this by taking steps to prevent constipation. · Eat fruits, vegetables, beans, and whole grains every day. These foods are high in fiber. · Drink plenty of fluids (enough so that your urine is light yellow or clear like water). If you have kidney, heart, or liver disease and have to limit fluids, talk with your doctor before you increase the amount of fluids you drink. · Get at least 30 minutes of exercise on most days of the week. Walking is a good choice. You also may want to do other activities, such as running, swimming, cycling, or playing tennis or team sports.   · Take a fiber supplement, such as Citrucel or Metamucil, every day if needed. Read and follow all instructions on the label. · Schedule time each day for a bowel movement. Having a daily routine may help. Take your time and do not strain when having a bowel movement. Some people avoid nuts, seeds, berries, and popcorn. They believe that these foods might get trapped in the diverticula and cause pain. But there is no proof that these foods cause diverticulitis or make it worse. How are these problems treated? · The best way to treat diverticulosis is to avoid constipation. (See the tips above.)  · Treatment for diverticulitis includes antibiotics and often a change in your diet. You may need only liquids at first. Your doctor may suggest pain medicines for pain or belly cramps. In some cases, surgery may be needed. Follow-up care is a key part of your treatment and safety. Be sure to make and go to all appointments, and call your doctor if you are having problems. It's also a good idea to know your test results and keep a list of the medicines you take. Where can you learn more? Go to http://messi-thang.info/. Enter U873 in the search box to learn more about \"Learning About Diverticulosis and Diverticulitis. \"  Current as of: November 7, 2018  Content Version: 12.2  © 3420-5924 [employer]. Care instructions adapted under license by [employer] (which disclaims liability or warranty for this information). If you have questions about a medical condition or this instruction, always ask your healthcare professional. [place] [address] any warranty or liability for your use of this information. Colon Polyps: Care Instructions  Your Care Instructions    Colon polyps are growths in the colon or the rectum. The cause of most colon polyps is not known, and most people who get them do not have any problems. But a certain kind can turn into cancer.  For this reason, regular testing for colon polyps is important for people as they get older. It is also important for anyone who has an increased risk for colon cancer. Polyps are usually found through routine colon cancer screening tests. Although most colon polyps are not cancerous, they are usually removed and then tested for cancer. Screening for colon cancer saves lives because the cancer can usually be cured if it is caught early. If you have a polyp that is the type that can turn into cancer, you may need more tests to examine your entire colon. The doctor will remove any other polyps that he or she finds, and you will be tested more often. Follow-up care is a key part of your treatment and safety. Be sure to make and go to all appointments, and call your doctor if you are having problems. It's also a good idea to know your test results and keep a list of the medicines you take. How can you care for yourself at home? Regular exams to look for colon polyps are the best way to prevent polyps from turning into colon cancer. These can include stool tests, sigmoidoscopy, colonoscopy, and CT colonography. Talk with your doctor about a testing schedule that is right for you. To prevent polyps  There is no home treatment that can prevent colon polyps. But these steps may help lower your risk for cancer. · Stay active. Being active can help you get to and stay at a healthy weight. Try to exercise on most days of the week. Walking is a good choice. · Eat well. Choose a variety of vegetables, fruits, legumes (such as peas and beans), fish, poultry, and whole grains. · Do not smoke. If you need help quitting, talk to your doctor about stop-smoking programs and medicines. These can increase your chances of quitting for good. · If you drink alcohol, limit how much you drink. Limit alcohol to 2 drinks a day for men and 1 drink a day for women. When should you call for help?   Call your doctor now or seek immediate medical care if:    · You have severe belly pain.     · Your stools are maroon or very bloody.    Watch closely for changes in your health, and be sure to contact your doctor if:    · You have a fever.     · You have nausea or vomiting.     · You have a change in bowel habits (new constipation or diarrhea).     · Your symptoms get worse or are not improving as expected. Where can you learn more? Go to http://messi-thang.info/. Enter 95 [id number] in the search box to learn more about \"Colon Polyps: Care Instructions. \"  Current as of: December 19, 2018  Content Version: 12.2  © 7577-9300 [employer]. Care instructions adapted under license by [employer] (which disclaims liability or warranty for this information). If you have questions about a medical condition or this instruction, always ask your healthcare professional. [address] any warranty or liability for your use of this information. DISCHARGE SUMMARY from Nurse    PATIENT INSTRUCTIONS:    After general anesthesia or intravenous sedation, for 24 hours or while taking prescription Narcotics:  · Limit your activities  · Do not drive and operate hazardous machinery  · Do not make important personal or business decisions  · Do  not drink alcoholic beverages  · If you have not urinated within 8 hours after discharge, please contact your surgeon on call. Report the following to your surgeon:  · Excessive pain, swelling, redness or odor of or around the surgical area  · Temperature over 100.5  · Nausea and vomiting lasting longer than 4 hours or if unable to take medications  · Any signs of decreased circulation or nerve impairment to extremity: change in color, persistent  numbness, tingling, coldness or increase pain  · Any questions    *  Please give a list of your current medications to your Primary Care Provider.     *  Please update this list whenever your medications are discontinued, doses are      changed, or new medications (including over-the-counter products) are added. *  Please carry medication information at all times in case of emergency situations. These are general instructions for a healthy lifestyle:    No smoking/ No tobacco products/ Avoid exposure to second hand smoke  Surgeon General's Warning:  Quitting smoking now greatly reduces serious risk to your health. Obesity, smoking, and sedentary lifestyle greatly increases your risk for illness    A healthy diet, regular physical exercise & weight monitoring are important for maintaining a healthy lifestyle    You may be retaining fluid if you have a history of heart failure or if you experience any of the following symptoms:  Weight gain of 3 pounds or more overnight or 5 pounds in a week, increased swelling in our hands or feet or shortness of breath while lying flat in bed. Please call your doctor as soon as you notice any of these symptoms; do not wait until your next office visit. The discharge information has been reviewed with the patient. The patient verbalized understanding. Discharge medications reviewed with the patient and appropriate educational materials and side effects teaching were provided.   ___________________________________________________________________________________________________________________________________

## 2019-12-13 ENCOUNTER — HOSPITAL ENCOUNTER (OUTPATIENT)
Dept: PET IMAGING | Age: 64
Discharge: HOME | End: 2019-12-13
Attending: PHYSICIAN ASSISTANT
Payer: MEDICARE

## 2019-12-13 DIAGNOSIS — C83.38 RETICULOSARCOMA OF LYMPH NODES OF MULTIPLE SITES (HCC): ICD-10-CM

## 2019-12-13 PROCEDURE — A9552 F18 FDG: HCPCS

## 2020-01-03 ENCOUNTER — HOSPITAL ENCOUNTER (EMERGENCY)
Age: 65
Discharge: HOME | End: 2020-01-03
Attending: EMERGENCY MEDICINE
Payer: MEDICARE

## 2020-01-03 ENCOUNTER — APPOINTMENT (OUTPATIENT)
Dept: GENERAL RADIOLOGY | Age: 65
End: 2020-01-03
Attending: EMERGENCY MEDICINE
Payer: MEDICARE

## 2020-01-03 VITALS
TEMPERATURE: 97.9 F | OXYGEN SATURATION: 98 % | DIASTOLIC BLOOD PRESSURE: 64 MMHG | WEIGHT: 183 LBS | SYSTOLIC BLOOD PRESSURE: 143 MMHG | BODY MASS INDEX: 24.79 KG/M2 | HEART RATE: 59 BPM | RESPIRATION RATE: 21 BRPM | HEIGHT: 72 IN

## 2020-01-03 DIAGNOSIS — R07.9 CHEST PAIN, UNSPECIFIED TYPE: Primary | ICD-10-CM

## 2020-01-03 DIAGNOSIS — R10.13 DYSPEPSIA: ICD-10-CM

## 2020-01-03 LAB
ALBUMIN SERPL-MCNC: 3.3 G/DL (ref 3.4–5)
ALBUMIN/GLOB SERPL: 0.9 {RATIO} (ref 0.8–1.7)
ALP SERPL-CCNC: 123 U/L (ref 45–117)
ALT SERPL-CCNC: 12 U/L (ref 16–61)
ANION GAP SERPL CALC-SCNC: 6 MMOL/L (ref 3–18)
AST SERPL-CCNC: 15 U/L (ref 10–38)
ATRIAL RATE: 53 BPM
BASOPHILS # BLD: 0.1 K/UL (ref 0–0.1)
BASOPHILS NFR BLD: 1 % (ref 0–2)
BILIRUB SERPL-MCNC: 0.4 MG/DL (ref 0.2–1)
BUN SERPL-MCNC: 8 MG/DL (ref 7–18)
BUN/CREAT SERPL: 10 (ref 12–20)
CALCIUM SERPL-MCNC: 8.9 MG/DL (ref 8.5–10.1)
CALCULATED P AXIS, ECG09: 26 DEGREES
CALCULATED R AXIS, ECG10: 20 DEGREES
CALCULATED T AXIS, ECG11: 77 DEGREES
CHLORIDE SERPL-SCNC: 108 MMOL/L (ref 100–111)
CK MB CFR SERPL CALC: 1.3 % (ref 0–4)
CK MB SERPL-MCNC: 1.1 NG/ML (ref 5–25)
CK SERPL-CCNC: 82 U/L (ref 39–308)
CO2 SERPL-SCNC: 26 MMOL/L (ref 21–32)
CREAT SERPL-MCNC: 0.84 MG/DL (ref 0.6–1.3)
DIAGNOSIS, 93000: NORMAL
DIFFERENTIAL METHOD BLD: ABNORMAL
EOSINOPHIL # BLD: 0.3 K/UL (ref 0–0.4)
EOSINOPHIL NFR BLD: 5 % (ref 0–5)
ERYTHROCYTE [DISTWIDTH] IN BLOOD BY AUTOMATED COUNT: 15.3 % (ref 11.6–14.5)
GLOBULIN SER CALC-MCNC: 3.7 G/DL (ref 2–4)
GLUCOSE SERPL-MCNC: 93 MG/DL (ref 74–99)
HCT VFR BLD AUTO: 44 % (ref 36–48)
HGB BLD-MCNC: 14.4 G/DL (ref 13–16)
LYMPHOCYTES # BLD: 1.4 K/UL (ref 0.9–3.6)
LYMPHOCYTES NFR BLD: 26 % (ref 21–52)
MCH RBC QN AUTO: 27.6 PG (ref 24–34)
MCHC RBC AUTO-ENTMCNC: 32.7 G/DL (ref 31–37)
MCV RBC AUTO: 84.3 FL (ref 74–97)
MONOCYTES # BLD: 0.4 K/UL (ref 0.05–1.2)
MONOCYTES NFR BLD: 7 % (ref 3–10)
NEUTS SEG # BLD: 3.2 K/UL (ref 1.8–8)
NEUTS SEG NFR BLD: 61 % (ref 40–73)
P-R INTERVAL, ECG05: 164 MS
PLATELET # BLD AUTO: 218 K/UL (ref 135–420)
PMV BLD AUTO: 9.6 FL (ref 9.2–11.8)
POTASSIUM SERPL-SCNC: 4.1 MMOL/L (ref 3.5–5.5)
PROT SERPL-MCNC: 7 G/DL (ref 6.4–8.2)
Q-T INTERVAL, ECG07: 446 MS
QRS DURATION, ECG06: 86 MS
QTC CALCULATION (BEZET), ECG08: 418 MS
RBC # BLD AUTO: 5.22 M/UL (ref 4.7–5.5)
SODIUM SERPL-SCNC: 140 MMOL/L (ref 136–145)
TROPONIN I SERPL-MCNC: <0.02 NG/ML (ref 0–0.04)
TROPONIN I SERPL-MCNC: <0.02 NG/ML (ref 0–0.04)
VENTRICULAR RATE, ECG03: 53 BPM
WBC # BLD AUTO: 5.3 K/UL (ref 4.6–13.2)

## 2020-01-03 PROCEDURE — 93005 ELECTROCARDIOGRAM TRACING: CPT

## 2020-01-03 PROCEDURE — 82550 ASSAY OF CK (CPK): CPT

## 2020-01-03 PROCEDURE — 85025 COMPLETE CBC W/AUTO DIFF WBC: CPT

## 2020-01-03 PROCEDURE — 74011000250 HC RX REV CODE- 250: Performed by: EMERGENCY MEDICINE

## 2020-01-03 PROCEDURE — 71045 X-RAY EXAM CHEST 1 VIEW: CPT

## 2020-01-03 PROCEDURE — 74011250637 HC RX REV CODE- 250/637: Performed by: EMERGENCY MEDICINE

## 2020-01-03 PROCEDURE — 99285 EMERGENCY DEPT VISIT HI MDM: CPT

## 2020-01-03 PROCEDURE — 80053 COMPREHEN METABOLIC PANEL: CPT

## 2020-01-03 RX ORDER — LIDOCAINE HYDROCHLORIDE 20 MG/ML
15 SOLUTION OROPHARYNGEAL
Status: COMPLETED | OUTPATIENT
Start: 2020-01-03 | End: 2020-01-03

## 2020-01-03 RX ADMIN — LIDOCAINE HYDROCHLORIDE 15 ML: 20 SOLUTION ORAL; TOPICAL at 13:19

## 2020-01-03 RX ADMIN — ALUMINUM HYDROXIDE AND MAGNESIUM HYDROXIDE 30 ML: 200; 200 SUSPENSION ORAL at 13:19

## 2020-01-03 NOTE — ED PROVIDER NOTES
EMERGENCY DEPARTMENT HISTORY AND PHYSICAL EXAM    12:38 PM      Date: 1/3/2020  Patient Name: Ewelina Augustin. History of Presenting Illness     Chief Complaint   Patient presents with    Chest Pain         History Provided By: Patient    Additional History (Context): Ewelina Mccann is a 71 y.o. male with Past medical history of CAD, heart failure, cardiac stents, COPD, arthritis, GERD who presents with chief complaint of mid sternal chest pain that started about 9 AM today. He also reports some right arm discomfort that resolved. Denies any shortness of breath, dizziness, abdominal pain, back pain, vomiting, sweating, no other complaint. Patient reports that his cardiologist is Dr. Goyo Beckman. He also reports that he takes Nexium for GERD.     PCP: Roma Mckay MD        Past History     Past Medical History:  Past Medical History:   Diagnosis Date    Arthritis     CAD (coronary artery disease)     Cancer of bone ([address]     COPD     Heart failure ([address]     stents x2 in 2008    History of chemotherapy     History of radiation therapy     Hypertension     no meds now    Non Hodgkin's lymphoma ([address] 2013    had chemo and rad    P[facility]peripheral [facility]) [facility]        Past Surgical History:  Past Surgical History:   Procedure Laterality Date    CARDIAC SURG PROCEDURE UNLIST      Stented x2- 2008    COLONOSCOPY N/A 8/15/2016    COLONOSCOPY with polypectomy, biopsy and clip performed by Cindy Garcia [facility] at [facility] COLONOSCOPY N/A 12/9/2019    COLONOSCOPY with polypectomy performed by Annie Donovan MD at [address]  6-5-13    HX HEART CATHETERIZATION  6-5-13    HX HEENT      Deviated septum,     HX OTHER SURGICAL      skin cancer excision face    HX VASCULAR STENT      right leg x 2 October 2018 (Dr Megan Tan vascular surgeon)    REPAIR ING HERNIA,5+Y/O,REDUCIBL Right 12/05/2017    Dr. Yahaira Osborn       Family History:  Family History Problem Relation Age of Onset    Stroke Mother     Heart Disease Father        Social History:  Social History     Tobacco Use    Smoking status: Former Smoker     Packs/day: 2.00     Years: 39.00     Pack years: 78.00     Last attempt to quit: 8/10/2012     Years since quittin.4    Smokeless tobacco: Never Used   Substance Use Topics    Alcohol use: No    Drug use: No       Allergies:  No Known Allergies      Review of Systems       Review of Systems   Constitutional: Negative for chills and fever. HENT: Negative for congestion, rhinorrhea, sore throat and trouble swallowing. Eyes: Negative for visual disturbance. Respiratory: Negative for cough, shortness of breath and wheezing. Cardiovascular: Positive for chest pain. Negative for leg swelling. Gastrointestinal: Negative for abdominal pain, nausea and vomiting. Endocrine: Negative for polyuria. Genitourinary: Negative for difficulty urinating, dysuria and flank pain. Musculoskeletal: Negative for arthralgias and neck stiffness. Skin: Negative for rash. Neurological: Negative for dizziness, weakness, numbness and headaches. Hematological: Does not bruise/bleed easily. Psychiatric/Behavioral: Negative for confusion and dysphoric mood. All other systems reviewed and are negative.         Physical Exam     Visit Vitals  /62   Pulse (!) 51   Temp 97.9 °F (36.6 °C)   Resp 18   Ht 6' (1.829 m)   Wt 83 kg (183 lb)   SpO2 96%   BMI 24.82 kg/m²         Physical Exam      Diagnostic Study Results     Labs -  Recent Results (from the past 12 hour(s))   EKG, 12 LEAD, INITIAL    Collection Time: 20 12:32 PM   Result Value Ref Range    Ventricular Rate 53 BPM    Atrial Rate 53 BPM    P-R Interval 164 ms    QRS Duration 86 ms    Q-T Interval 446 ms    QTC Calculation (Bezet) 418 ms    Calculated P Axis 26 degrees    Calculated R Axis 20 degrees    Calculated T Axis 77 degrees    Diagnosis       Sinus bradycardia  Otherwise normal ECG  When compared with ECG of 29-MAY-2019 10:18,  No significant change was found     CBC WITH AUTOMATED DIFF    Collection Time: 01/03/20 12:45 PM   Result Value Ref Range    WBC 5.3 4.6 - 13.2 K/uL    RBC 5.22 4.70 - 5.50 M/uL    HGB 14.4 13.0 - 16.0 g/dL    HCT 44.0 36.0 - 48.0 %    MCV 84.3 74.0 - 97.0 FL    MCH 27.6 24.0 - 34.0 PG    MCHC 32.7 31.0 - 37.0 g/dL    RDW 15.3 (H) 11.6 - 14.5 %    PLATELET 868 378 - 215 K/uL    MPV 9.6 9.2 - 11.8 FL    NEUTROPHILS 61 40 - 73 %    LYMPHOCYTES 26 21 - 52 %    MONOCYTES 7 3 - 10 %    EOSINOPHILS 5 0 - 5 %    BASOPHILS 1 0 - 2 %    ABS. NEUTROPHILS 3.2 1.8 - 8.0 K/UL    ABS. LYMPHOCYTES 1.4 0.9 - 3.6 K/UL    ABS. MONOCYTES 0.4 0.05 - 1.2 K/UL    ABS. EOSINOPHILS 0.3 0.0 - 0.4 K/UL    ABS. BASOPHILS 0.1 0.0 - 0.1 K/UL    DF AUTOMATED     CARDIAC PANEL,(CK, CKMB & TROPONIN)    Collection Time: 01/03/20 12:45 PM   Result Value Ref Range    CK 82 39 - 308 U/L    CK - MB 1.1 <3.6 ng/ml    CK-MB Index 1.3 0.0 - 4.0 %    Troponin-I, QT <0.02 0.0 - 6.053 NG/ML   METABOLIC PANEL, COMPREHENSIVE    Collection Time: 01/03/20 12:45 PM   Result Value Ref Range    Sodium 140 136 - 145 mmol/L    Potassium 4.1 3.5 - 5.5 mmol/L    Chloride 108 100 - 111 mmol/L    CO2 26 21 - 32 mmol/L    Anion gap 6 3.0 - 18 mmol/L    Glucose 93 74 - 99 mg/dL    BUN 8 7.0 - 18 MG/DL    Creatinine 0.84 0.6 - 1.3 MG/DL    BUN/Creatinine ratio 10 (L) 12 - 20      GFR est AA >60 >60 ml/min/1.73m2    GFR est non-AA >60 >60 ml/min/1.73m2    Calcium 8.9 8.5 - 10.1 MG/DL    Bilirubin, total 0.4 0.2 - 1.0 MG/DL    ALT (SGPT) 12 (L) 16 - 61 U/L    AST (SGOT) 15 10 - 38 U/L    Alk.  phosphatase 123 (H) 45 - 117 U/L    Protein, total 7.0 6.4 - 8.2 g/dL    Albumin 3.3 (L) 3.4 - 5.0 g/dL    Globulin 3.7 2.0 - 4.0 g/dL    A-G Ratio 0.9 0.8 - 1.7     TROPONIN I    Collection Time: 01/03/20  2:45 PM   Result Value Ref Range    Troponin-I, QT <0.02 0.0 - 0.045 NG/ML   EKG, 12 LEAD, SUBSEQUENT    Collection Time: 01/03/20  4:01 PM   Result Value Ref Range    Ventricular Rate 55 BPM    Atrial Rate 55 BPM    P-R Interval 172 ms    QRS Duration 92 ms    Q-T Interval 460 ms    QTC Calculation (Bezet) 440 ms    Calculated P Axis 46 degrees    Calculated R Axis 34 degrees    Calculated T Axis 55 degrees    Diagnosis       Sinus bradycardia  Otherwise normal ECG  When compared with ECG of 03-JAN-2020 12:32,  No significant change was found         Radiologic Studies -   XR CHEST PORT   Final Result   IMPRESSION:       No focal consolidation, pleural effusion or pneumothorax. Mildly prominent   interstitial opacities, grossly similar to prior. Please see report for additional findings and details. Medical Decision Making   I am the first provider for this patient. I reviewed the vital signs, available nursing notes, past medical history, past surgical history, family history and social history. Vital Signs-Reviewed the patient's vital signs. EKG: Interpreted by the EP Dr. Tammy Monaco. Time Interpreted: 12:33 PM   Rate: 53   Rhythm: Sinus bradycardia   Interpretation: Normal QRS duration, normal QTC, no ST elevation no ST depressions       Records Reviewed: Nursing Notes and Old Medical Records (Time of Review: 12:38 PM)    Provider Notes (Medical Decision Making): DDX: Cardiac, dyspepsia, GERD, metabolic, skeletal, infectious    Labs, EKG, chest x-ray, GI cocktail      MDM    Medications   aluminum-magnesium hydroxide (MAALOX) oral suspension 30 mL (30 mL Oral Given 1/3/20 1319)   lidocaine (XYLOCAINE) 2 % viscous solution 15 mL (15 mL Mouth/Throat Given 1/3/20 1319)         ED Course: Progress Notes, Reevaluation, and Consults:  Initial labs reassuring    Get repeat EKG and troponin    After meds, pain improved    Repeat troponin less than 0.2, just as initial troponin.     Subsequent EKG interpreted by Dr. Carlin Dorsey at 4:03 PM: Sinus bradycardia, rate 55, normal QRS duration, normal QTC, no ST elevation, no ST depressions    I have reassessed the patient. I have discussed the workup, results and plan with the patient and patient is in agreement. Patient is feeling better. Patient was discharge in stable condition. Patient was given outpatient follow up. Patient is to return to emergency department if any new or worsening condition. Diagnosis     Clinical Impression:   1. Chest pain, unspecified type    2. Dyspepsia        Disposition: Discharged    Follow-up Information     Follow up With Specialties Details Why Contact Info    Your cardiologist  Schedule an appointment as soon as possible for a visit in 2 days      Yanet Newman MD [facility] Schedule an appointment as soon as possible for a visit in 3 days  [address] [facility]      [address] [facility] EMERGENCY DEPT Emergency Medicine  As needed, If symptoms worsen [address] [facility]  [phone]           Patient's Medications   Start Taking    No medications on file   Continue Taking    ACETAMINOPHEN-CODEINE (TYLENOL-CODEINE #3) 300-30 MG PER TABLET    Take 1 Tab by mouth every four (4) hours as needed for Pain. Max Daily Amount: 6 Tabs. ALBUTEROL (PROVENTIL HFA, VENTOLIN HFA, PROAIR HFA) 90 MCG/ACTUATION INHALER    Take 2 Puffs by inhalation every four (4) hours as needed for Wheezing or Shortness of Breath. Indications: BRONCHOSPASM PREVENTION, Chronic Obstructive Pulmonary Disease    ASPIRIN (ASPIRIN) 325 MG TABLET    Take 325 mg by mouth daily. ATORVASTATIN (LIPITOR) 20 MG TABLET    Take  by mouth daily. CHLORPROMAZINE (THORAZINE) 10 MG TABLET    Take 2 Tabs by mouth every six (6) hours as needed. CHOLECALCIFEROL, VITAMIN D3, (VITAMIN D3) 5,000 UNIT TAB TABLET    Take 5,000 Units by mouth daily. CLOPIDOGREL (PLAVIX) 75 MG TAB    Take 1 Tab by mouth daily. CYCLOBENZAPRINE (FLEXERIL) 5 MG TABLET    Take 2 Tabs by mouth three (3) times daily (with meals).     CYCLOBENZAPRINE (FLEXERIL) 5 MG TABLET    Take 2 Tabs by mouth three (3) times daily (with meals). ESOMEPRAZOLE (NEXIUM) 20 MG CAPSULE    Take 20 mg by mouth daily. HYDROXYCHLOROQUINE (PLAQUENIL) 200 MG TABLET    Take 200 mg by mouth daily. LEFLUNOMIDE (ARAVA) 20 MG TABLET    Take 20 mg by mouth daily. PREDNISONE (DELTASONE) 5 MG TABLET    Take  by mouth daily. Take 1 to 3 tabs daily   These Medications have changed    No medications on file   Stop Taking    No medications on file         DO Sara Haynes medical dictation software was used for portions of this report. Unintended transcription errors may occur. My signature above authenticates this document and my orders, the final    diagnosis (es), discharge prescription (s), and instructions in the Epic    record.

## 2020-01-03 NOTE — DISCHARGE INSTRUCTIONS
Patient Education      If you were prescribed any medication take as directed. Do not drive or use heavy equipment if prescribed narcotics. Follow up with your primary care physician or with specialist as directed. Return to the emergency room with any new or worsening conditions. Chest Pain: Care Instructions  Your Care Instructions    There are many things that can cause chest pain. Some are not serious and will get better on their own in a few days. But some kinds of chest pain need more testing and treatment. Your doctor may have recommended a follow-up visit in the next 8 to 12 hours. If you are not getting better, you may need more tests or treatment. Even though your doctor has released you, you still need to watch for any problems. The doctor carefully checked you, but sometimes problems can develop later. If you have new symptoms or if your symptoms do not get better, get medical care right away. If you have worse or different chest pain or pressure that lasts more than 5 minutes or you passed out (lost consciousness), call 911 or seek other emergency help right away. A medical visit is only one step in your treatment. Even if you feel better, you still need to do what your doctor recommends, such as going to all suggested follow-up appointments and taking medicines exactly as directed. This will help you recover and help prevent future problems. How can you care for yourself at home? · Rest until you feel better. · Take your medicine exactly as prescribed. Call your doctor if you think you are having a problem with your medicine. · Do not drive after taking a prescription pain medicine. When should you call for help? Call 911 if:    · You passed out (lost consciousness).     · You have severe difficulty breathing.     · You have symptoms of a heart attack. These may include:  ? Chest pain or pressure, or a strange feeling in your chest.  ? Sweating. ? Shortness of breath.   ? Nausea or vomiting. ? Pain, pressure, or a strange feeling in your back, neck, jaw, or upper belly or in one or both shoulders or arms. ? Lightheadedness or sudden weakness. ? A fast or irregular heartbeat. After you call 911, the  may tell you to chew 1 adult-strength or 2 to 4 low-dose aspirin. Wait for an ambulance. Do not try to drive yourself.    Call your doctor today if:    · You have any trouble breathing.     · Your chest pain gets worse.     · You are dizzy or lightheaded, or you feel like you may faint.     · You are not getting better as expected.     · You are having new or different chest pain. Where can you learn more? Go to http://messi-thang.info/. Enter A120 in the search box to learn more about \"Chest Pain: Care Instructions. \"  Current as of: June 26, 2019  Content Version: 12.2  © 0731-6229 [employer]. Care instructions adapted under license by [employer] (which disclaims liability or warranty for this information). If you have questions about a medical condition or this instruction, always ask your healthcare professional. [place] [address] any warranty or liability for your use of this information. Patient Education        Indigestion (Dyspepsia or Heartburn): Care Instructions  Your Care Instructions  Sometimes it can be hard to pinpoint the cause of indigestion. (It is also called dyspepsia or heartburn.) Most cases of an upset stomach with bloating, burning, burping, and nausea are minor and go away within several hours. Home treatment and over-the-counter medicine often are able to control symptoms. But if you take medicine to relieve your indigestion without making diet and lifestyle changes, your symptoms are likely to return again and again. If you get indigestion often, it may be a sign of a more serious medical problem.  Be sure to follow up with your doctor, who may want to do tests to be sure of the cause of your indigestion. Follow-up care is a key part of your treatment and safety. Be sure to make and go to all appointments, and call your doctor if you are having problems. It's also a good idea to know your test results and keep a list of the medicines you take. How can you care for yourself at home? · Your doctor may recommend over-the-counter medicine. For mild or occasional indigestion, antacids such as Gaviscon, Mylanta, Maalox, or Tums, may help. Be safe with medicines. Be careful when you take over-the-counter antacid medicines. Many of these medicines have aspirin in them. Read the label to make sure that you are not taking more than the recommended dose. Too much aspirin can be harmful. · Your doctor also may recommend over-the-counter acid reducers, such as Pepcid AC, Tagamet HB, Zantac 75, or Prilosec. Read and follow all instructions on the label. If you use these medicines often, talk with your doctor. · Change your eating habits. ? It's best to eat several small meals instead of two or three large meals. ? After you eat, wait 2 to 3 hours before you lie down. ? Chocolate, mint, and alcohol can make GERD worse. ? Spicy foods, foods that have a lot of acid (like tomatoes and oranges), and coffee can make GERD symptoms worse in some people. If your symptoms are worse after you eat a certain food, you may want to stop eating that food to see if your symptoms get better. · Do not smoke or chew tobacco. Smoking can make GERD worse. If you need help quitting, talk to your doctor about stop-smoking programs and medicines. These can increase your chances of quitting for good. · If you have GERD symptoms at night, raise the head of your bed 6 to 8 inches. You can do this by putting the frame on blocks or placing a foam wedge under the head of your mattress. (Adding extra pillows does not work.)  · Do not wear tight clothing around your middle. · Lose weight if you need to.  Losing just 5 to 10 pounds can help.  · Do not take anti-inflammatory medicines, such as aspirin, ibuprofen (Advil, Motrin), or naproxen (Aleve). These can irritate the stomach. If you need a pain medicine, try acetaminophen (Tylenol), which does not cause stomach upset. When should you call for help? Call your doctor now or seek immediate medical care if:    · You have new or worse belly pain.     · You are vomiting.    Watch closely for changes in your health, and be sure to contact your doctor if:    · You have new or worse symptoms of indigestion.     · You have trouble or pain swallowing.     · You are losing weight.     · You do not get better as expected. Where can you learn more? Go to http://messi-thang.info/. Enter P214 in the search box to learn more about \"Indigestion (Dyspepsia or Heartburn): Care Instructions. \"  Current as of: November 7, 2018  Content Version: 12.2  © 1699-4540 [employer]. Care instructions adapted under license by [employer] (which disclaims liability or warranty for this information). If you have questions about a medical condition or this instruction, always ask your healthcare professional. [address] any warranty or liability for your use of this information.

## 2020-01-04 LAB
ATRIAL RATE: 55 BPM
CALCULATED P AXIS, ECG09: 46 DEGREES
CALCULATED R AXIS, ECG10: 34 DEGREES
CALCULATED T AXIS, ECG11: 55 DEGREES
DIAGNOSIS, 93000: NORMAL
P-R INTERVAL, ECG05: 172 MS
Q-T INTERVAL, ECG07: 460 MS
QRS DURATION, ECG06: 92 MS
QTC CALCULATION (BEZET), ECG08: 440 MS
VENTRICULAR RATE, ECG03: 55 BPM

## 2020-10-09 ENCOUNTER — TRANSCRIBE ORDER (OUTPATIENT)
Dept: SCHEDULING | Age: 65
End: 2020-10-09

## 2020-10-09 DIAGNOSIS — C85.90 NON HODGKIN'S LYMPHOMA (HCC): Primary | ICD-10-CM

## 2020-10-23 ENCOUNTER — HOSPITAL ENCOUNTER (OUTPATIENT)
Dept: CT IMAGING | Age: 65
Discharge: HOME | End: 2020-10-23
Attending: PHYSICIAN ASSISTANT
Payer: MEDICARE

## 2020-10-23 DIAGNOSIS — C85.90 NON HODGKIN'S LYMPHOMA (HCC): ICD-10-CM

## 2020-10-23 LAB — CREAT UR-MCNC: 0.9 MG/DL (ref 0.6–1.3)

## 2020-10-23 PROCEDURE — 82565 ASSAY OF CREATININE: CPT

## 2020-10-23 PROCEDURE — 74011000636 HC RX REV CODE- 636

## 2020-10-23 PROCEDURE — 74177 CT ABD & PELVIS W/CONTRAST: CPT

## 2020-10-23 RX ADMIN — IOPAMIDOL 100 ML: 612 INJECTION, SOLUTION INTRAVENOUS at 16:18

## 2020-11-11 NOTE — ED TRIAGE NOTES
BIBA from home for SOB. Pt denies chest pain, n/v, dizziness or sweating. Pt states it feels like he is being choked. Medic report giving pt 1 albuterol treatment. Pt speaking in full sentences at this time.
Home

## 2022-03-19 PROBLEM — I70.213 ATHEROSCLEROSIS OF NATIVE ARTERY OF BOTH LOWER EXTREMITIES WITH INTERMITTENT CLAUDICATION (HCC): Status: ACTIVE | Noted: 2018-07-25

## 2022-07-28 NOTE — BRIEF OP NOTE
BRIEF OPERATIVE NOTE    Date of Procedure: 10/23/2018   Preoperative Diagnosis: ATHEROSCLEROSIS BILATERAL EXTREMITIES WITH CLAUDICATION  Postoperative Diagnosis: ATHEROSCLEROSIS BILATERAL EXTREMITIES WITH     Procedure(s):  RIGHT LEG SUPERFICIAL FEMORAL ENDARTERECTOMY/RIGHT LOWER EXTREMITY ANGIOGRAM/POSSIBLE STENT/RIGHT COMMON FEMORAL ARTERY TO RIGHT ABOVE KNEE POPLITEAL GREATER SAPHENOUS VEIN BYPASS  Surgeon(s) and Role:     * Corrine Wilkinson MD - Primary         Surgical Assistant: none    Surgical Staff:  Marty-1Valentino Lefevre, RN  Radiology Technician: Roger Gibbs  Scrub Tech-1: Isabel Ear A  Surg Asst-1: Syble Gores  Surg Asst-2: Isidore Gabriel  Event Time In Time Out   Incision Start 1040    Incision Close       Anesthesia: General   Estimated Blood Loss: 200mL  Specimens: * No specimens in log *   Findings: occluded SFA   Complications: sheath went outside wall of SFA repaired primarily. Not all plaque removed with device so stented. Implants:   Implant Name Type Inv.  Item Serial No.  Lot No. LRB No. Used Action   STENT VASC CATH 120CM [id number] -- Kelechi Filler ENDOPROSTHESIS - [de-identified] Stent STENT VASC CATH 120CM [id number] -- Kelechi Filler ENDOPROSTHESIS [de-identified]  [employer] N/A Right 2 Implanted   VASCURE BY [employer]   N/A  [id number] Right 1 Implanted Chief Complaint   Patient presents with    Follow-up     C/o poss vent hernia - ct results 7/19/22        SUBJECTIVE:  HPI: Patient presents for evaluation of incisional hernia. Symptoms were first noted 6 months ago. Pain is progressive and worsening . Lump is reducible. Pt denies nausea vomiting. Pt with previous history of abdominal surgery prostate surgery in 7841 complicated with bowel injury requiring ex lap. I have reviewed the patient's(pertinent information to this visit) medical history, family history(scanned in  theMedia tab under \"patient questioner\"), social history and review of systems with the patient today in the office.        Past Surgical History:   Procedure Laterality Date    CARDIAC CATHETERIZATION  2015    COLONOSCOPY  Last Done In 2000's    Polyps Removed In Past    DENTAL SURGERY      Teeth Extracted In Past    [facility] 09/04/2018    PROSTATE BIOPSY  2016, 2017    \"Prostate Cancer Dx In 2016\"    [address]     Past Medical History:   Diagnosis Date    Acid reflux     Diverticulitis Last Episode In 2013    Hx of blood clots Dx 2013    \"Left Arm\"    Hyperlipidemia     Hypertension     Parathyroid adenoma     Prostate Cancer Dx 2016    2 Prostate Biopsies Done, Last Done In 2017    Teeth missing     Upper And Lower    Wears glasses     Wears partial dentures     Upper        Family History   Problem Relation Age of Onset    Stroke Mother     Mental Illness Mother     Obesity Mother      Social History     Socioeconomic History    Marital status:      Spouse name: Not on file    Number of children: Not on file    Years of education: Not on file    Highest education level: Not on file   Occupational History    Not on file   Tobacco Use    Smoking status: Never    Smokeless tobacco: Never   Vaping Use    Vaping Use: Never used   Substance and Sexual Activity    Alcohol use: Yes     Comment: \"A Couple Times A Week\"    Drug use: No    Sexual activity: Yes     Partners: Female   Other Topics Concern    Not on file   Social History Narrative    Not on file     Social Determinants of Health     Financial Resource Strain: Not on file   Food Insecurity: Not on file   Transportation Needs: Not on file   Physical Activity: Not on file   Stress: Not on file   Social Connections: Not on file   Intimate Partner Violence: Not on file   Housing Stability: Not on file       Current Outpatient Medications   Medication Sig Dispense Refill    carvedilol (COREG) 3.125 MG tablet Take 3.125 mg by mouth 2 times daily      rosuvastatin (CRESTOR) 20 MG tablet TAKE 1 TABLET BY MOUTH EVERYDAY AT BEDTIME      montelukast (SINGULAIR) 10 MG tablet Take 10 mg by mouth nightly      docusate sodium (COLACE) 100 MG capsule Take 1 capsule by mouth 2 times daily      aspirin 81 MG chewable tablet Take 1 tablet by mouth daily 90 tablet 0    albuterol sulfate  (90 Base) MCG/ACT inhaler Inhale 2 puffs into the lungs every 4 hours as needed for Wheezing 18 g 0    magnesium oxide (MAG-OX) 400 MG tablet Take 1 tablet by mouth daily (Patient not taking: No sig reported) 30 tablet 0    cyclobenzaprine (FLEXERIL) 10 MG tablet Take 5 mg by mouth 3 times daily as needed (Patient not taking: No sig reported)      mirtazapine (REMERON) 15 MG tablet Take 15 mg by mouth nightly (Patient not taking: No sig reported)      oxybutynin (DITROPAN) 5 MG tablet Take 5 mg by mouth 3 times daily as needed (Patient not taking: No sig reported)      oxyCODONE (ROXICODONE) 5 MG immediate release tablet Take 5 mg by mouth every 6 hours as needed.  (Patient not taking: No sig reported)      PARoxetine (PAXIL) 20 MG tablet Take 20 mg by mouth daily (Patient not taking: No sig reported)      atorvastatin (LIPITOR) 80 MG tablet Take 1 tablet by mouth nightly (Patient not taking: No sig reported) 90 tablet 0    clopidogrel (PLAVIX) 75 MG tablet Take 1 tablet by mouth daily 90 tablet 0    nitroGLYCERIN (NITROSTAT) 0.4 MG SL tablet Place 1 tablet under tongue upon chest pain, wait 5 minutes and may repeat up to 3 doses in 15 minutes. Do not crush or break. (Patient not taking: No sig reported) 30 tablet 0    Mineral Oil OIL Take by mouth daily Over The Counter (Patient not taking: Reported on 7/28/2022)      tamsulosin (FLOMAX) 0.4 MG capsule Take 0.8 mg by mouth nightly  (Patient not taking: No sig reported)       No current facility-administered medications for this visit. Allergies   Allergen Reactions    Sulfa Antibiotics      \"Tongue Coats\"       Review of Systems:         Review of Systems   Constitutional:  Negative for chills and fever. HENT:  Negative for ear pain, mouth sores, sore throat and tinnitus. Eyes:  Negative for photophobia, redness and itching. Respiratory:  Negative for apnea, choking and stridor. Cardiovascular:  Negative for chest pain and palpitations. Gastrointestinal:  Positive for abdominal pain. Negative for anal bleeding, constipation and rectal pain. Endocrine: Negative for polydipsia. Genitourinary:  Negative for enuresis, flank pain and hematuria. Musculoskeletal:  Negative for back pain, joint swelling and myalgias. Skin:  Negative for color change and pallor. Allergic/Immunologic: Negative for environmental allergies. Neurological:  Negative for syncope and speech difficulty. Psychiatric/Behavioral:  Negative for confusion and hallucinations. OBJECTIVE:  Physical Exam:    /82   Pulse 88   Ht 6' 1\" (1.854 m)   Wt 209 lb 12.8 oz (95.2 kg)   SpO2 100%   BMI 27.68 kg/m²      Physical Exam  Constitutional:       Appearance: He is well-developed. HENT:      Head: Normocephalic. Eyes:      Pupils: Pupils are equal, round, and reactive to light. Cardiovascular:      Rate and Rhythm: Normal rate. Pulmonary:      Effort: Pulmonary effort is normal.   Abdominal:      General: There is no distension. Palpations: Abdomen is soft.  There is no mass.      Tenderness: There is abdominal tenderness. There is no guarding or rebound. Hernia: A hernia is present. Musculoskeletal:         General: Normal range of motion. Cervical back: Normal range of motion and neck supple. Skin:     General: Skin is warm. Neurological:      Mental Status: He is alert and oriented to person, place, and time. ASSESSMENT:  1. Ventral hernia without obstruction or gangrene            PLAN:  Treatment: Will proceed with robotic assisted incisional hernia repair. 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 Patient counseled on risks, benefits, and alternatives of treatment plan at length. Patient states anunderstanding and willingness to proceed with plan. No orders of the defined types were placed in this encounter. No orders of the defined types were placed in this encounter. Follow Up:  No follow-ups on file.       Carmina Smiley MD

## 2022-08-31 NOTE — Clinical Note
Diagnostic wire removed. Scc Poorly Differentiated Histology Text: Full thickness keratinocyte atypia is observed microscopically. Focal dyskeratosis and apoptosis of keratinocytes is observed. The epidermis is acanthotic with growth of atypical keratinocytes beyond the level of the sebaceous glands, most keratinocytes show little to no keratinization.

## 2023-02-20 ENCOUNTER — HOSPITAL ENCOUNTER (OUTPATIENT)
Facility: HOSPITAL | Age: 68
Discharge: HOME | End: 2023-02-23
Payer: MEDICARE

## 2023-02-20 DIAGNOSIS — C61 MALIGNANT NEOPLASM OF PROSTATE (HCC): ICD-10-CM

## 2023-02-20 PROCEDURE — 36415 COLL VENOUS BLD VENIPUNCTURE: CPT

## 2023-02-20 PROCEDURE — 81001 URINALYSIS AUTO W/SCOPE: CPT

## 2023-02-20 PROCEDURE — 87086 URINE CULTURE/COLONY COUNT: CPT

## 2023-02-20 PROCEDURE — 93005 ELECTROCARDIOGRAM TRACING: CPT

## 2023-02-21 LAB
APPEARANCE UR: ABNORMAL
BACTERIA SPEC CULT: NORMAL
BACTERIA URNS QL MICRO: ABNORMAL /HPF
BILIRUB UR QL: NEGATIVE
COLOR UR: YELLOW
EKG ATRIAL RATE: 60 BPM
EKG DIAGNOSIS: NORMAL
EKG P AXIS: 33 DEGREES
EKG P-R INTERVAL: 170 MS
EKG Q-T INTERVAL: 412 MS
EKG QRS DURATION: 84 MS
EKG QTC CALCULATION (BAZETT): 412 MS
EKG R AXIS: 23 DEGREES
EKG T AXIS: 52 DEGREES
EKG VENTRICULAR RATE: 60 BPM
EPITH CASTS URNS QL MICRO: ABNORMAL /LPF (ref 0–5)
GLUCOSE UR STRIP.AUTO-MCNC: NEGATIVE MG/DL
HGB UR QL STRIP: ABNORMAL
KETONES UR QL STRIP.AUTO: NEGATIVE MG/DL
LEUKOCYTE ESTERASE UR QL STRIP.AUTO: ABNORMAL
NITRITE UR QL STRIP.AUTO: POSITIVE
PH UR STRIP: 6 (ref 5–8)
PROT UR STRIP-MCNC: NEGATIVE MG/DL
RBC #/AREA URNS HPF: ABNORMAL /HPF (ref 0–5)
SERVICE CMNT-IMP: NORMAL
SP GR UR REFRACTOMETRY: 1.01 (ref 1–1.03)
UROBILINOGEN UR QL STRIP.AUTO: 0.2 EU/DL (ref 0.2–1)
WBC URNS QL MICRO: ABNORMAL /HPF (ref 0–4)

## 2023-02-28 NOTE — PERIOP NOTE
PRE-SURGICAL INSTRUCTIONS        Patient's Name:  Dilan Norton. Today's Date:  2/28/2023            Covid Testing Date and Time:    Surgery Date:  3/2/2023                Do NOT eat or drink anything, including candy, gum, or ice chips after midnight on 3/1/2023, unless you have specific instructions from your surgeon or anesthesia provider to do so. You may brush your teeth before coming to the hospital.  No smoking 24 hours prior to the day of surgery. No alcohol 24 hours prior to the day of surgery. No recreational drugs for one week prior to the day of surgery. Leave all valuables, including money/purse, at home. Remove all jewelry, nail polish, acrylic nails, and makeup (including mascara); no lotions powders, deodorant, or perfume/cologne/after shave on the skin. Follow instruction for Hibiclens washes and CHG wipes from surgeon's office. Glasses/contact lenses and dentures may be worn to the hospital.  They will be removed prior to surgery. Call your doctor if symptoms of a cold or illness develop within 24-48 hours prior to your surgery. 11.  If you are having an outpatient procedure, please make arrangements for a responsible ADULT TO [address] [place] [address] and stay with you for 24 hours after your surgery. 12. ONE VISITOR in the hospital at this time for outpatient procedures. Exceptions may be made for surgical admissions, per nursing unit guidelines      Special Instructions:      Bring list of CURRENT medications. Bring inhaler. Bring any pertinent legal medical records. Take these medications the morning of surgery with a sip of water:  As directed by physician  Follow physician instructions about stopping anticoagulants. Complete bowel prep per MD instructions. On the day of surgery, come in the main entrance of [facility]. Let the  at the desk know you are there for surgery.   A staff member will come escort you to the surgical area on the second floor. If you have any questions or concerns, please do not hesitate to call:     (Prior to the day of surgery) PAT department:  [phone]   (Day of surgery) Pre-Op department:  [phone]    These surgical instructions were reviewed with patient during the PAT phone call.

## 2023-03-01 ENCOUNTER — ANESTHESIA EVENT (OUTPATIENT)
Facility: HOSPITAL | Age: 68
End: 2023-03-01
Payer: MEDICARE

## 2023-03-01 NOTE — H&P
H&P    Patient: Kateryna April        YOB: 1955  Patient ID: [id number]       Referring MD:  Akhil Russ MD  Radiation Oncologist: Virginia Martinez MD  Patient Diagnosis:  C61 - Malignant neoplasm of prostate, Diagnosed 11/18/2022 (Active)    AJCC Staging has been reviewed. IDENTIFICATION:   75year old male with high risk large volume prostate cancer primarily in the left gland (GS 4+5 in 8/12 cores, GS 3+4 in 1 core, PNI present, volume 32 mL, PSA 11.11, concern for extracapsular extension on MRI) referred by Dr. Forrest Pedersen for radiotherapy management of disease. HISTORY OF PRESENT ILLNESS: Mr. José Miguel Max is a 75year old male with high risk large volume prostate cancer primarily in the left gland. He was initially referred to Dr. Forrest Pedersen for evaluation of elevated PSA. PSA in 6/2022 was 11.11 and in 8/2022 was 10.55. MRI prostate obtained 10/12/22 revealed a lesion in the left lateral peripheral zone extending from base ot apex with broad-based capsular abutment concerning for capsular extension as well as a lesion in the hypertrophied central gland. Fusion biopsy was performed on 11/18/22 which revealed GS 4+5 in 8 cores involving up to 90% and 2 of these cores with PNI and GS 3+4 in 1 core. 9 cores of 14 involved. Prostate volume 32 mL. Mr. José Miguel Max is felt to be a poor surgical candidate. Dr. Forrest Pedersen has referred Mr. José Miguel Max to me for radiotherapy and recommends 24 months of ADT. He currently receives Abiraterone and Prednisone. The oncologic history is as follows:   10/12/22: MRI pelvis - 32 mL prostate volume; lesion 1 in the left lateral peripheral zone extends from base to apex with broad-based capsular abutment concerning for extracapsular extenion, PIRADS 5; lesion 2 in the hypertrophied central gland; abnormal signal in the right sacrum and dorsal iliac likely related to prior fracture and/or intervention.    11/18/22: Fusion biopsy - GS 4+5 in right base #2 RADHA (30%), left apex (80%), left mid (20%), left base (40%), left lateral apex (80%), left lateral mid (90%) with PNI, left lateral base (50%) with PNI, left mid #2 RADHA (80%), GS 3+4 in right apex (5%). 12/22/22: Bone scan - no evidence of osteoblastic metastatic disease. Mr. Tejas Gary reports urinary frequency/urgency more than half the time, weak/intermittent stream, incomplete emptying, and straining. He also reports nocturia x4. He takes Tamsulosin 0.4 mg daily. He denies hematuria and dysuria. His last colonoscopy was 3 years ago with 3 year follow up recommended. He denies diarrhea and bloody stools. There is no personal history of collagen vascular disorder, pacemaker, ulcerative colitis, or Crohn's disease. Family history includes father having had pancreatic cancer. Baseline:  PSA: 11.11  IPSS: 12  UQOL: 4  IIEF5: 5  EPICCP: 21     PSA History:  8/2022 - 10.55  6/2022 - 11.11    STAGING:   C61 - Malignant neoplasm of prostate, Diagnosed 11/18/2022 (Active)    PATHOLOGY:  C61 - Malignant neoplasm of prostate    Cell Histology Category: Adenocarcinoma; Type: Adenocarcinoma, NOS   Abbie Score Predominant/Major: 4; Secondary/Minor: 5; Total: 9   Core Assessment Taken: 14; Positive: 9   Invasive Tumor Details Invasive Tumor: Yes       Medical History:  - Cardiovascular disease (CAD, heart failure, peripheral artery dse) ,  - cOPD,  - gastroesophageal reflux,  - h/O chemo & radiation therapy,  - hypertension,  - non-Nodgkin's Lymphoma, bone,  - osteoarthritis. Surgical History: Biopsy on 11/18/2022 (prostate), colonoscopy on 12/9/2019, deviated septum repair, heart catheterization with coronary stent placement on 6/13/2013, hernia repair on 12/5/2017 (R inguinal, reducible; Dr James Yañez), skin CA excision, face and vascular stent x2 R leg in 9/2018 (Dr Della oRbbins).     Allergies:   No Known Allergies    Current Medications: Albuterol Sulfate (108 (90 Base) mcg/act) Aerosol Powder, Breath Activated Inhalation Aspirin (325 mg) Tablet Oral   chlorproMAZINE HCl (10 mg) Tablet Oral   Cholecalciferol (125 MCG ) Tablet Oral   Esomeprazole Magnesium (20 mg) Tablet, enteric coated Oral   Hydroxychloroquine Sulfate (200 mg) Tablet Oral   Hydroxychloroquine Sulfate (200 mg) Tablet Oral   Leflunomide (20 mg) Tablet Oral   predniSONE (5 mg) Tablet Oral   Tamsulosin HCl (0.4 mg) Capsule Oral    PQRS MEDICATION RECONCILIATION:  Medications documented and reviewed    Family History: Father is  having experienced pancreatic cancer. Mother is . Brother is alive. Sister is alive. Social History: Last screened on 2023 - Yes - but has quit for 5 years. Smoked 2.0 packs/day for 40 years (80 pack years). Contact indicated with the following hazardous materials: asbestos and radiation. Patient indicated use of the following products: cigarettes. Patient indicated access to the following support systems: Adequate transportation available for expected visits, Lives with spouse, significant other, family, or friends, and Supportive family/friends willing to assist with needs. Patient indicated the following nutritional habits: Regular meals. Patient indicated participation in the following forms of activity: Daily activities and Sexually inactive. Education: College 1 yr  Employment: . Review of Systems:    Constitutional Complains of mild fatigue associated with normal activity and this condition is stable. Denies lack of appetite, fever, lethargy, malaise, night sweats, rigors / chills and change in weight. Cardiovascular Denies arrhythmias, chest pain, dyspnea, edema, orthopnea and palpitations. Respiratory Denies cough, dyspnea, hemoptysis, hiccoughs, pleuritic chest pain and wheezing. r/t COPD & emphysema   Gastrointestinal Complains of mild heartburn / dyspepsia. Complains of hemorrhoids.  Denies abdominal pain, change in bowel habits, constipation, diarrhea, hematemesis, hematochezia, melena / GI bleeding, nausea, pain / cramping, satiety and vomiting. ROS Genitourinary (M) Complains of frequency, incontinence occasional leaking, no pads/liners, nocturia x4 and urgency. Denies dysuria, hematuria, impotence, renal stone disease, retrograde ejaculation, scrotal swelling and urine color change. intermittency, weak stream; incomplete emptying   Musculoskeletal Complains of mild arthritis associated with joint pain which is relieved by medications. Complains of localized muscle weakness which is mild in nature associated with inflammation and this condition is stable hands, legs & arms. Complains of a 50% decrease in range of motion which is relieved by pain management and this condition is stable arms, legs & hands. Denies bone pain and joint pain. Neurologic Complains of intermittent dizziness that occurs with activity associated with postural hypotension which is improved with balancing exercises and this condition is continuing. Denies disorientation, abnormal gait, headaches, insomnia, memory loss, motor weakness, sensory problems, paralysis, seizure and stroke. Psychiatric Denies delusions, hallucinations, mood swings, depression and euphoria. Endocrine Denies diabetes, hot flashes and thyroid disease. PERFORMANCE STATUS: 0 - Fully active, able to carry on all predisease activities without restrictions. (ECOG)    Vitals: Performed on 1/9/2023 12:36 PM   BMI - 29.119 kg/m2 (HIGH) -    Height - 72 in -    Weight - 214.7 lbs -    Temperature - 98 F -    Pulse - 99 /min -    Respiration - 18 /min -    O2 Sat - 97 % -    Pain - 0 -    Fatigue - 0 -    Fall Risk - n -    BP - 127/ 83 mm(hg) - ;    Physical Exam:    Constitutional No evidence of impaired alertness, inadequate appearance, premature or advanced chronologic age, uncooperativeness, developmental delays, altered mood and affect and disorientation. Head No evidence of alopecia, abnormal cephalic and scars.    Eyes No evidence of conjunctivitis, nonreactive pupil(s) and scleral abnormalities. Neck No evidence of tender or enlarged lymph nodes, neck abnormalities, restricted range of motion and enlarged thyroid gland. Integumentary No evidence of blistering, bruising, dry skin, erythema, nails changes, altered pigmentation, rash and urticaria. Chest No evidence of chest abnormalities and tender or enlarged lymph nodes. Cardiovascular No evidence of abnormal heart rate, heart arrhythmia and abnormal heart sounds. Respiratory No evidence of abnormal breath sounds. Abdomen No evidence of abdominal abnormalities, abnormal bowel sounds, hepatomegaly and splenomegaly. Extremities No evidence of lower extremities abnormalities and upper extremities abnormalities. Back/Spine No evidence of reduced flexibility and abnormal spinal curvature. Musculoskeletal No evidence of bone abnormalities, joint abnormalities, compromised muscle tone and restricted range of motion. Neurologic No evidence of uncoordinated gait. Psychiatric No evidence of altered affect, lack of comprehension and disorientation. Hematologic/Lymphatic No evidence of tender or enlarged lymph nodes. Impression:   75year old male with high risk large volume prostate cancer primarily in the left gland (GS 4+5 in 8/12 cores, GS 3+4 in 1 core, PNI present, volume 32 mL, PSA 11.11, concern for extracapsular extension on MRI) referred by Dr. Lynda Meigs for radiotherapy management of disease. Plan:    I discussed prior treatment flores with Dr. Luna Marks. Mr. Lindsey Winters' prior treatment flores included the shoulder and his right SI/iliac crest. No pelvic nodes were irradiated, and the fields were AP/PA, delivered in 2013. It appears that an IMRT approach is feasible with right pelvic nodes superior border just below the SI joint. Disease is predominantly left-sided and lower ext/int iliac nodes will be feasible to treat.   I thoroughly explained the potential acute and late side effects of IMRT/IGRT including (but not limited to) urinary symptoms (frequency, urgency, nocturia, hematuria, dysuria, incontinence), fatigue, osteopenia, avascular necrosis of the femoral heads, erectile dysfunction, dry ejaculate, infertility, diarrhea, GI bleed, bladder inflammation/injury, bowel inflammation/injury that could require surgery, rectal injury that could require surgery, and skin changes/pain/desquamation. I discussed the importance of a comfortably full bladder and reproducibly empty rectum as well as bowel management for IMRT. I explained the use of fiducial markers and SpaceOAR gel for targeting and reducing the dose to the rectum, respectively. Mr. Gómez Mahoney' questions were answered. He expressed an understanding of the above and a desire to proceed with radiation therapy. Given significant LUTS, I recommend standard fractionation IMRT/IGRT (7920 cGy in 44 fractions) to treat the prostate, seminal vesicles, and pelvic nodes. ADT was initiated 1/13/23. Cody Patel will call Mr. Gómez Mahoney to schedule fiducial marker and SpaceOAR placement at [facility]. CT simulation and MRI pelvis for planning at least 1 week following fiducial marker placement. Plan for 7920 cGy in 44 fractions to include the prostate, seminal vesicles, and pelvic nodes (modified fields to adjust for prior treatment fields noted above; our office has obtained dosimetric records). Mr. Gómez Mahoney will present for Fiducial Marker and SpaceOAR hydrogel placement on 3/2/23. Potential risks and benefits discussed. He will then undergo CT simulation and MRI pelvis for planning as scheduled. IMRT/IGRT as above to follow.

## 2023-03-02 ENCOUNTER — HOSPITAL ENCOUNTER (OUTPATIENT)
Facility: HOSPITAL | Age: 68
Setting detail: RECURRING SERIES
Discharge: HOME | End: 2023-03-05

## 2023-03-02 ENCOUNTER — HOSPITAL ENCOUNTER (OUTPATIENT)
Facility: HOSPITAL | Age: 68
Setting detail: OUTPATIENT SURGERY
Discharge: HOME | End: 2023-03-02
Attending: RADIOLOGY | Admitting: RADIOLOGY
Payer: MEDICARE

## 2023-03-02 ENCOUNTER — ANESTHESIA (OUTPATIENT)
Facility: HOSPITAL | Age: 68
End: 2023-03-02
Payer: MEDICARE

## 2023-03-02 VITALS
SYSTOLIC BLOOD PRESSURE: 123 MMHG | RESPIRATION RATE: 18 BRPM | TEMPERATURE: 97.2 F | HEIGHT: 72 IN | BODY MASS INDEX: 28.58 KG/M2 | OXYGEN SATURATION: 94 % | WEIGHT: 211 LBS | DIASTOLIC BLOOD PRESSURE: 78 MMHG | HEART RATE: 81 BPM

## 2023-03-02 PROCEDURE — 2709999900 HC NON-CHARGEABLE SUPPLY: Performed by: RADIOLOGY

## 2023-03-02 PROCEDURE — 7100000000 HC PACU RECOVERY - FIRST 15 MIN: Performed by: RADIOLOGY

## 2023-03-02 PROCEDURE — 7100000011 HC PHASE II RECOVERY - ADDTL 15 MIN: Performed by: RADIOLOGY

## 2023-03-02 PROCEDURE — 3600000012 HC SURGERY LEVEL 2 ADDTL 15MIN: Performed by: RADIOLOGY

## 2023-03-02 PROCEDURE — 7100000001 HC PACU RECOVERY - ADDTL 15 MIN: Performed by: RADIOLOGY

## 2023-03-02 PROCEDURE — 3700000001 HC ADD 15 MINUTES (ANESTHESIA): Performed by: RADIOLOGY

## 2023-03-02 PROCEDURE — 2580000003 HC RX 258: Performed by: NURSE ANESTHETIST, CERTIFIED REGISTERED

## 2023-03-02 PROCEDURE — 3600000002 HC SURGERY LEVEL 2 BASE: Performed by: RADIOLOGY

## 2023-03-02 PROCEDURE — A4648 IMPLANTABLE TISSUE MARKER: HCPCS | Performed by: RADIOLOGY

## 2023-03-02 PROCEDURE — 2500000003 HC RX 250 WO HCPCS: Performed by: NURSE ANESTHETIST, CERTIFIED REGISTERED

## 2023-03-02 PROCEDURE — 6370000000 HC RX 637 (ALT 250 FOR IP): Performed by: NURSE ANESTHETIST, CERTIFIED REGISTERED

## 2023-03-02 PROCEDURE — C1889 IMPLANT/INSERT DEVICE, NOC: HCPCS | Performed by: RADIOLOGY

## 2023-03-02 PROCEDURE — 3700000000 HC ANESTHESIA ATTENDED CARE: Performed by: RADIOLOGY

## 2023-03-02 PROCEDURE — 6360000002 HC RX W HCPCS: Performed by: RADIOLOGY

## 2023-03-02 PROCEDURE — 7100000010 HC PHASE II RECOVERY - FIRST 15 MIN: Performed by: RADIOLOGY

## 2023-03-02 PROCEDURE — 6360000002 HC RX W HCPCS: Performed by: NURSE ANESTHETIST, CERTIFIED REGISTERED

## 2023-03-02 PROCEDURE — 2580000003 HC RX 258: Performed by: RADIOLOGY

## 2023-03-02 DEVICE — SPACEOAR SYSTEMS
Type: IMPLANTABLE DEVICE | Site: PERINEUM | Status: FUNCTIONAL
Brand: SPACEOAR VUE™ SYSTEM - 10ML

## 2023-03-02 RX ORDER — SODIUM CHLORIDE, SODIUM LACTATE, POTASSIUM CHLORIDE, CALCIUM CHLORIDE 600; 310; 30; 20 MG/100ML; MG/100ML; MG/100ML; MG/100ML
INJECTION, SOLUTION INTRAVENOUS CONTINUOUS
Status: DISCONTINUED | OUTPATIENT
Start: 2023-03-02 | End: 2023-03-02 | Stop reason: HOSPADM

## 2023-03-02 RX ORDER — MIDAZOLAM HYDROCHLORIDE 1 MG/ML
INJECTION INTRAMUSCULAR; INTRAVENOUS PRN
Status: DISCONTINUED | OUTPATIENT
Start: 2023-03-02 | End: 2023-03-02 | Stop reason: SDUPTHER

## 2023-03-02 RX ORDER — SODIUM CHLORIDE 9 MG/ML
INJECTION, SOLUTION INTRAVENOUS PRN
Status: DISCONTINUED | OUTPATIENT
Start: 2023-03-02 | End: 2023-03-02 | Stop reason: HOSPADM

## 2023-03-02 RX ORDER — SODIUM CHLORIDE 0.9 % (FLUSH) 0.9 %
5-40 SYRINGE (ML) INJECTION EVERY 12 HOURS SCHEDULED
Status: DISCONTINUED | OUTPATIENT
Start: 2023-03-02 | End: 2023-03-02 | Stop reason: HOSPADM

## 2023-03-02 RX ORDER — FAMOTIDINE 20 MG/1
20 TABLET, FILM COATED ORAL ONCE
Status: COMPLETED | OUTPATIENT
Start: 2023-03-02 | End: 2023-03-02

## 2023-03-02 RX ORDER — PHENYLEPHRINE HYDROCHLORIDE 10 MG/ML
INJECTION INTRAVENOUS PRN
Status: DISCONTINUED | OUTPATIENT
Start: 2023-03-02 | End: 2023-03-02 | Stop reason: SDUPTHER

## 2023-03-02 RX ORDER — PROPOFOL 10 MG/ML
INJECTION, EMULSION INTRAVENOUS PRN
Status: DISCONTINUED | OUTPATIENT
Start: 2023-03-02 | End: 2023-03-02 | Stop reason: SDUPTHER

## 2023-03-02 RX ORDER — ONDANSETRON 2 MG/ML
INJECTION INTRAMUSCULAR; INTRAVENOUS PRN
Status: DISCONTINUED | OUTPATIENT
Start: 2023-03-02 | End: 2023-03-02 | Stop reason: SDUPTHER

## 2023-03-02 RX ORDER — PROCHLORPERAZINE EDISYLATE 5 MG/ML
5 INJECTION INTRAMUSCULAR; INTRAVENOUS
Status: DISCONTINUED | OUTPATIENT
Start: 2023-03-02 | End: 2023-03-02 | Stop reason: HOSPADM

## 2023-03-02 RX ORDER — LIDOCAINE HYDROCHLORIDE 10 MG/ML
1 INJECTION, SOLUTION EPIDURAL; INFILTRATION; INTRACAUDAL; PERINEURAL
Status: DISCONTINUED | OUTPATIENT
Start: 2023-03-02 | End: 2023-03-02 | Stop reason: HOSPADM

## 2023-03-02 RX ORDER — FENTANYL CITRATE 50 UG/ML
25 INJECTION, SOLUTION INTRAMUSCULAR; INTRAVENOUS EVERY 5 MIN PRN
Status: DISCONTINUED | OUTPATIENT
Start: 2023-03-02 | End: 2023-03-02 | Stop reason: HOSPADM

## 2023-03-02 RX ORDER — ACETAMINOPHEN 325 MG/1
650 TABLET ORAL
Status: DISCONTINUED | OUTPATIENT
Start: 2023-03-02 | End: 2023-03-02 | Stop reason: HOSPADM

## 2023-03-02 RX ORDER — LIDOCAINE HYDROCHLORIDE 20 MG/ML
INJECTION, SOLUTION EPIDURAL; INFILTRATION; INTRACAUDAL; PERINEURAL PRN
Status: DISCONTINUED | OUTPATIENT
Start: 2023-03-02 | End: 2023-03-02 | Stop reason: SDUPTHER

## 2023-03-02 RX ORDER — ONDANSETRON 2 MG/ML
4 INJECTION INTRAMUSCULAR; INTRAVENOUS
Status: COMPLETED | OUTPATIENT
Start: 2023-03-02 | End: 2023-03-02

## 2023-03-02 RX ORDER — FENTANYL CITRATE 50 UG/ML
INJECTION, SOLUTION INTRAMUSCULAR; INTRAVENOUS PRN
Status: DISCONTINUED | OUTPATIENT
Start: 2023-03-02 | End: 2023-03-02 | Stop reason: SDUPTHER

## 2023-03-02 RX ORDER — SODIUM CHLORIDE 0.9 % (FLUSH) 0.9 %
5-40 SYRINGE (ML) INJECTION PRN
Status: DISCONTINUED | OUTPATIENT
Start: 2023-03-02 | End: 2023-03-02 | Stop reason: HOSPADM

## 2023-03-02 RX ADMIN — PROPOFOL 50 MG: 10 INJECTION, EMULSION INTRAVENOUS at 10:11

## 2023-03-02 RX ADMIN — ONDANSETRON 4 MG: 2 INJECTION INTRAMUSCULAR; INTRAVENOUS at 10:50

## 2023-03-02 RX ADMIN — SODIUM CHLORIDE, POTASSIUM CHLORIDE, SODIUM LACTATE AND CALCIUM CHLORIDE: 600; 310; 30; 20 INJECTION, SOLUTION INTRAVENOUS at 09:06

## 2023-03-02 RX ADMIN — FENTANYL CITRATE 25 MCG: 50 INJECTION, SOLUTION INTRAMUSCULAR; INTRAVENOUS at 10:11

## 2023-03-02 RX ADMIN — LIDOCAINE HYDROCHLORIDE 50 MG: 20 INJECTION, SOLUTION EPIDURAL; INFILTRATION; INTRACAUDAL; PERINEURAL at 10:50

## 2023-03-02 RX ADMIN — FENTANYL CITRATE 50 MCG: 50 INJECTION, SOLUTION INTRAMUSCULAR; INTRAVENOUS at 10:50

## 2023-03-02 RX ADMIN — PROPOFOL 30 MG: 10 INJECTION, EMULSION INTRAVENOUS at 10:40

## 2023-03-02 RX ADMIN — PHENYLEPHRINE HYDROCHLORIDE 200 MCG: 10 INJECTION INTRAVENOUS at 10:16

## 2023-03-02 RX ADMIN — FENTANYL CITRATE 25 MCG: 50 INJECTION, SOLUTION INTRAMUSCULAR; INTRAVENOUS at 10:01

## 2023-03-02 RX ADMIN — WATER 2000 MG: 1 INJECTION, SOLUTION INTRAMUSCULAR; INTRAVENOUS; SUBCUTANEOUS at 10:05

## 2023-03-02 RX ADMIN — FENTANYL CITRATE 25 MCG: 50 INJECTION, SOLUTION INTRAMUSCULAR; INTRAVENOUS at 10:08

## 2023-03-02 RX ADMIN — ONDANSETRON 4 MG: 2 INJECTION INTRAMUSCULAR; INTRAVENOUS at 12:46

## 2023-03-02 RX ADMIN — FAMOTIDINE 20 MG: 20 TABLET ORAL at 09:04

## 2023-03-02 RX ADMIN — MIDAZOLAM HYDROCHLORIDE 2 MG: 2 INJECTION, SOLUTION INTRAMUSCULAR; INTRAVENOUS at 09:54

## 2023-03-02 RX ADMIN — PROPOFOL 200 MG: 10 INJECTION, EMULSION INTRAVENOUS at 09:58

## 2023-03-02 RX ADMIN — LIDOCAINE HYDROCHLORIDE 50 MG: 20 INJECTION, SOLUTION EPIDURAL; INFILTRATION; INTRACAUDAL; PERINEURAL at 09:58

## 2023-03-02 RX ADMIN — FENTANYL CITRATE 25 MCG: 50 INJECTION, SOLUTION INTRAMUSCULAR; INTRAVENOUS at 10:04

## 2023-03-02 ASSESSMENT — PAIN SCALES - GENERAL
PAINLEVEL_OUTOF10: 0
PAINLEVEL_OUTOF10: 7
PAINLEVEL_OUTOF10: 0

## 2023-03-02 ASSESSMENT — PAIN - FUNCTIONAL ASSESSMENT: PAIN_FUNCTIONAL_ASSESSMENT: 0-10

## 2023-03-02 NOTE — DISCHARGE INSTRUCTIONS
DISCHARGE SUMMARY from Nurse    PATIENT INSTRUCTIONS:    After general anesthesia or intravenous sedation, for 24 hours or while taking prescription Narcotics:  Limit your activities  Do not drive and operate hazardous machinery  Do not make important personal or business decisions  Do  not drink alcoholic beverages  If you have not urinated within 8 hours after discharge, please contact your surgeon on call. Report the following to your surgeon:  Excessive pain, swelling, redness or odor of or around the surgical area  Temperature over 100.5  Nausea and vomiting lasting longer than 4 hours or if unable to take medications  Any signs of decreased circulation or nerve impairment to extremity: change in color, persistent  numbness, tingling, coldness or increase pain  Any questions        These are general instructions for a healthy lifestyle:    No smoking/ No tobacco products/ Avoid exposure to second hand smoke  Surgeon General's Warning:  Quitting smoking now greatly reduces serious risk to your health. Obesity, smoking, and sedentary lifestyle greatly increases your risk for illness    A healthy diet, regular physical exercise & weight monitoring are important for maintaining a healthy lifestyle    You may be retaining fluid if you have a history of heart failure or if you experience any of the following symptoms:  Weight gain of 3 pounds or more overnight or 5 pounds in a week, increased swelling in our hands or feet or shortness of breath while lying flat in bed. Please call your doctor as soon as you notice any of these symptoms; do not wait until your next office visit. The discharge information has been reviewed with the patient. The patient verbalized understanding.   Discharge medications reviewed with the patient and appropriate educational materials and side effects teaching were provided.   ___________________________________________________________________________________________________________________________________

## 2023-03-02 NOTE — INTERVAL H&P NOTE
Update History & Physical    The patient's History and Physical of March 2, 2023 was reviewed with the patient and I examined the patient. There was no change. The surgical site was confirmed by the patient and me. Plan: The risks, benefits, expected outcome, and alternative to the recommended procedure have been discussed with the patient. Patient understands and wants to proceed with the procedure.      Electronically signed by Lisbet Hager MD on 3/2/2023 at 9:26 AM

## 2023-03-02 NOTE — OP NOTE
Operative Note      Patient: Pino Gomez Jr. YOB: 1955  MRN: [id number]    Date of Procedure: 3/2/2023    Pre-Op Diagnosis: Prostate cancer ([address] Chloe Porter    Post-Op Diagnosis: Same       Procedure(s):  TRANSRECTAL ULTRASOUND GUIDED FIDUCIAL MARKER PLACEMENT; SPACE OAR    Surgeon(s):  Lowell Paul MD    Assistant:   * No surgical staff found *    Anesthesia: General    Estimated Blood Loss (mL): Minimal    Complications: Other: Inadequate bowel prep. Nurse performed enemas/lavage and probe re-introduced to rectum. Hemorrhoids were more inflamed at second insertion of probe, otherwise no complications. Specimens:   * No specimens in log *    Implants:  Implant Name Type Inv. Item Serial No.  Lot No. LRB No. Used Action   SPACER RAD THER SPACEOAR KAREN - [id number]  SPACER RAD THER SPACEOAR KAREN  [id number] N/A 1 Implanted         Drains: * No LDAs found *    Findings: No specimens taken. 3 gold seeds, 10 cc SpaceOAR hydrogel inserted    Detailed Description of Procedure:   Mr. Jas Rees was brought to OR 8 at [facility]. He was then positioned on the distal end of the ultrasound table in the dorsal lithotomy position with [id number] [address] and his hips slightly hyperflexed. Care was taken to align the patient straight on the table. The perineum was then prepped with HIBICLENS. The Biplanar multi-frequency B & K transrectal ultrasound transducer was then carefully introduced into the rectum and attached to the digital, electronic stepper unit which was mounted to the stabilizer unit. The gland was evaluated on both transverse and longitudinal imaging. Imaging was distorted, owing to significant quantity of stool in the rectum. Probe was removed and nurse Marla Nolan performed enema/lavage until clear. Probe was then reintroduced,  and the gland was evaluated on both transverse and longitudinal imaging.   Three 18G steel trocar needles with 60 degree bevels pre-loaded with single fiducial markers each spaced 2cm were then placed in turn under direct transrectal ultrasound guidance into the right and then left lobes of the prostate. The markers on the right were placed such that the cephalic extent was near the right base. The marker on the left was implanted in the same fashion such that the caudal extent was near the left apex. The position of all three markers was verified on ultrasound imaging and deemed to be satisfactory. Next, a 6 inch, 18G spinal needle was then placed in the posterior midline and advanced to the recto-prostatic space near midgland. 1 cc of saline was injected to confirm the presence of a good tissue plane. The liquid materials and powder provided in the [facility] kit were then mixed as per protocol and about 10 cc were simultaneously injected into the same space, giving an immediate minimum 7 mm increased spacing from the prostate to the outside of the rectal wall. At the completion of the procedure, which was tolerated well, the perineum was cleaned, direct pressure was applied to the perineum, and the puncture sites were covered with an antibiotic dressing. He was discharged in good condition. He was given a prescription for Ciprofloxacin 500 mg, BID x 3 days and Naproxen, with instructions to begin tomorrow. He was given an appointment to return for CT based simulation and treatment planning. The IMRT/IGRT phase of treatment will then follow soon thereafter.        Electronically signed by Ruth Ann Foster MD on 3/2/2023 at 10:55 AM

## 2023-03-02 NOTE — ANESTHESIA POSTPROCEDURE EVALUATION
Department of Anesthesiology  Postprocedure Note    Patient: Pino Gomez Jr.   MRN: [id number]  YOB: 1955  Date of evaluation: 3/2/2023      Procedure Summary     Date: 03/02/23 Room / Location: [facility] MAIN 08 / [facility] MAIN OR    Anesthesia Start: 0953 Anesthesia Stop: 1107    Procedure: TRANSRECTAL ULTRASOUND GUIDED FIDUCIAL MARKER PLACEMENT; SPACE OAR (Perineum) Diagnosis:       Prostate cancer ([address]      (Prostate cancer ([address] Thersa Manner)    Surgeons: Roney Carrasco MD Responsible Provider: Ayaka Roper MD    Anesthesia Type: General ASA Status: 3          Anesthesia Type: General    Miky Phase I: Miky Score: 10    Miky Phase II: Miky Score: 10      Anesthesia Post Evaluation    Patient location during evaluation: bedside  Patient participation: complete - patient participated  Airway patency: patent  Complications: no  Cardiovascular status: hemodynamically stable  Respiratory status: acceptable  Hydration status: stable

## 2023-03-02 NOTE — ANESTHESIA PRE PROCEDURE
Department of Anesthesiology  Preprocedure Note       Name:  Debby Meraz. Age:  70 y.o.  :  1955                                          MRN:  [id number]         Date:  3/2/2023      Surgeon: Tiarra Panchal):  Bharat Clancy MD    Procedure: Procedure(s):  TRANSRECTAL ULTRASOUND GUIDED FIDUCIAL MARKER PLACEMENT; SPACE OAR    Medications prior to admission:   Prior to Admission medications    Medication Sig Start Date End Date Taking? Authorizing Provider   cephALEXin (KEFLEX) 500 MG capsule     Historical Provider, MD   DULoxetine (CYMBALTA) 30 MG extended release capsule     Historical Provider, MD   ergocalciferol (ERGOCALCIFEROL) 1.25 MG (92219 UT) capsule Vitamin D2 1,250 mcg (50,000 unit) capsule   Take 1 capsule every week by oral route. 13   Historical Provider, MD   rosuvastatin (CRESTOR) 20 MG tablet Take 20 mg by mouth  Patient not taking: Reported on 2023    Historical Provider, MD   traMADol (ULTRAM) 50 MG tablet     Historical Provider, MD   acetaminophen-codeine (TYLENOL #3) 300-30 MG per tablet Take 1 tablet by mouth every 4 hours as needed.  19   Ar Automatic Reconciliation   albuterol sulfate HFA (PROVENTIL;VENTOLIN;PROAIR) 108 (90 Base) MCG/ACT inhaler Inhale 2 puffs into the lungs every 4 hours as needed 12/10/17   Ar Automatic Reconciliation   aspirin 325 MG tablet Take 325 mg by mouth daily    Ar Automatic Reconciliation   atorvastatin (LIPITOR) 20 MG tablet Take by mouth daily  Patient not taking: Reported on 2023    Ar Automatic Reconciliation   chlorproMAZINE (THORAZINE) 10 MG tablet Take 20 mg by mouth every 6 hours as needed  Patient not taking: Reported on 2023 10/26/18   Ar Automatic Reconciliation   vitamin D3 (CHOLECALCIFEROL) 125 MCG (5000 UT) TABS tablet Take 5,000 Units by mouth daily    Ar Automatic Reconciliation   cyclobenzaprine (FLEXERIL) 5 MG tablet Take 10 mg by mouth 3 times daily (with meals)  Patient not taking: Reported on 2023 10/28/18   Ar Automatic Reconciliation   esomeprazole (NEXIUM) 20 MG delayed release capsule Take 20 mg by mouth daily    Ar Automatic Reconciliation   hydroxychloroquine (PLAQUENIL) 200 MG tablet Take 200 mg by mouth daily  Patient not taking: Reported on 2/28/2023    Ar Automatic Reconciliation   leflunomide (ARAVA) 20 MG tablet Take 20 mg by mouth daily  Patient not taking: Reported on 3/2/2023    Ar Automatic Reconciliation   levoFLOXacin (LEVAQUIN) 750 MG tablet Take one tab po 2 hours prior to biopsy.   Patient not taking: No sig reported 10/26/22   Ar Automatic Reconciliation   predniSONE (DELTASONE) 5 MG tablet Take by mouth daily    Ar Automatic Reconciliation   tamsulosin (FLOMAX) 0.4 MG capsule Take 0.4 mg by mouth 8/29/22   Ar Automatic Reconciliation       Current medications:    Current Facility-Administered Medications   Medication Dose Route Frequency Provider Last Rate Last Admin    lidocaine PF 1 % injection 1 mL  1 mL IntraDERmal Once PRN Summer [place] Him, APRN - CRNA        famotidine (PEPCID) tablet 20 mg  20 mg Oral Once Summer [place] Him, APRN - CRNA        lactated ringers IV soln infusion   IntraVENous Continuous Summer [place] Him, APRN - CRNA        ceFAZolin (ANCEF) 2,000 mg in sterile water 20 mL IV syringe  2,000 mg IntraVENous Once Bia Mata MD           Allergies:  No Known Allergies    Problem List:    Patient Active Problem List   Diagnosis Code    Right inguinal hernia K40.90    Iliac artery aneurysm, left ([facility] I72.3    Aortic ectasia, abdominal ([facility]) I77.811    Bone cancer ([address] C41.9    Atherosclerosis of native artery of both lower extremities with intermittent claudication ([address] I70.213    Immunocompromised state ([address] D84.9    PAD (peripheral artery disease) ([facility]) I73.9    Anemia, unspecified D64.9       Past Medical History:        Diagnosis Date    Arthritis     CAD (coronary artery disease)     Cancer of bone ([address]     COPD (chronic obstructive pulmonary disease) [facility]     GERD (gastroesophageal reflux disease)     Heart failure ([facility]     stents x2 in 2008    History of blood transfusion 2011    History of chemotherapy     History of radiation therapy     History of tobacco use     Hypertension     no meds now    Non Hodgkin's lymphoma ([facility] 2013    had chemo and rad    PAD (peripheral artery disease) [facility])        Past Surgical History:        Procedure Laterality Date    CARDIAC CATHETERIZATION  06/05/2013    COLONOSCOPY N/A 12/09/2019    COLONOSCOPY with polypectomy performed by Mello Parker MD at [address] COLONOSCOPY N/A 08/15/2016    COLONOSCOPY with polypectomy, biopsy and clip performed by Haritha Camacho MD at [place] [address]  06/05/2013    HEENT      Deviated septum,     IR BIOPSY PERC SUPERF BONE  2/23/2018    IR BIOPSY PERC SUPERF BONE 2/23/2018 [facility] IR    OTHER SURGICAL HISTORY      skin cancer excision face    IA UNLISTED PROCEDURE CARDIAC SURGERY      Stented x2- 2008    REPAIR ING HERNIA,5+Y/O,REDUCIBL Right 12/05/2017    Dr. Quarles Peter  11/18/2022    Prostate Bx; Ulices Phillip NP    VASCULAR SURGERY      right leg x 2 October 2018 (Dr Prerna Lord vascular surgeon)       Social History:    Social History     Tobacco Use    Smoking status: Former     Packs/day: 2.00     Types: Cigarettes     Quit date: 8/10/2012     Years since quitting: 10.5    Smokeless tobacco: Never   Substance Use Topics    Alcohol use:  No                                Counseling given: Not Answered      Vital Signs (Current):   Vitals:    02/28/23 1110 03/02/23 0854   BP:  (!) 146/88   Pulse:  85   Resp:  16   Temp:  98.1 °F (36.7 °C)   TempSrc:  Oral   SpO2:  96%   Weight: 215 lb (97.5 kg) 211 lb (95.7 kg)   Height: 6' (1.829 m)                                               BP Readings from Last 3 Encounters:   03/02/23 (!) 146/88       NPO Status: Time of last liquid consumption: 2200                        Time of last solid consumption: 2200                        Date of last liquid consumption: 03/01/23                        Date of last solid food consumption: 03/01/23    BMI:   Wt Readings from Last 3 Encounters:   03/02/23 211 lb (95.7 kg)   02/23/23 215 lb (97.5 kg)   12/22/22 215 lb (97.5 kg)     Body mass index is 28.62 kg/m².    CBC:   Lab Results   Component Value Date/Time    WBC 7.4 01/13/2023 02:47 PM    RBC 5.13 01/13/2023 02:47 PM    HGB 14.0 01/13/2023 02:47 PM    HCT 42.4 01/13/2023 02:47 PM    MCV 83 01/13/2023 02:47 PM    RDW 15.0 01/13/2023 02:47 PM     01/13/2023 02:47 PM       CMP:   Lab Results   Component Value Date/Time     01/13/2023 02:47 PM    K 4.8 01/13/2023 02:47 PM     01/13/2023 02:47 PM    CO2 20 01/13/2023 02:47 PM    BUN 10 01/13/2023 02:47 PM    CREATININE 0.98 01/13/2023 02:47 PM    GFRAA >60 10/23/2020 04:05 PM    AGRATIO 1.4 01/13/2023 02:47 PM    LABGLOM 84 01/13/2023 02:47 PM    GLUCOSE 99 01/13/2023 02:47 PM    PROT 6.4 01/13/2023 02:47 PM    CALCIUM 9.5 01/13/2023 02:47 PM    BILITOT 0.2 01/13/2023 02:47 PM    ALKPHOS 96 01/13/2023 02:47 PM    AST 14 01/13/2023 02:47 PM    ALT 12 01/13/2023 02:47 PM       POC Tests: No results for input(s): POCGLU, POCNA, POCK, POCCL, POCBUN, POCHEMO, POCHCT in the last 72 hours.    Coags: No results found for: PROTIME, INR, APTT    HCG (If Applicable): No results found for: PREGTESTUR, PREGSERUM, HCG, HCGQUANT     ABGs: No results found for: PHART, PO2ART, WAQ4TXH, LXL1GXE, BEART, X0UCQKGS     Type & Screen (If Applicable):  No results found for: LABABO, LABRH    Drug/Infectious Status (If Applicable):  No results found for: HIV, HEPCAB    COVID-19 Screening (If Applicable): No results found for: COVID19        Anesthesia Evaluation  Patient summary reviewed  Airway: Mallampati: II     Neck ROM: limited  Mouth opening: > = 3 FB   Dental:    (+) edentulous      Pulmonary:   (+)  COPD:  decreased breath sounds: bilateral                            Cardiovascular:    (+) hypertension:, CAD:, CHF:,         Rhythm: irregular  Rate: normal                    Neuro/Psych:               GI/Hepatic/Renal:   (+) GERD:,           Endo/Other:    (+) : arthritis: rheumatoid. , .                 Abdominal:             Vascular: Other Findings:           Anesthesia Plan      general     ASA 3       Induction: intravenous. Anesthetic plan and risks discussed with patient.                         Bang Tineo MD   3/2/2023

## (undated) DEVICE — COVER US PRB W15XL120CM W/ GEL RUBBERBAND TAPE STRP FLD GEN

## (undated) DEVICE — WATERPROOF, BACTERIA PROOF DRESSING WITH ABSORBENT SEE THROUGH PAD: Brand: OPSITE POST-OP VISIBLE 15X10CM CTN 20

## (undated) DEVICE — KIT CLN UP BON SECOURS MARYV

## (undated) DEVICE — LIGHT HANDLE: Brand: DEVON

## (undated) DEVICE — SET FLD ADMIN 3 W STPCOCK FIX FEM L BOR 1IN

## (undated) DEVICE — APPLIER CLP L9.375IN APER 2.1MM CLS L3.8MM 20 SM TI CLP

## (undated) DEVICE — BIPOLAR FORCEPS CORD: Brand: VALLEYLAB

## (undated) DEVICE — DRAPE,ANGIO,BRACH,STERILE,38X44: Brand: MEDLINE

## (undated) DEVICE — AIRLIFE™ ADULT CUSHION NASAL CANNULA 14 FOOT (4.3) CRUSH-RESISTANT OXYGEN TUBING, AND U/CONNECT-IT ADAPTER: Brand: AIRLIFE™

## (undated) DEVICE — 3L THIN WALL CAN: Brand: CRD

## (undated) DEVICE — PACK PROCEDURE SURG VASC CATH 161 MMC LF

## (undated) DEVICE — ANGIOGRAPHY KIT CUST VASC

## (undated) DEVICE — INTRODUCER SHTH 4FR CANN L11CM DIL TIP 25MM RED TUNGSTEN

## (undated) DEVICE — SPONGE DISSECT PNUT SM 3/8IN -- 5/PK

## (undated) DEVICE — SUTURE VCRL SZ 4-0 L27IN ABSRB UD L19MM PS-2 3/8 CIR PRIM J426H

## (undated) DEVICE — KIT MIC INTR PERC 4F 60CM SMTH -- VSI

## (undated) DEVICE — ARMADA 35 LL PTA CATHETER 6 MM X 250 MM X 135 CM / OVER-THE-WIRE: Brand: ARMADA

## (undated) DEVICE — MEDI-VAC SUCTION HIGH CAPACITY: Brand: CARDINAL HEALTH

## (undated) DEVICE — FLEX ADVANTAGE 3000CC: Brand: FLEX ADVANTAGE

## (undated) DEVICE — GOWN ISOL IMPERV UNIV, DISP, OPEN BACK, BLUE --

## (undated) DEVICE — POSITIONER RADIOTHERAPY HYDRGEL SPCR SYN ABSRB DEL SYS

## (undated) DEVICE — FLEXOR, CHECK-FLO, INTRODUCER ANSEL MODIFICATION - WITH HIGH-FLEX DILATOR AND HYDROPHILIC COATING: Brand: FLEXOR

## (undated) DEVICE — SYRINGE MED 10ML TRNSLUC BRL PLUNG BLK MRK POLYPR CTRL

## (undated) DEVICE — GAUZE,SPONGE,4"X4",12PLY,STERILE,LF,2'S: Brand: MEDLINE

## (undated) DEVICE — INFLATION DEVICE: Brand: ENCORE 26

## (undated) DEVICE — REM POLYHESIVE ADULT PATIENT RETURN ELECTRODE: Brand: VALLEYLAB

## (undated) DEVICE — SYR 50ML SLIP TIP NSAF LF STRL --

## (undated) DEVICE — SOLUTION IV 1000ML 0.9% SOD CHL

## (undated) DEVICE — PACK PROCEDURE SURG MAJ W/ BASIN LF

## (undated) DEVICE — ANGIO-SEAL VIP VASCULAR CLOSURE DEVICE: Brand: ANGIO-SEAL

## (undated) DEVICE — CATH IV AUTOGRD PNK 20GA 30MM

## (undated) DEVICE — NON-STERILE (2 X 14CM) ENDOCAVITY BALLOON FOR USE WITH ACCUCARE POSITIONING AND STABILIZING EQUIPMENT: Brand: ENDOCAVITY BALLOON

## (undated) DEVICE — GUIDEWIRE WITH ICE™ HYDROPHILIC COATING: Brand: V-18™ CONTROL WIRE™

## (undated) DEVICE — 3M™ STERI-STRIP™ REINFORCED ADHESIVE SKIN CLOSURES, R1546, 1/4 IN X 4 IN (6 MM X 100 MM), 10 STRIPS/ENVELOPE: Brand: 3M™ STERI-STRIP™

## (undated) DEVICE — TRAP SPEC COLL POLYP POLYSTYR --

## (undated) DEVICE — Device

## (undated) DEVICE — CATHETER ANGIO AD PED 4FR L65CM GWIRE 0.035IN PERIPH

## (undated) DEVICE — PENROSE TUBING RADIOPAQUE: Brand: ARGYLE

## (undated) DEVICE — SYRINGE MED 25GA 3ML L5/8IN SUBQ PLAS W/ DETACH NDL SFTY

## (undated) DEVICE — RADIFOCUS GLIDEWIRE ADVANTAGE GUIDEWIRE: Brand: GLIDEWIRE ADVANTAGE

## (undated) DEVICE — TOWEL SURG W16XL26IN BLU NONFENESTRATED DLX ST 2 PER PK

## (undated) DEVICE — SNARE POLYP M W27MMXL240CM OVL STIFF DISP CAPTIVATOR

## (undated) DEVICE — RADIFOCUS TORQUE DEVICE MULTI-TORQUE VISE: Brand: RADIFOCUS TORQUE DEVICE

## (undated) DEVICE — SYR 10ML LUER LOK 1/5ML GRAD --

## (undated) DEVICE — SUTURE PROL SZ 5-0 L36IN NONABSORBABLE BLU L13MM C-1 3/8 8720H

## (undated) DEVICE — KENDALL SCD EXPRESS SLEEVES, KNEE LENGTH, MEDIUM: Brand: KENDALL SCD

## (undated) DEVICE — TABLE COVER: Brand: CONVERTORS

## (undated) DEVICE — INTENDED FOR TISSUE SEPARATION, AND OTHER PROCEDURES THAT REQUIRE A SHARP SURGICAL BLADE TO PUNCTURE OR CUT.: Brand: BARD-PARKER ® CARBON RIB-BACK BLADES

## (undated) DEVICE — TEMPLATE BRACHYTHERAPY 17GA GRID DISP FOR ACCUCARE POS AND

## (undated) DEVICE — CATHETER SUCT TR FL TIP 14FR W/ O CTRL

## (undated) DEVICE — CATHETER ANGIO BER2 038 4 FRX65 CM TEMPO AQUA

## (undated) DEVICE — SOLUTION SCRB 4OZ 4% CHG CLN BASE FOR PT SKIN ANTISEPSIS

## (undated) DEVICE — APPLICATOR BNDG 1MM ADH PREMIERPRO EXOFIN

## (undated) DEVICE — SUTURE VCRL SZ 3-0 L27IN ABSRB UD L26MM SH 1/2 CIR J416H

## (undated) DEVICE — KIT NDL 17GA L20CM MRK L3MM DIA1.2MM SFT TISS G PLCMNT

## (undated) DEVICE — TRAY CATH OD16FR SIL URIN M STATLOK STBL DEV SURSTP

## (undated) DEVICE — SYRINGE MED 20ML STD CLR PLAS LUERLOCK TIP N CTRL DISP

## (undated) DEVICE — Device: Brand: QUICK-CROSS SUPPORT CATHETER

## (undated) DEVICE — DEVICE INFL ENCORE MEDI 26

## (undated) DEVICE — SOLUTION IRRIG 1000ML H2O STRL BLT

## (undated) DEVICE — SYR POWER 150ML 8IN FILL TUBE --

## (undated) DEVICE — FOGARTY - HYDRAGRIP SURGICAL - CLAMP INSERTS: Brand: FOGARTY SOFTJAW

## (undated) DEVICE — SUPPORT WRST AD W3.5XL9IN DIA14.5IN ART SFT ADJ HK AND LOOP

## (undated) DEVICE — AIRLIFE™ NASAL OXYGEN CANNULA CURVED, NONFLARED TIP WITH 14 FOOT (4.3 M) CRUSH-RESISTANT TUBING, OVER-THE-EAR STYLE: Brand: AIRLIFE™

## (undated) DEVICE — TOWEL,OR,DSP,ST,WHITE,DLX,4/PK,20PK/CS: Brand: MEDLINE

## (undated) DEVICE — INFUSOR PRSS BG CUF 500ML -- MEDICHOICE

## (undated) DEVICE — SUTURE PERMA-HAND SZ 2-0 L24IN NONABSORBABLE BLK W/O NDL SA75H

## (undated) DEVICE — BLADE ASSEMB CLP HAIR FINE --

## (undated) DEVICE — CORD BPLR L12FT DISP

## (undated) DEVICE — SUTURE PERMA-HAND SZ 3-0 L24IN NONABSORBABLE BLK W/O NDL SA74H

## (undated) DEVICE — TAPE DRSG 3INX10YD SILK HYPOALRG CURASILK

## (undated) DEVICE — GLOVE SURG BIOGEL 8.0 STRL -- SKINSENSE

## (undated) DEVICE — PTA BALLOON DILATATION CATHETER: Brand: MUSTANG™

## (undated) DEVICE — GAUZE,SPONGE,4"X4",16PLY,STRL,LF,10/TRAY: Brand: MEDLINE

## (undated) DEVICE — VIPERWIRE, ADVANCE PERIPHERAL, .017" DIA SPRING TIP, 335CM, 5 PACK: Brand: VIPERWIRE, ADVANCE

## (undated) DEVICE — PRESSURE MONITORING SET: Brand: TRUWAVE

## (undated) DEVICE — 3M™ STERI-STRIP™ COMPOUND BENZOIN TINCTURE 40 BAGS/CARTON 4 CARTONS/CASE C1544: Brand: 3M™ STERI-STRIP™

## (undated) DEVICE — SYRINGE ANGIO 30 CC PLUNG 6 LCK POS POLYCARB WHT VACLOK

## (undated) DEVICE — SNARE VASC L240CM LOOP W10MM SHTH DIA2.4MM RND STIFF CLD

## (undated) DEVICE — DIAMONDBACK PERIPHERAL, SOLID CROWN, 1.50MM, 145CM SHAFT: Brand: DIAMONDBACK PERIPHERAL

## (undated) DEVICE — 1016 S-DRAPE IRRIG POUCH 10/BOX: Brand: STERI-DRAPE™

## (undated) DEVICE — TELFA NON-ADHERENT ABSORBENT DRESSING: Brand: TELFA

## (undated) DEVICE — 1010 S-DRAPE TOWEL DRAPE 10/BX: Brand: STERI-DRAPE™

## (undated) DEVICE — Device: Brand: ASTATO 30

## (undated) DEVICE — CANISTER SUCT 1500ML PLAS DISS INLET AND HYDROPHOBIC FLTR

## (undated) DEVICE — SHEET, T, LAPAROTOMY, STERILE: Brand: MEDLINE

## (undated) DEVICE — TRAY PREP DRY W/ PREM GLV 2 APPL 6 SPNG 2 UNDPD 1 OVERWRAP

## (undated) DEVICE — FLUFF AND POLYMER UNDERPAD,EXTRA HEAVY: Brand: WINGS

## (undated) DEVICE — SPONGE SURG DISSECTOR 3/8 IN RND PNUT COTTON WHT STRL DISP

## (undated) DEVICE — 3M™ BAIR PAWS FLEX™ WARMING GOWN, STANDARD, 20 PER CASE 81003: Brand: BAIR PAWS™

## (undated) DEVICE — SUTURE NONABSORBABLE MONOFILAMENT 5-0 PS-2 18 IN BLK ETHILON 1666H

## (undated) DEVICE — MULTITASC DISSECTION/TRANSECTION DEVICE, 7MM: Brand: MULTITASC DISSECTION/TRANSECTION DEVICE

## (undated) DEVICE — 48" PROBE COVER W/GEL, ULTRASOUND, STERILE: Brand: SITE-RITE

## (undated) DEVICE — SYR ART 700 CLEAR MARK 7 -- ARTERION

## (undated) DEVICE — PTA BALLOON DILATATION CATHETER: Brand: STERLING™

## (undated) DEVICE — INTENDED FOR TISSUE SEPARATION, AND OTHER PROCEDURES THAT REQUIRE A SHARP SURGICAL BLADE TO PUNCTURE OR CUT.: Brand: BARD-PARKER SAFETY BLADES SIZE 15, STERILE

## (undated) DEVICE — CANNULA ORIG TL CLR W FOAM CUSHIONS AND 14FT SUPL TB 3 CHN

## (undated) DEVICE — STERILE POLYISOPRENE POWDER-FREE SURGICAL GLOVES: Brand: PROTEXIS

## (undated) DEVICE — COPILOT KIT INCLUDES BLEEDBACK CONTROL VALVE 20/30 INDEFLATOR INFLATION DEVICE 30 ATM 20 CC / GUIDE WIRE INTRODUCER / TORQUE DEVICE: Brand: INDEFLATOR

## (undated) DEVICE — PERCLOSE PROGLIDE™ SUTURE-MEDIATED CLOSURE SYSTEM: Brand: PERCLOSE PROGLIDE™

## (undated) DEVICE — SUT PROL 6-0 30IN C1 DA BLU --

## (undated) DEVICE — CANNULA SFT TCH 10IN 50/CS

## (undated) DEVICE — (D)GLOVE SURG TRIFLX 8 PWD LTX -- DISC BY MFR USE ITEM 302994

## (undated) DEVICE — NEEDLE HYPO 25GA L1.5IN BVL ORIENTED ECLIPSE

## (undated) DEVICE — STERILE POLYISOPRENE POWDER-FREE SURGICAL GLOVES WITH EMOLLIENT COATING: Brand: PROTEXIS

## (undated) DEVICE — GUIDEWIRE FIX COR S STL STR 021INX15CM

## (undated) DEVICE — CULTURETTE SGL EVAC TUBE PALL -- 100/CA

## (undated) DEVICE — Device: Brand: TREASURE 12

## (undated) DEVICE — SUTURE ABSORBABLE BRAIDED 2-0 CT-1 27 IN UD VICRYL J259H

## (undated) DEVICE — ENDOSCOPY PUMP TUBING/ CAP SET: Brand: ERBE

## (undated) DEVICE — Z DISCONTINUED NO SUB IDED ELECTRODE ES L2.8IN S STL INSUL NDL DISP EDGE

## (undated) DEVICE — BOWL 32 OZ CAP PLAS

## (undated) DEVICE — GLOVE SURG SZ 7.5 L11.73IN FNGR THK9.8MIL STRW LTX POLYMER

## (undated) DEVICE — INTRODUCER SHTH 7FR CANN L11CM DIL TIP 35MM ORNG TUNGSTEN

## (undated) DEVICE — SUTURE ETHBND EXCEL SZ 0 L18IN NONABSORBABLE GRN L26MM CT-2 CX27D

## (undated) DEVICE — SYR 20ML LL STRL LF --

## (undated) DEVICE — PROCEDURE KIT FLUID MGMT 10 FR CUST MAINFOLD

## (undated) DEVICE — FORCEPS BX L240CM JAW DIA2.8MM L CAP W/ NDL MIC MESH TOOTH

## (undated) DEVICE — SUTURE MCRYL SZ 4-0 L18IN ABSRB UD L19MM PS-2 3/8 CIR PRIM Y496G

## (undated) DEVICE — RADIFOCUS GLIDEWIRE: Brand: GLIDEWIRE

## (undated) DEVICE — MEDI-VAC NON-CONDUCTIVE SUCTION TUBING: Brand: CARDINAL HEALTH

## (undated) DEVICE — DRAPE,UTILITY,XL,4/PK,STERILE: Brand: MEDLINE

## (undated) DEVICE — INTRODUCER SHTH 6FR CANN L11CM DIL TIP 35MM GRN TUNGSTEN

## (undated) DEVICE — SUTURE VCRL SZ 2-0 L12X18IN ABSRB UD POLYGLACTIN 910 BRAID J911T